# Patient Record
Sex: MALE | Race: WHITE | Employment: OTHER
[De-identification: names, ages, dates, MRNs, and addresses within clinical notes are randomized per-mention and may not be internally consistent; named-entity substitution may affect disease eponyms.]

---

## 2017-01-27 ENCOUNTER — OFFICE VISIT (OUTPATIENT)
Dept: FAMILY MEDICINE CLINIC | Facility: CLINIC | Age: 67
End: 2017-01-27

## 2017-01-27 VITALS
BODY MASS INDEX: 42.66 KG/M2 | HEIGHT: 70 IN | SYSTOLIC BLOOD PRESSURE: 144 MMHG | HEART RATE: 66 BPM | WEIGHT: 298 LBS | TEMPERATURE: 96.3 F | DIASTOLIC BLOOD PRESSURE: 88 MMHG | OXYGEN SATURATION: 97 %

## 2017-01-27 DIAGNOSIS — R05.9 COUGH: ICD-10-CM

## 2017-01-27 DIAGNOSIS — J01.01 ACUTE RECURRENT MAXILLARY SINUSITIS: Primary | ICD-10-CM

## 2017-01-27 PROCEDURE — 99213 OFFICE O/P EST LOW 20 MIN: CPT | Performed by: NURSE PRACTITIONER

## 2017-01-27 RX ORDER — AMOXICILLIN 875 MG/1
875 TABLET, COATED ORAL 2 TIMES DAILY
Qty: 20 TABLET | Refills: 0 | Status: SHIPPED | OUTPATIENT
Start: 2017-01-27 | End: 2017-02-06

## 2017-01-27 ASSESSMENT — ENCOUNTER SYMPTOMS
SHORTNESS OF BREATH: 1
SORE THROAT: 0
CHEST TIGHTNESS: 0
ABDOMINAL PAIN: 0
RHINORRHEA: 1
WHEEZING: 0
TROUBLE SWALLOWING: 0
SINUS PRESSURE: 1
COUGH: 1

## 2017-03-07 ENCOUNTER — TELEPHONE (OUTPATIENT)
Dept: FAMILY MEDICINE CLINIC | Facility: CLINIC | Age: 67
End: 2017-03-07

## 2017-03-07 RX ORDER — AMOXICILLIN AND CLAVULANATE POTASSIUM 875; 125 MG/1; MG/1
1 TABLET, FILM COATED ORAL 2 TIMES DAILY
Qty: 20 TABLET | Refills: 0 | Status: SHIPPED | OUTPATIENT
Start: 2017-03-07 | End: 2017-03-17

## 2017-03-15 RX ORDER — GLIMEPIRIDE 2 MG/1
2 TABLET ORAL EVERY MORNING
Qty: 30 TABLET | Refills: 3 | Status: SHIPPED | OUTPATIENT
Start: 2017-03-15 | End: 2017-04-20 | Stop reason: SDUPTHER

## 2017-03-27 RX ORDER — AMLODIPINE BESYLATE AND BENAZEPRIL HYDROCHLORIDE 5; 20 MG/1; MG/1
CAPSULE ORAL
Qty: 90 CAPSULE | Refills: 0 | Status: SHIPPED | OUTPATIENT
Start: 2017-03-27 | End: 2017-06-25 | Stop reason: SDUPTHER

## 2017-04-17 RX ORDER — SIMVASTATIN 10 MG
TABLET ORAL
Qty: 90 TABLET | Refills: 0 | Status: SHIPPED | OUTPATIENT
Start: 2017-04-17 | End: 2017-07-19 | Stop reason: SDUPTHER

## 2017-04-17 RX ORDER — ATENOLOL 50 MG/1
TABLET ORAL
Qty: 90 TABLET | Refills: 0 | Status: SHIPPED | OUTPATIENT
Start: 2017-04-17 | End: 2017-07-19 | Stop reason: SDUPTHER

## 2017-04-20 ENCOUNTER — TELEPHONE (OUTPATIENT)
Dept: FAMILY MEDICINE CLINIC | Age: 67
End: 2017-04-20

## 2017-04-20 RX ORDER — GLIMEPIRIDE 2 MG/1
2 TABLET ORAL EVERY MORNING
Qty: 30 TABLET | Refills: 3 | Status: SHIPPED | OUTPATIENT
Start: 2017-04-20 | End: 2017-04-24 | Stop reason: SDUPTHER

## 2017-04-24 RX ORDER — GLIMEPIRIDE 2 MG/1
2 TABLET ORAL EVERY MORNING
Qty: 90 TABLET | Refills: 3 | Status: SHIPPED | OUTPATIENT
Start: 2017-04-24 | End: 2017-07-06 | Stop reason: SDUPTHER

## 2017-06-26 RX ORDER — AMLODIPINE BESYLATE AND BENAZEPRIL HYDROCHLORIDE 5; 20 MG/1; MG/1
CAPSULE ORAL
Qty: 90 CAPSULE | Refills: 3 | Status: SHIPPED | OUTPATIENT
Start: 2017-06-26 | End: 2018-06-22 | Stop reason: SDUPTHER

## 2017-07-06 RX ORDER — GLIMEPIRIDE 2 MG/1
2 TABLET ORAL EVERY MORNING
Qty: 90 TABLET | Refills: 0 | Status: SHIPPED | OUTPATIENT
Start: 2017-07-06 | End: 2017-10-30 | Stop reason: SDUPTHER

## 2017-07-19 RX ORDER — SIMVASTATIN 10 MG
TABLET ORAL
Qty: 90 TABLET | Refills: 0 | Status: SHIPPED | OUTPATIENT
Start: 2017-07-19 | End: 2017-07-20 | Stop reason: SDUPTHER

## 2017-07-19 RX ORDER — ATENOLOL 50 MG/1
TABLET ORAL
Qty: 90 TABLET | Refills: 0 | Status: SHIPPED | OUTPATIENT
Start: 2017-07-19 | End: 2017-10-22 | Stop reason: SDUPTHER

## 2017-07-20 RX ORDER — SIMVASTATIN 10 MG
TABLET ORAL
Qty: 90 TABLET | Refills: 0 | Status: SHIPPED | OUTPATIENT
Start: 2017-07-20 | End: 2018-10-18 | Stop reason: SDUPTHER

## 2017-09-12 ENCOUNTER — OFFICE VISIT (OUTPATIENT)
Dept: FAMILY MEDICINE CLINIC | Age: 67
End: 2017-09-12
Payer: COMMERCIAL

## 2017-09-12 VITALS
WEIGHT: 292.8 LBS | SYSTOLIC BLOOD PRESSURE: 134 MMHG | TEMPERATURE: 96.7 F | BODY MASS INDEX: 41.92 KG/M2 | DIASTOLIC BLOOD PRESSURE: 82 MMHG | HEART RATE: 60 BPM | HEIGHT: 70 IN | OXYGEN SATURATION: 98 %

## 2017-09-12 DIAGNOSIS — Z00.00 ROUTINE GENERAL MEDICAL EXAMINATION AT A HEALTH CARE FACILITY: ICD-10-CM

## 2017-09-12 DIAGNOSIS — Z12.5 SPECIAL SCREENING FOR MALIGNANT NEOPLASM OF PROSTATE: ICD-10-CM

## 2017-09-12 DIAGNOSIS — E78.2 MIXED HYPERLIPIDEMIA: ICD-10-CM

## 2017-09-12 DIAGNOSIS — E11.9 TYPE 2 DIABETES MELLITUS WITHOUT COMPLICATION, WITHOUT LONG-TERM CURRENT USE OF INSULIN (HCC): Primary | ICD-10-CM

## 2017-09-12 DIAGNOSIS — Z23 NEEDS FLU SHOT: ICD-10-CM

## 2017-09-12 DIAGNOSIS — I10 ESSENTIAL HYPERTENSION: ICD-10-CM

## 2017-09-12 DIAGNOSIS — Z23 NEED FOR 23-POLYVALENT PNEUMOCOCCAL POLYSACCHARIDE VACCINE: ICD-10-CM

## 2017-09-12 LAB
CREATININE URINE POCT: 100
HBA1C MFR BLD: 6.4 %
MICROALBUMIN/CREAT 24H UR: 150 MG/G{CREAT}
MICROALBUMIN/CREAT UR-RTO: NORMAL

## 2017-09-12 PROCEDURE — 82044 UR ALBUMIN SEMIQUANTITATIVE: CPT | Performed by: NURSE PRACTITIONER

## 2017-09-12 PROCEDURE — 83036 HEMOGLOBIN GLYCOSYLATED A1C: CPT | Performed by: NURSE PRACTITIONER

## 2017-09-12 PROCEDURE — 90732 PPSV23 VACC 2 YRS+ SUBQ/IM: CPT | Performed by: NURSE PRACTITIONER

## 2017-09-12 PROCEDURE — 90471 IMMUNIZATION ADMIN: CPT | Performed by: NURSE PRACTITIONER

## 2017-09-12 PROCEDURE — 90662 IIV NO PRSV INCREASED AG IM: CPT | Performed by: NURSE PRACTITIONER

## 2017-09-12 PROCEDURE — 99214 OFFICE O/P EST MOD 30 MIN: CPT | Performed by: NURSE PRACTITIONER

## 2017-09-12 PROCEDURE — 90472 IMMUNIZATION ADMIN EACH ADD: CPT | Performed by: NURSE PRACTITIONER

## 2017-09-12 PROCEDURE — G8417 CALC BMI ABV UP PARAM F/U: HCPCS | Performed by: NURSE PRACTITIONER

## 2017-09-12 ASSESSMENT — ENCOUNTER SYMPTOMS
ABDOMINAL PAIN: 0
DIARRHEA: 0
NAUSEA: 0
SHORTNESS OF BREATH: 0
VOMITING: 0
COUGH: 0
CONSTIPATION: 0
BLOOD IN STOOL: 0
CHEST TIGHTNESS: 0
COLOR CHANGE: 0

## 2017-09-15 ENCOUNTER — HOSPITAL ENCOUNTER (OUTPATIENT)
Age: 67
Setting detail: SPECIMEN
Discharge: HOME | End: 2017-09-15
Payer: COMMERCIAL

## 2017-09-15 DIAGNOSIS — Z12.5 SPECIAL SCREENING FOR MALIGNANT NEOPLASM OF PROSTATE: ICD-10-CM

## 2017-09-15 DIAGNOSIS — E78.2 MIXED HYPERLIPIDEMIA: ICD-10-CM

## 2017-09-15 DIAGNOSIS — E11.9 TYPE 2 DIABETES MELLITUS WITHOUT COMPLICATION, WITHOUT LONG-TERM CURRENT USE OF INSULIN (HCC): ICD-10-CM

## 2017-09-15 DIAGNOSIS — Z00.00 ROUTINE GENERAL MEDICAL EXAMINATION AT A HEALTH CARE FACILITY: ICD-10-CM

## 2017-09-15 LAB
ABSOLUTE EOS #: 0.1 K/UL (ref 0–0.4)
ABSOLUTE LYMPH #: 2.1 K/UL (ref 1–4.8)
ABSOLUTE MONO #: 0.5 K/UL (ref 0.1–1.2)
ALBUMIN SERPL-MCNC: 4.3 G/DL (ref 3.5–5.2)
ALBUMIN/GLOBULIN RATIO: 1.5 (ref 1–2.5)
ALP BLD-CCNC: 61 U/L (ref 40–129)
ALT SERPL-CCNC: 30 U/L (ref 5–41)
ANION GAP SERPL CALCULATED.3IONS-SCNC: 12 MMOL/L (ref 9–17)
AST SERPL-CCNC: 30 U/L
BASOPHILS # BLD: 1 %
BASOPHILS ABSOLUTE: 0 K/UL (ref 0–0.2)
BILIRUB SERPL-MCNC: 1.11 MG/DL (ref 0.3–1.2)
BUN BLDV-MCNC: 13 MG/DL (ref 8–23)
BUN/CREAT BLD: ABNORMAL (ref 9–20)
CALCIUM SERPL-MCNC: 9.1 MG/DL (ref 8.6–10.4)
CHLORIDE BLD-SCNC: 100 MMOL/L (ref 98–107)
CHOLESTEROL/HDL RATIO: 4.5
CHOLESTEROL: 149 MG/DL
CO2: 27 MMOL/L (ref 20–31)
CREAT SERPL-MCNC: 0.78 MG/DL (ref 0.7–1.2)
DIFFERENTIAL TYPE: NORMAL
EOSINOPHILS RELATIVE PERCENT: 1 %
GFR AFRICAN AMERICAN: >60 ML/MIN
GFR NON-AFRICAN AMERICAN: >60 ML/MIN
GFR SERPL CREATININE-BSD FRML MDRD: ABNORMAL ML/MIN/{1.73_M2}
GFR SERPL CREATININE-BSD FRML MDRD: ABNORMAL ML/MIN/{1.73_M2}
GLUCOSE BLD-MCNC: 127 MG/DL (ref 70–99)
HCT VFR BLD CALC: 43.3 % (ref 41–53)
HDLC SERPL-MCNC: 33 MG/DL
HEMOGLOBIN: 14.9 G/DL (ref 13.5–17.5)
LDL CHOLESTEROL: 88 MG/DL (ref 0–130)
LYMPHOCYTES # BLD: 28 %
MCH RBC QN AUTO: 31.5 PG (ref 26–34)
MCHC RBC AUTO-ENTMCNC: 34.3 G/DL (ref 31–37)
MCV RBC AUTO: 91.8 FL (ref 80–100)
MONOCYTES # BLD: 7 %
PDW BLD-RTO: 14.2 % (ref 12.5–15.4)
PLATELET # BLD: 176 K/UL (ref 140–450)
PLATELET ESTIMATE: NORMAL
PMV BLD AUTO: 10.4 FL (ref 6–12)
POTASSIUM SERPL-SCNC: 4.3 MMOL/L (ref 3.7–5.3)
PROSTATE SPECIFIC ANTIGEN: 1.72 UG/L
RBC # BLD: 4.71 M/UL (ref 4.5–5.9)
RBC # BLD: NORMAL 10*6/UL
SEG NEUTROPHILS: 63 %
SEGMENTED NEUTROPHILS ABSOLUTE COUNT: 4.9 K/UL (ref 1.8–7.7)
SODIUM BLD-SCNC: 139 MMOL/L (ref 135–144)
TOTAL PROTEIN: 7.2 G/DL (ref 6.4–8.3)
TRIGL SERPL-MCNC: 142 MG/DL
VLDLC SERPL CALC-MCNC: ABNORMAL MG/DL (ref 1–30)
WBC # BLD: 7.7 K/UL (ref 3.5–11)
WBC # BLD: NORMAL 10*3/UL

## 2017-09-29 RX ORDER — MONTELUKAST SODIUM 4 MG/1
1 TABLET, CHEWABLE ORAL 2 TIMES DAILY
Qty: 180 TABLET | Refills: 3 | Status: SHIPPED | OUTPATIENT
Start: 2017-09-29 | End: 2018-12-27 | Stop reason: SDUPTHER

## 2017-10-30 RX ORDER — GLIMEPIRIDE 2 MG/1
2 TABLET ORAL EVERY MORNING
Qty: 90 TABLET | Refills: 0 | Status: SHIPPED | OUTPATIENT
Start: 2017-10-30 | End: 2018-01-28 | Stop reason: SDUPTHER

## 2018-01-03 ENCOUNTER — OFFICE VISIT (OUTPATIENT)
Dept: FAMILY MEDICINE CLINIC | Age: 68
End: 2018-01-03
Payer: COMMERCIAL

## 2018-01-03 VITALS
BODY MASS INDEX: 42.49 KG/M2 | TEMPERATURE: 96.2 F | HEART RATE: 68 BPM | SYSTOLIC BLOOD PRESSURE: 142 MMHG | HEIGHT: 70 IN | OXYGEN SATURATION: 97 % | DIASTOLIC BLOOD PRESSURE: 76 MMHG | WEIGHT: 296.8 LBS

## 2018-01-03 DIAGNOSIS — J01.81 OTHER ACUTE RECURRENT SINUSITIS: ICD-10-CM

## 2018-01-03 DIAGNOSIS — J20.9 ACUTE BRONCHITIS, UNSPECIFIED ORGANISM: Primary | ICD-10-CM

## 2018-01-03 PROCEDURE — 99213 OFFICE O/P EST LOW 20 MIN: CPT | Performed by: NURSE PRACTITIONER

## 2018-01-03 RX ORDER — ALBUTEROL SULFATE 90 UG/1
2 AEROSOL, METERED RESPIRATORY (INHALATION) EVERY 6 HOURS PRN
Qty: 1 INHALER | Refills: 3 | Status: SHIPPED | OUTPATIENT
Start: 2018-01-03 | End: 2018-10-18 | Stop reason: SDUPTHER

## 2018-01-03 RX ORDER — AZITHROMYCIN 250 MG/1
TABLET, FILM COATED ORAL
Qty: 1 PACKET | Refills: 0 | Status: SHIPPED | OUTPATIENT
Start: 2018-01-03 | End: 2018-01-13

## 2018-01-03 ASSESSMENT — ENCOUNTER SYMPTOMS
WHEEZING: 1
CHEST TIGHTNESS: 1
TROUBLE SWALLOWING: 0
SINUS PRESSURE: 1
SINUS PAIN: 1
ABDOMINAL PAIN: 0
SORE THROAT: 0
COUGH: 1
SHORTNESS OF BREATH: 0
RHINORRHEA: 1

## 2018-01-03 ASSESSMENT — PATIENT HEALTH QUESTIONNAIRE - PHQ9
SUM OF ALL RESPONSES TO PHQ QUESTIONS 1-9: 0
2. FEELING DOWN, DEPRESSED OR HOPELESS: 0
SUM OF ALL RESPONSES TO PHQ9 QUESTIONS 1 & 2: 0
1. LITTLE INTEREST OR PLEASURE IN DOING THINGS: 0

## 2018-01-09 ENCOUNTER — TELEPHONE (OUTPATIENT)
Dept: FAMILY MEDICINE CLINIC | Age: 68
End: 2018-01-09

## 2018-01-09 DIAGNOSIS — R05.9 COUGH: Primary | ICD-10-CM

## 2018-01-09 RX ORDER — GUAIFENESIN AND CODEINE PHOSPHATE 100; 10 MG/5ML; MG/5ML
5 SOLUTION ORAL 2 TIMES DAILY PRN
Qty: 240 ML | Refills: 0 | Status: SHIPPED | OUTPATIENT
Start: 2018-01-09 | End: 2018-01-16

## 2018-01-09 NOTE — TELEPHONE ENCOUNTER
You saw patient last wed and he still has a bad cough  So he was wondering if you would order something else that is a little stronger.   You gave him a z phillip previously

## 2018-01-09 NOTE — TELEPHONE ENCOUNTER
cxr ordered, zpack should have cleared up any bacterial infection, also new cough syrup called in with codeine, would like to hold on more abx until cxr done

## 2018-06-11 ENCOUNTER — OFFICE VISIT (OUTPATIENT)
Dept: FAMILY MEDICINE CLINIC | Age: 68
End: 2018-06-11
Payer: COMMERCIAL

## 2018-06-11 VITALS
HEIGHT: 70 IN | SYSTOLIC BLOOD PRESSURE: 138 MMHG | BODY MASS INDEX: 42.52 KG/M2 | DIASTOLIC BLOOD PRESSURE: 76 MMHG | RESPIRATION RATE: 17 BRPM | WEIGHT: 297 LBS | HEART RATE: 61 BPM | OXYGEN SATURATION: 94 % | TEMPERATURE: 97.4 F

## 2018-06-11 DIAGNOSIS — E11.9 TYPE 2 DIABETES MELLITUS WITHOUT COMPLICATION, WITHOUT LONG-TERM CURRENT USE OF INSULIN (HCC): Primary | ICD-10-CM

## 2018-06-11 DIAGNOSIS — Z12.11 SCREENING FOR COLON CANCER: ICD-10-CM

## 2018-06-11 DIAGNOSIS — M16.11 PRIMARY OSTEOARTHRITIS OF RIGHT HIP: ICD-10-CM

## 2018-06-11 DIAGNOSIS — S82.832S OTHER CLOSED FRACTURE OF PROXIMAL END OF LEFT FIBULA, SEQUELA: ICD-10-CM

## 2018-06-11 DIAGNOSIS — I10 ESSENTIAL HYPERTENSION: ICD-10-CM

## 2018-06-11 PROCEDURE — 99213 OFFICE O/P EST LOW 20 MIN: CPT | Performed by: INTERNAL MEDICINE

## 2018-06-19 ASSESSMENT — ENCOUNTER SYMPTOMS
NAUSEA: 0
DIARRHEA: 0
COUGH: 0
VOMITING: 0
BLOOD IN STOOL: 0
CHOKING: 0
CONSTIPATION: 0
ABDOMINAL PAIN: 0
CHEST TIGHTNESS: 0
SHORTNESS OF BREATH: 0
BLURRED VISION: 0
STRIDOR: 0
WHEEZING: 0
RECTAL PAIN: 0

## 2018-06-22 RX ORDER — AMLODIPINE BESYLATE AND BENAZEPRIL HYDROCHLORIDE 5; 20 MG/1; MG/1
CAPSULE ORAL
Qty: 90 CAPSULE | Refills: 3 | Status: SHIPPED | OUTPATIENT
Start: 2018-06-22 | End: 2018-06-27 | Stop reason: SDUPTHER

## 2018-06-27 RX ORDER — AMLODIPINE BESYLATE AND BENAZEPRIL HYDROCHLORIDE 5; 20 MG/1; MG/1
CAPSULE ORAL
Qty: 90 CAPSULE | Refills: 3 | Status: SHIPPED | OUTPATIENT
Start: 2018-06-27 | End: 2019-06-17

## 2018-07-19 RX ORDER — GLIMEPIRIDE 2 MG/1
TABLET ORAL
Qty: 90 TABLET | Refills: 1 | Status: SHIPPED | OUTPATIENT
Start: 2018-07-19 | End: 2018-09-28 | Stop reason: ALTCHOICE

## 2018-09-28 ENCOUNTER — OFFICE VISIT (OUTPATIENT)
Dept: FAMILY MEDICINE CLINIC | Age: 68
End: 2018-09-28
Payer: COMMERCIAL

## 2018-09-28 VITALS
HEIGHT: 70 IN | OXYGEN SATURATION: 98 % | BODY MASS INDEX: 41.66 KG/M2 | SYSTOLIC BLOOD PRESSURE: 134 MMHG | WEIGHT: 291 LBS | HEART RATE: 59 BPM | DIASTOLIC BLOOD PRESSURE: 77 MMHG | TEMPERATURE: 97.4 F

## 2018-09-28 DIAGNOSIS — Z00.00 ROUTINE GENERAL MEDICAL EXAMINATION AT A HEALTH CARE FACILITY: ICD-10-CM

## 2018-09-28 DIAGNOSIS — E11.9 TYPE 2 DIABETES MELLITUS WITHOUT COMPLICATION, WITHOUT LONG-TERM CURRENT USE OF INSULIN (HCC): Primary | ICD-10-CM

## 2018-09-28 DIAGNOSIS — Z12.5 SCREENING FOR PROSTATE CANCER: ICD-10-CM

## 2018-09-28 DIAGNOSIS — M79.10 MYALGIA: ICD-10-CM

## 2018-09-28 DIAGNOSIS — E78.2 MIXED HYPERLIPIDEMIA: ICD-10-CM

## 2018-09-28 DIAGNOSIS — Z23 FLU VACCINE NEED: ICD-10-CM

## 2018-09-28 LAB
CREATININE URINE POCT: 200
HBA1C MFR BLD: 6.1 %
MICROALBUMIN/CREAT 24H UR: 150 MG/G{CREAT}
MICROALBUMIN/CREAT UR-RTO: NORMAL

## 2018-09-28 PROCEDURE — 90662 IIV NO PRSV INCREASED AG IM: CPT | Performed by: NURSE PRACTITIONER

## 2018-09-28 PROCEDURE — 82044 UR ALBUMIN SEMIQUANTITATIVE: CPT | Performed by: NURSE PRACTITIONER

## 2018-09-28 PROCEDURE — 90471 IMMUNIZATION ADMIN: CPT | Performed by: NURSE PRACTITIONER

## 2018-09-28 PROCEDURE — 83036 HEMOGLOBIN GLYCOSYLATED A1C: CPT | Performed by: NURSE PRACTITIONER

## 2018-09-28 PROCEDURE — 99214 OFFICE O/P EST MOD 30 MIN: CPT | Performed by: NURSE PRACTITIONER

## 2018-09-28 ASSESSMENT — ENCOUNTER SYMPTOMS
NAUSEA: 0
DIARRHEA: 0
COUGH: 0
ABDOMINAL PAIN: 0
CONSTIPATION: 0
CHEST TIGHTNESS: 0
BLOOD IN STOOL: 0
BACK PAIN: 0
SHORTNESS OF BREATH: 0
VOMITING: 0

## 2018-09-28 NOTE — PROGRESS NOTES
[facility]  [address]  [place], [address]  [phone]      Dwayne Aden is a 76 y.o. male who presents today for his  medical conditions/complaints as noted below. Dwayne Aden is c/o of Diabetes and Muscle Pain (states he has on/off aches in muscles on various areas of body,not sure if he should be concerned)  . HPI:   Diabetes   He presents for his follow-up diabetic visit. He has type 2 diabetes mellitus. His disease course has been improving. There are no hypoglycemic associated symptoms. Pertinent negatives for hypoglycemia include no dizziness, headaches or nervousness/anxiousness. There are no diabetic associated symptoms. Pertinent negatives for diabetes include no chest pain, no fatigue, no polydipsia, no polyphagia, no polyuria and no weakness. There are no hypoglycemic complications. Diabetic complications include heart disease. Pertinent negatives for diabetic complications include no impotence, nephropathy or peripheral neuropathy. Risk factors for coronary artery disease include sedentary lifestyle, obesity, male sex, hypertension, dyslipidemia and diabetes mellitus. Current diabetic treatment includes diet and oral agent (dual therapy). He is compliant with treatment all of the time. His weight is stable. He is following a generally healthy and diabetic diet. Meal planning includes avoidance of concentrated sweets. He participates in exercise intermittently. His home blood glucose trend is decreasing steadily. An ACE inhibitor/angiotensin II receptor blocker is being taken. He does not see a podiatrist.Eye exam is current. Muscle Pain   This is a recurrent problem. The current episode started more than 1 month ago. The problem occurs intermittently. The problem has been waxing and waning since onset. Pain location: right ankle, right hip, left knee. The pain is mild. Nothing aggravates the symptoms.  Pertinent negatives include no abdominal pain, chest pain, constipation, diarrhea, dysuria, fatigue, fever, headaches, joint swelling, nausea, rash, sensory change, shortness of breath, vomiting or weakness. (Pain resolves itself after a few days  ) Past treatments include nothing. There is no swelling present. He has been behaving normally. Past Medical History:   Diagnosis Date    H/O cardiac catheterization 2003    Hyperlipidemia     on med    Hypertension     on med    Skin cancer     Type 2 diabetes mellitus ([address]       Past Surgical History:   Procedure Laterality Date    CARDIAC CATHETERIZATION  several    COLONOSCOPY      INGUINAL HERNIA REPAIR Right     PENIS SURGERY      SKIN CANCER EXCISION      TIBIA FRACTURE SURGERY Left     UPPER GASTROINTESTINAL ENDOSCOPY       Family History   Problem Relation Age of Onset    Kidney Cancer Father     COPD Mother     Other Mother         aneurysm     Social History   Substance Use Topics    Smoking status: Former Smoker     Packs/day: 1.00     Years: 20.00     Quit date: 1/23/1980    Smokeless tobacco: Never Used    Alcohol use No      Current Outpatient Prescriptions   Medication Sig Dispense Refill    metFORMIN (GLUCOPHAGE) 1000 MG tablet TAKE 1 TABLET TWICE A DAY WITH MEALS 180 tablet 1    amLODIPine-benazepril (LOTREL) 5-20 MG per capsule TAKE 1 CAPSULE DAILY 90 capsule 3    albuterol sulfate HFA (VENTOLIN HFA) 108 (90 Base) MCG/ACT inhaler Inhale 2 puffs into the lungs every 6 hours as needed for Wheezing 1 Inhaler 3    atenolol (TENORMIN) 50 MG tablet TAKE 1 TABLET DAILY 90 tablet 3    colestipol (COLESTID) 1 g tablet Take 1 tablet by mouth 2 times daily 180 tablet 3    simvastatin (ZOCOR) 10 MG tablet TAKE 1 TABLET NIGHTLY 90 tablet 0    aspirin (GILDARDO ASPIRIN) 325 MG tablet Take 1 tablet by mouth daily. 30 tablet 0    Omega-3 Fatty Acids (FISH OIL) 1200 MG CAPS Take 1,200 mg by mouth daily.  Garlic 4184 MG CAPS Take 1,000 mg by mouth daily.  Indications: stopped on Readings from Last 3 Encounters:   09/28/18 291 lb (132 kg)   06/11/18 297 lb (134.7 kg)   01/03/18 296 lb 12.8 oz (134.6 kg)        . Lab Results   Component Value Date    CHOL 149 09/15/2017    CHOL 168 10/12/2016    CHOL 171 03/21/2016     Lab Results   Component Value Date    TRIG 142 09/15/2017    TRIG 209 10/12/2016    TRIG 226 03/21/2016     Lab Results   Component Value Date    HDL 33 (L) 09/15/2017    HDL 34 (A) 10/12/2016    HDL 33 (A) 03/21/2016     Lab Results   Component Value Date    LDLCHOLESTEROL 88 09/15/2017    LDLCHOLESTEROL 94 04/08/2014    LDLCHOLESTEROL 81 06/04/2013    LDLCALC 92 10/12/2016    LDLCALC 93 03/21/2016    LDLCALC 77 01/14/2015     Lab Results   Component Value Date    VLDL NOT REPORTED 09/15/2017    VLDL 42 10/12/2016    VLDL 45 03/21/2016     Lab Results   Component Value Date    CHOLHDLRATIO 4.5 09/15/2017    CHOLHDLRATIO 4.9 10/12/2016    CHOLHDLRATIO 5.2 03/21/2016       Plan:      Return in about 6 months (around 3/28/2019) for return for diabetes management, return for bp check/HTN.   Orders Placed This Encounter   Procedures    INFLUENZA, HIGH DOSE, 65 YRS +, IM, PF, PREFILL SYR, 0.5ML (FLUZONE HD)    Lipid, Fasting     Standing Status:   Future     Standing Expiration Date:   9/28/2019    CBC     Standing Status:   Future     Standing Expiration Date:   9/28/2019    Comprehensive Metabolic Panel     Standing Status:   Future     Standing Expiration Date:   9/28/2019    PSA Screening     Standing Status:   Future     Standing Expiration Date:   9/28/2019    CK MB     Standing Status:   Future     Standing Expiration Date:   9/28/2019    Myoglobin, Serum     Standing Status:   Future     Standing Expiration Date:   9/28/2019    Sedimentation Rate     Standing Status:   Future     Standing Expiration Date:   9/28/2019    Vitamin B12     Standing Status:   Future     Standing Expiration Date:   9/28/2019    POCT glycosylated hemoglobin (Hb A1C)    POCT microalbumin Check muscle testing and update other labs  Will call with test results  DM:  Controlled  Stop amaryl  Continue metformin and diabetic diet  Regular exercise as able  He declines foot exam today    Muscle: Will await labs  May trial stopping statin and continue colestid only and see if symptoms resolve  Adeq. Water intake daily    Flu vaccine given  Pt declines AAA screen he stopped smoking in 1980's    Lipids:  Controlled>? Recheck labs  LF diet    Patient given educational materials - see patient instructions. Discussed use, benefit, and side effects of prescribed medications. All patient questions answered. Pt voiced understanding. Reviewed health maintenance. Instructed to continue current medications, diet and exercise.     Electronically signed by Rodrigo Salazar CNP on 9/28/2018 at 10:04 AM

## 2018-09-28 NOTE — PROGRESS NOTES
After obtaining consent, and per orders of Rachele Marx, injection of Flu given in Left deltoid by Bc Raymond. Patient instructed to remain in clinic for 20 minutes afterwards, and to report any adverse reaction to me immediately.

## 2018-09-28 NOTE — PROGRESS NOTES
Visit Information    Have you changed or started any medications since your last visit including any over-the-counter medicines, vitamins, or herbal medicines? no   Have you stopped taking any of your medications? Is so, why? -  no  Are you having any side effects from any of your medications? - no    Have you seen any other physician or provider since your last visit?  no   Have you had any other diagnostic tests since your last visit?  no   Have you been seen in the emergency room and/or had an admission in a hospital since we last saw you?  no   Have you had your routine dental cleaning in the past 6 months?  no     Do you have an active MyChart account? If no, what is the barrier?   No: declined    Patient Care Team:  Ling Monroe DO as PCP - General (Family Medicine)    Medical History Review  Past Medical, Family, and Social History reviewed and  contribute to the patient presenting condition    Health Maintenance   Topic Date Due    AAA screen  1950    Shingles Vaccine (1 of 2 - 2 Dose Series) 12/20/2016    Diabetic foot exam  10/20/2017    Flu vaccine (1) 09/01/2018    A1C test (Diabetic or Prediabetic)  09/12/2018    Diabetic microalbuminuria test  09/12/2018    Potassium monitoring  09/15/2018    Creatinine monitoring  09/15/2018    Lipid screen  09/15/2018    DTaP/Tdap/Td vaccine (1 - Tdap) 06/25/2020 (Originally 10/28/2015)    Hepatitis C screen  06/23/2021 (Originally 1950)    Colon Cancer Screen FIT/FOBT  10/31/2018    Diabetic retinal exam  11/07/2018    Pneumococcal low/med risk  Completed

## 2018-10-09 ENCOUNTER — HOSPITAL ENCOUNTER (OUTPATIENT)
Age: 68
Setting detail: SPECIMEN
Discharge: HOME | End: 2018-10-09
Payer: MEDICARE

## 2018-10-09 DIAGNOSIS — E11.9 TYPE 2 DIABETES MELLITUS WITHOUT COMPLICATION, WITHOUT LONG-TERM CURRENT USE OF INSULIN (HCC): ICD-10-CM

## 2018-10-09 DIAGNOSIS — Z00.00 ROUTINE GENERAL MEDICAL EXAMINATION AT A HEALTH CARE FACILITY: ICD-10-CM

## 2018-10-09 DIAGNOSIS — Z12.5 SCREENING FOR PROSTATE CANCER: ICD-10-CM

## 2018-10-09 DIAGNOSIS — M79.10 MYALGIA: ICD-10-CM

## 2018-10-09 DIAGNOSIS — E78.2 MIXED HYPERLIPIDEMIA: ICD-10-CM

## 2018-10-09 LAB
ALBUMIN SERPL-MCNC: 4.3 G/DL (ref 3.5–5.2)
ALBUMIN/GLOBULIN RATIO: 1.5 (ref 1–2.5)
ALP BLD-CCNC: 62 U/L (ref 40–129)
ALT SERPL-CCNC: 31 U/L (ref 5–41)
ANION GAP SERPL CALCULATED.3IONS-SCNC: 14 MMOL/L (ref 9–17)
AST SERPL-CCNC: 30 U/L
BILIRUB SERPL-MCNC: 1.3 MG/DL (ref 0.3–1.2)
BUN BLDV-MCNC: 15 MG/DL (ref 8–23)
BUN/CREAT BLD: ABNORMAL (ref 9–20)
CALCIUM SERPL-MCNC: 9 MG/DL (ref 8.6–10.4)
CHLORIDE BLD-SCNC: 99 MMOL/L (ref 98–107)
CHOLESTEROL, FASTING: 164 MG/DL
CHOLESTEROL/HDL RATIO: 5.1
CK MB: 6.5 NG/ML
CO2: 25 MMOL/L (ref 20–31)
CREAT SERPL-MCNC: 0.73 MG/DL (ref 0.7–1.2)
GFR AFRICAN AMERICAN: >60 ML/MIN
GFR NON-AFRICAN AMERICAN: >60 ML/MIN
GFR SERPL CREATININE-BSD FRML MDRD: ABNORMAL ML/MIN/{1.73_M2}
GFR SERPL CREATININE-BSD FRML MDRD: ABNORMAL ML/MIN/{1.73_M2}
GLUCOSE BLD-MCNC: 150 MG/DL (ref 70–99)
HCT VFR BLD CALC: 47 % (ref 40.7–50.3)
HDLC SERPL-MCNC: 32 MG/DL
HEMOGLOBIN: 15.5 G/DL (ref 13–17)
LDL CHOLESTEROL: 95 MG/DL (ref 0–130)
MCH RBC QN AUTO: 31.4 PG (ref 25.2–33.5)
MCHC RBC AUTO-ENTMCNC: 33 G/DL (ref 28.4–34.8)
MCV RBC AUTO: 95.1 FL (ref 82.6–102.9)
MYOGLOBIN: 61 NG/ML (ref 28–72)
NRBC AUTOMATED: 0 PER 100 WBC
PDW BLD-RTO: 13.4 % (ref 11.8–14.4)
PLATELET # BLD: 186 K/UL (ref 138–453)
PMV BLD AUTO: 12.3 FL (ref 8.1–13.5)
POTASSIUM SERPL-SCNC: 4 MMOL/L (ref 3.7–5.3)
PROSTATE SPECIFIC ANTIGEN: 1.45 UG/L
RBC # BLD: 4.94 M/UL (ref 4.21–5.77)
SEDIMENTATION RATE, ERYTHROCYTE: 1 MM (ref 0–10)
SODIUM BLD-SCNC: 138 MMOL/L (ref 135–144)
TOTAL PROTEIN: 7.1 G/DL (ref 6.4–8.3)
TRIGLYCERIDE, FASTING: 186 MG/DL
VITAMIN B-12: 743 PG/ML (ref 232–1245)
VLDLC SERPL CALC-MCNC: ABNORMAL MG/DL (ref 1–30)
WBC # BLD: 8.2 K/UL (ref 3.5–11.3)

## 2018-10-18 DIAGNOSIS — J20.9 ACUTE BRONCHITIS, UNSPECIFIED ORGANISM: ICD-10-CM

## 2018-10-18 RX ORDER — SIMVASTATIN 10 MG
TABLET ORAL
Qty: 90 TABLET | Refills: 0 | Status: SHIPPED | OUTPATIENT
Start: 2018-10-18 | End: 2018-12-13 | Stop reason: DRUGHIGH

## 2018-10-18 RX ORDER — ALBUTEROL SULFATE 90 UG/1
2 AEROSOL, METERED RESPIRATORY (INHALATION) EVERY 6 HOURS PRN
Qty: 1 INHALER | Refills: 3 | Status: SHIPPED | OUTPATIENT
Start: 2018-10-18 | End: 2020-09-17

## 2018-10-18 RX ORDER — ATENOLOL 50 MG/1
TABLET ORAL
Qty: 90 TABLET | Refills: 3 | Status: SHIPPED | OUTPATIENT
Start: 2018-10-18 | End: 2019-03-08

## 2018-11-16 LAB
AVERAGE GLUCOSE: NORMAL
HBA1C MFR BLD: 6.3 %

## 2018-12-07 ENCOUNTER — HOSPITAL ENCOUNTER (OUTPATIENT)
Age: 68
Setting detail: SPECIMEN
Discharge: HOME | End: 2018-12-07
Payer: MEDICARE

## 2018-12-07 ENCOUNTER — OFFICE VISIT (OUTPATIENT)
Dept: FAMILY MEDICINE CLINIC | Age: 68
End: 2018-12-07
Payer: MEDICARE

## 2018-12-07 VITALS
HEIGHT: 70 IN | WEIGHT: 292 LBS | BODY MASS INDEX: 41.8 KG/M2 | OXYGEN SATURATION: 96 % | HEART RATE: 61 BPM | SYSTOLIC BLOOD PRESSURE: 139 MMHG | DIASTOLIC BLOOD PRESSURE: 88 MMHG | TEMPERATURE: 97.3 F

## 2018-12-07 DIAGNOSIS — M25.561 ACUTE PAIN OF RIGHT KNEE: ICD-10-CM

## 2018-12-07 DIAGNOSIS — R35.0 URINARY FREQUENCY: ICD-10-CM

## 2018-12-07 DIAGNOSIS — I10 ESSENTIAL HYPERTENSION: ICD-10-CM

## 2018-12-07 DIAGNOSIS — M25.561 ACUTE PAIN OF RIGHT KNEE: Primary | ICD-10-CM

## 2018-12-07 LAB
-: NORMAL
ABSOLUTE EOS #: 0.12 K/UL (ref 0–0.44)
ABSOLUTE IMMATURE GRANULOCYTE: 0.03 K/UL (ref 0–0.3)
ABSOLUTE LYMPH #: 1.79 K/UL (ref 1.1–3.7)
ABSOLUTE MONO #: 0.57 K/UL (ref 0.1–1.2)
AMORPHOUS: NORMAL
BACTERIA: NORMAL
BASOPHILS # BLD: 1 % (ref 0–2)
BASOPHILS ABSOLUTE: 0.08 K/UL (ref 0–0.2)
BILIRUBIN URINE: ABNORMAL
C-REACTIVE PROTEIN: 1.7 MG/L (ref 0–5)
CASTS UA: NORMAL /LPF (ref 0–8)
COLOR: ABNORMAL
COMMENT UA: ABNORMAL
CREAT SERPL-MCNC: 0.71 MG/DL (ref 0.7–1.2)
CRYSTALS, UA: NORMAL /HPF
DIFFERENTIAL TYPE: ABNORMAL
EOSINOPHILS RELATIVE PERCENT: 1 % (ref 1–4)
EPITHELIAL CELLS UA: NORMAL /HPF (ref 0–5)
GFR AFRICAN AMERICAN: >60 ML/MIN
GFR NON-AFRICAN AMERICAN: >60 ML/MIN
GFR SERPL CREATININE-BSD FRML MDRD: NORMAL ML/MIN/{1.73_M2}
GFR SERPL CREATININE-BSD FRML MDRD: NORMAL ML/MIN/{1.73_M2}
GLUCOSE URINE: NEGATIVE
HCT VFR BLD CALC: 45.6 % (ref 40.7–50.3)
HEMOGLOBIN: 16.1 G/DL (ref 13–17)
IMMATURE GRANULOCYTES: 0 %
KETONES, URINE: ABNORMAL
LEUKOCYTE ESTERASE, URINE: NEGATIVE
LYMPHOCYTES # BLD: 21 % (ref 24–43)
MCH RBC QN AUTO: 32.7 PG (ref 25.2–33.5)
MCHC RBC AUTO-ENTMCNC: 35.3 G/DL (ref 28.4–34.8)
MCV RBC AUTO: 92.5 FL (ref 82.6–102.9)
MONOCYTES # BLD: 7 % (ref 3–12)
MUCUS: NORMAL
NITRITE, URINE: NEGATIVE
NRBC AUTOMATED: 0 PER 100 WBC
OTHER OBSERVATIONS UA: NORMAL
PDW BLD-RTO: 12.7 % (ref 11.8–14.4)
PH UA: 6.5 (ref 5–8)
PLATELET # BLD: 213 K/UL (ref 138–453)
PLATELET ESTIMATE: ABNORMAL
PMV BLD AUTO: 12.2 FL (ref 8.1–13.5)
PROTEIN UA: ABNORMAL
RBC # BLD: 4.93 M/UL (ref 4.21–5.77)
RBC # BLD: ABNORMAL 10*6/UL
RBC UA: NORMAL /HPF (ref 0–4)
RENAL EPITHELIAL, UA: NORMAL /HPF
SEDIMENTATION RATE, ERYTHROCYTE: 4 MM (ref 0–10)
SEG NEUTROPHILS: 70 % (ref 36–65)
SEGMENTED NEUTROPHILS ABSOLUTE COUNT: 6.08 K/UL (ref 1.5–8.1)
SPECIFIC GRAVITY UA: 1.02 (ref 1–1.03)
TRICHOMONAS: NORMAL
TURBIDITY: CLEAR
URIC ACID: 6.3 MG/DL (ref 3.4–7)
URINE HGB: NEGATIVE
UROBILINOGEN, URINE: NORMAL
VITAMIN D 25-HYDROXY: 33.6 NG/ML (ref 30–100)
WBC # BLD: 8.7 K/UL (ref 3.5–11.3)
WBC # BLD: ABNORMAL 10*3/UL
WBC UA: NORMAL /HPF (ref 0–5)
YEAST: NORMAL

## 2018-12-07 PROCEDURE — 99213 OFFICE O/P EST LOW 20 MIN: CPT | Performed by: NURSE PRACTITIONER

## 2018-12-07 RX ORDER — TRAMADOL HYDROCHLORIDE 50 MG/1
50 TABLET ORAL EVERY 6 HOURS PRN
Qty: 30 TABLET | Refills: 0 | Status: SHIPPED | OUTPATIENT
Start: 2018-12-07 | End: 2018-12-14

## 2018-12-07 ASSESSMENT — ENCOUNTER SYMPTOMS: SHORTNESS OF BREATH: 0

## 2018-12-10 ENCOUNTER — OFFICE VISIT (OUTPATIENT)
Dept: FAMILY MEDICINE CLINIC | Age: 68
End: 2018-12-10
Payer: MEDICARE

## 2018-12-10 VITALS
HEART RATE: 56 BPM | SYSTOLIC BLOOD PRESSURE: 140 MMHG | OXYGEN SATURATION: 97 % | BODY MASS INDEX: 41.8 KG/M2 | DIASTOLIC BLOOD PRESSURE: 80 MMHG | HEIGHT: 70 IN | TEMPERATURE: 96 F | WEIGHT: 292 LBS

## 2018-12-10 DIAGNOSIS — M54.50 LUMBAR PAIN WITH RADIATION DOWN LEFT LEG: Primary | ICD-10-CM

## 2018-12-10 DIAGNOSIS — M79.605 LUMBAR PAIN WITH RADIATION DOWN LEFT LEG: Primary | ICD-10-CM

## 2018-12-10 DIAGNOSIS — M79.605 LEFT LEG PAIN: ICD-10-CM

## 2018-12-10 PROCEDURE — 99213 OFFICE O/P EST LOW 20 MIN: CPT | Performed by: NURSE PRACTITIONER

## 2018-12-10 ASSESSMENT — ENCOUNTER SYMPTOMS
SHORTNESS OF BREATH: 0
BACK PAIN: 1
COLOR CHANGE: 0

## 2018-12-11 ENCOUNTER — NURSE ONLY (OUTPATIENT)
Dept: FAMILY MEDICINE CLINIC | Age: 68
End: 2018-12-11

## 2018-12-11 DIAGNOSIS — M54.50 LUMBAR PAIN WITH RADIATION DOWN LEFT LEG: Primary | ICD-10-CM

## 2018-12-11 DIAGNOSIS — M79.605 LUMBAR PAIN WITH RADIATION DOWN LEFT LEG: Primary | ICD-10-CM

## 2018-12-11 PROCEDURE — 99999 PR OFFICE/OUTPT VISIT,PROCEDURE ONLY: CPT | Performed by: NURSE PRACTITIONER

## 2018-12-13 ENCOUNTER — OFFICE VISIT (OUTPATIENT)
Dept: FAMILY MEDICINE CLINIC | Age: 68
End: 2018-12-13
Payer: MEDICARE

## 2018-12-13 VITALS
TEMPERATURE: 97.4 F | WEIGHT: 292 LBS | HEIGHT: 70 IN | HEART RATE: 63 BPM | BODY MASS INDEX: 41.8 KG/M2 | OXYGEN SATURATION: 97 % | DIASTOLIC BLOOD PRESSURE: 80 MMHG | SYSTOLIC BLOOD PRESSURE: 133 MMHG

## 2018-12-13 DIAGNOSIS — Z13.6 SCREENING FOR AAA (ABDOMINAL AORTIC ANEURYSM): ICD-10-CM

## 2018-12-13 DIAGNOSIS — E78.2 MIXED HYPERLIPIDEMIA: ICD-10-CM

## 2018-12-13 DIAGNOSIS — I70.8 ATHEROSCLEROSIS OF AORTIC BIFURCATION AND COMMON ILIAC ARTERIES (HCC): Primary | ICD-10-CM

## 2018-12-13 DIAGNOSIS — I70.0 ATHEROSCLEROSIS OF AORTIC BIFURCATION AND COMMON ILIAC ARTERIES (HCC): Primary | ICD-10-CM

## 2018-12-13 PROCEDURE — 99214 OFFICE O/P EST MOD 30 MIN: CPT | Performed by: NURSE PRACTITIONER

## 2018-12-13 RX ORDER — SIMVASTATIN 20 MG
20 TABLET ORAL EVERY EVENING
Qty: 90 TABLET | Refills: 1 | Status: SHIPPED | OUTPATIENT
Start: 2018-12-13 | End: 2019-05-27 | Stop reason: SDUPTHER

## 2018-12-13 RX ORDER — ASPIRIN 81 MG/1
81 TABLET ORAL DAILY
COMMUNITY

## 2018-12-13 ASSESSMENT — ENCOUNTER SYMPTOMS
BACK PAIN: 0
SHORTNESS OF BREATH: 0

## 2018-12-13 NOTE — PROGRESS NOTES
[facility]  [address]  [place], [address]  [phone]      Sunni De La Fuente is a 76 y.o. male who presents today for his  medicalconditions/complaints as noted below. Sunni De La Fuente is c/o of Back Pain (discuss xray results)  . HPI:   Pt here for review of recent lumbar spine xray. He has seen an improvement in pain in left thigh since last visit. He has not had to take any pain medications. He has been off zocor for past few days but unsure if related. He does not have any current back pain but this only happens once and awhile. Past Medical History:   Diagnosis Date    Atherosclerosis of aortic bifurcation and common iliac arteries ([facility] [address] 12/13/2018    H/O cardiac catheterization 2003    Hyperlipidemia     on med    Hypertension     on med    Skin cancer     Type 2 diabetes mellitus ([facility]       Past Surgical History:   Procedure Laterality Date    CARDIAC CATHETERIZATION  several    COLONOSCOPY      INGUINAL HERNIA REPAIR Right     PENIS SURGERY      SKIN CANCER EXCISION      TIBIA FRACTURE SURGERY Left     UPPER GASTROINTESTINAL ENDOSCOPY       Family History   Problem Relation Age of Onset    Kidney Cancer Father     COPD Mother     Other Mother         aneurysm     Social History   Substance Use Topics    Smoking status: Former Smoker     Packs/day: 1.00     Years: 20.00     Quit date: 1/23/1980    Smokeless tobacco: Never Used    Alcohol use No      Current Outpatient Prescriptions   Medication Sig Dispense Refill    aspirin 81 MG EC tablet Take 81 mg by mouth daily      simvastatin (ZOCOR) 20 MG tablet Take 1 tablet by mouth every evening 90 tablet 1    traMADol (ULTRAM) 50 MG tablet Take 1 tablet by mouth every 6 hours as needed for Pain for up to 7 days. . 30 tablet 0    atenolol (TENORMIN) 50 MG tablet TAKE 1 TABLET DAILY 90 tablet 3    albuterol sulfate HFA (VENTOLIN HFA) 108 (90 Base) MCG/ACT inhaler Inhale 2 puffs into the lungs every 6 hours as needed for Wheezing 1 Inhaler 3    metFORMIN (GLUCOPHAGE) 1000 MG tablet TAKE 1 TABLET TWICE A DAY WITH MEALS 180 tablet 1    amLODIPine-benazepril (LOTREL) 5-20 MG per capsule TAKE 1 CAPSULE DAILY 90 capsule 3    colestipol (COLESTID) 1 g tablet Take 1 tablet by mouth 2 times daily 180 tablet 3    aspirin (GILDARDO ASPIRIN) 325 MG tablet Take 1 tablet by mouth daily. 30 tablet 0    Omega-3 Fatty Acids (FISH OIL) 1200 MG CAPS Take 1,200 mg by mouth daily.  Garlic 2986 MG CAPS Take 1,000 mg by mouth daily. Indications: stopped on 1/21/14      glucose blood VI test strips (GILDARDO CONTOUR NEXT TEST) strip 1 each by In Vitro route 2 times daily DX: DM E11.9 100 each 3    GILDARDO MICROLET LANCETS MISC 1 applicator by Does not apply route 2 times daily DX: DM E11.9 60 each 5     No current facility-administered medications for this visit. No Known Allergies    Health Maintenance   Topic Date Due    Diabetic retinal exam  11/07/2018    DTaP/Tdap/Td vaccine (1 - Tdap) 06/25/2020 (Originally 10/28/2015)    AAA screen  09/09/2020 (Originally 1950)    Diabetic foot exam  09/23/2020 (Originally 10/20/2017)    Shingles Vaccine (1 of 2 - 2 Dose Series) 09/24/2020 (Originally 12/20/2016)    Hepatitis C screen  06/23/2021 (Originally 1950)    Diabetic microalbuminuria test  09/28/2019    Lipid screen  10/09/2019    Potassium monitoring  10/09/2019    A1C test (Diabetic or Prediabetic)  11/16/2019    Colon Cancer Screen FIT/FOBT  11/28/2019    Creatinine monitoring  12/07/2019    Flu vaccine  Completed    Pneumococcal low/med risk  Completed       Subjective:      Review of Systems   Constitutional: Negative for activity change, appetite change, fatigue and fever. Respiratory: Negative for shortness of breath. Cardiovascular: Negative for chest pain and leg swelling. Genitourinary: Negative for difficulty urinating. Musculoskeletal: Positive for arthralgias and myalgias.

## 2018-12-13 NOTE — PATIENT INSTRUCTIONS
example, call if:  · You passed out (lost consciousness). · You have severe pain in your belly, back, or chest.  · You have severe trouble breathing. · You have severe leg pain; numbness; or pale, blue-black skin. · You cough up blood. · You have symptoms of a heart attack. These may include:  ? Chest pain or pressure, or a strange feeling in the chest.  ? Sweating. ? Shortness of breath. ? Nausea or vomiting. ? Pain, pressure, or a strange feeling in the back, neck, jaw, or upper belly or in one or both shoulders or arms. ? Lightheadedness or sudden weakness. ? A fast or irregular heartbeat. · You have symptoms of a stroke. These may include:  ? Sudden numbness, tingling, weakness, or loss of movement in your face, arm, or leg, especially on only one side of your body. ? Sudden vision changes. ? Sudden trouble speaking. ? Sudden confusion or trouble understanding simple statements. ? Sudden problems with walking or balance. ? A sudden, severe headache that is different from past headaches. Watch closely for changes in your health, and be sure to contact your doctor if you have any problems. Follow-up care is a key part of your treatment and safety. Be sure to make and go to all appointments, and call your doctor if you are having problems. It's also a good idea to know your test results and keep a list of the medicines you take. Where can you learn more? Go to https://ComfypejoeBinary Fountain.Wigix. org and sign in to your ARCsys account. Enter E090 in the [facility] box to learn more about \"Learning About Atherosclerosis of the Aorta. \"     If you do not have an account, please click on the \"Sign Up Now\" link. Current as of: December 6, 2017  Content Version: 11.8  © 0189-2771 Healthwise, Incorporated. Care instructions adapted under license by [facility] ([facility]).  If you have questions about a medical condition or this instruction, always ask your healthcare professional. Shi Roberts, about stop-smoking programs and medicines. These can increase your chances of quitting for good. How is high cholesterol treated? The goal of treatment is to reduce your chances of having a heart attack or stroke. The goal is not to lower your cholesterol numbers only. · You may make lifestyle changes, such as eating healthy foods, not smoking, losing weight, and being more active. · You may have to take medicine. Follow-up care is a key part of your treatment and safety. Be sure to make and go to all appointments, and call your doctor if you are having problems. It's also a good idea to know your test results and keep a list of the medicines you take. Where can you learn more? Go to https://Shoutly.[employer]. org and sign in to your Foradian account. Enter B981 in the [employer] box to learn more about \"Learning About High Cholesterol. \"     If you do not have an account, please click on the \"Sign Up Now\" link. Current as of: December 6, 2017  Content Version: 11.8  © 8659-1608 [employer]. Care instructions adapted under license by [facility] ([facility]). If you have questions about a medical condition or this instruction, always ask your healthcare professional. [place] [address] any warranty or liability for your use of this information. Patient Education        Therapeutic Ball: Back Exercises  Your Care Instructions  Here are some examples of typical exercises for your condition. Start each exercise slowly. Ease off the exercise if you start to have pain. Your doctor or physical therapist will tell you when you can start these exercises and which ones will work best for you. To prepare, make sure that your ball is the right size for you. When inflated and firm, it should allow you to sit with your hips and knees bent at about a 90-degree angle (like the letter L). How to do the exercises  Seated position on ball    1.  Use this exercise to get used to floor until your shoulders, hips, and knees are all in a straight line. 6. While holding the bridge position, roll the ball toward you with your heels. Keep your hips as level as you can. 7. Pause briefly, and then roll the ball back out. Try to keep the ball rolling straight. You will feel the muscles in your lower belly working as you straighten your legs. 8. Lower your hips, and return to your starting position. 9. Repeat 8 to 12 times. 10. When you can keep your body and the ball steady throughout this exercise, you're ready for more challenge. Try keeping your hips raised while rolling the ball out, holding the bridge, and rolling back, a few times in a row. Praying ayleen    1. Kneel upright with the ball in front of you. 2. To start, clasp your hands together. Rest them on the ball in front of you. 3. As you do this exercise, keep your back and hips straight and tighten your belly and buttocks muscles. Keep your knees in place. 4. Press on the ball with your arms. Lean forward from the knees. This rolls the ball forward. You will bear most of your weight on your arms. 5. If your back starts to ache, you've gone too far. Pull back a bit. 6. Roll back to the start position. 7. Repeat 8 to 12 times. Walk-out plank on ball    1. Kneel over the ball. Place your hands on the floor in front of you. 2. Walk your hands forward until your legs are straight on the ball. This is the plank position. 3. When in plank position, hold your body straight and tighten your belly and buttocks muscles. Keep your chin slightly tucked. 4. Roll as far forward as you can without losing your balance or letting your hips drop. You may stop with the ball under your thighs, or even under your knees or shins. 5. Hold a few seconds, then walk your hands back and return to the start position. 6. Repeat 8 to 12 times. Push-up with thighs on ball    1. Kneel over the ball.  Place your hands on the floor in front of forearms. 15. Press your elbows down into the floor to raise your upper back. As you do this, relax your stomach muscles and allow your back to arch without using your back muscles. As your press up, do not let your hips or pelvis come off the floor. 16. Hold for 15 to 30 seconds, then relax. 17. Repeat 2 to 4 times. Alternate arm and leg (bird dog) exercise    11. Start on the floor, on your hands and knees. 12. Tighten your belly muscles. 13. Raise one leg off the floor, and hold it straight out behind you. Be careful not to let your hip drop down, because that will twist your trunk. 14. Hold for about 6 seconds, then lower your leg and switch to the other leg. 15. Repeat 8 to 12 times on each leg. 16. Over time, work up to holding for 10 to 30 seconds each time. 17. If you feel stable and secure with your leg raised, try raising the opposite arm straight out in front of you at the same time. Knee-to-chest exercise    6. Lie on your back with your knees bent and your feet flat on the floor. 7. Bring one knee to your chest, keeping the other foot flat on the floor (or keeping the other leg straight, whichever feels better on your lower back). 8. Keep your lower back pressed to the floor. Hold for at least 15 to 30 seconds. 9. Relax, and lower the knee to the starting position. 10. Repeat with the other leg. Repeat 2 to 4 times with each leg. 11. To get more stretch, put your other leg flat on the floor while pulling your knee to your chest.    Curl-ups    5. Lie on the floor on your back with your knees bent at a 90-degree angle. Your feet should be flat on the floor, about 12 inches from your buttocks. 6. Cross your arms over your chest. If this bothers your neck, try putting your hands behind your neck (not your head), with your elbows spread apart. 7. Slowly tighten your belly muscles and raise your shoulder blades off the floor.   8. Keep your head in line with your body, and do not press your chin to your chest.  9. Hold this position for 1 or 2 seconds, then slowly lower yourself back down to the floor. 10. Repeat 8 to 12 times. Pelvic tilt exercise    6. Lie on your back with your knees bent. 7. \"Brace\" your stomach. This means to tighten your muscles by pulling in and imagining your belly button moving toward your spine. You should feel like your back is pressing to the floor and your hips and pelvis are rocking back. 8. Hold for about 6 seconds while you breathe smoothly. 9. Repeat 8 to 12 times. Heel dig bridging    5. Lie on your back with both knees bent and your ankles bent so that only your heels are digging into the floor. Your knees should be bent about 90 degrees. 6. Then push your heels into the floor, squeeze your buttocks, and lift your hips off the floor until your shoulders, hips, and knees are all in a straight line. 7. Hold for about 6 seconds as you continue to breathe normally, and then slowly lower your hips back down to the floor and rest for up to 10 seconds. 8. Do 8 to 12 repetitions. Hamstring stretch in doorway    9. Lie on your back in a doorway, with one leg through the open door. 10. Slide your leg up the wall to straighten your knee. You should feel a gentle stretch down the back of your leg. 11. Hold the stretch for at least 15 to 30 seconds. Do not arch your back, point your toes, or bend either knee. Keep one heel touching the floor and the other heel touching the wall. 12. Repeat with your other leg. 13. Do 2 to 4 times for each leg. Hip flexor stretch    11. Kneel on the floor with one knee bent and one leg behind you. Place your forward knee over your foot. Keep your other knee touching the floor. 12. Slowly push your hips forward until you feel a stretch in the upper thigh of your rear leg. 13. Hold the stretch for at least 15 to 30 seconds. Repeat with your other leg. 14. Do 2 to 4 times on each side. Wall sit    8.  Stand with your

## 2018-12-27 RX ORDER — MONTELUKAST SODIUM 4 MG/1
1 TABLET, CHEWABLE ORAL 2 TIMES DAILY
Qty: 180 TABLET | Refills: 3 | Status: SHIPPED | OUTPATIENT
Start: 2018-12-27 | End: 2019-03-25 | Stop reason: ALTCHOICE

## 2019-01-10 ENCOUNTER — HOSPITAL ENCOUNTER (OUTPATIENT)
Dept: VASCULAR LAB | Age: 69
Discharge: HOME | End: 2019-01-10
Payer: MEDICARE

## 2019-01-10 DIAGNOSIS — Z13.6 SCREENING FOR AAA (ABDOMINAL AORTIC ANEURYSM): Primary | ICD-10-CM

## 2019-01-10 PROCEDURE — 76706 US ABDL AORTA SCREEN AAA: CPT

## 2019-02-07 ENCOUNTER — OFFICE VISIT (OUTPATIENT)
Dept: FAMILY MEDICINE CLINIC | Age: 69
End: 2019-02-07
Payer: MEDICARE

## 2019-02-07 VITALS
OXYGEN SATURATION: 96 % | HEART RATE: 66 BPM | DIASTOLIC BLOOD PRESSURE: 75 MMHG | WEIGHT: 290 LBS | HEIGHT: 70 IN | TEMPERATURE: 96.3 F | SYSTOLIC BLOOD PRESSURE: 135 MMHG | BODY MASS INDEX: 41.52 KG/M2

## 2019-02-07 DIAGNOSIS — M79.604 RIGHT LEG PAIN: Primary | ICD-10-CM

## 2019-02-07 DIAGNOSIS — E11.9 TYPE 2 DIABETES MELLITUS WITHOUT COMPLICATION, WITHOUT LONG-TERM CURRENT USE OF INSULIN (HCC): ICD-10-CM

## 2019-02-07 LAB — HBA1C MFR BLD: 6.6 %

## 2019-02-07 PROCEDURE — 99213 OFFICE O/P EST LOW 20 MIN: CPT | Performed by: NURSE PRACTITIONER

## 2019-02-07 PROCEDURE — 83036 HEMOGLOBIN GLYCOSYLATED A1C: CPT | Performed by: NURSE PRACTITIONER

## 2019-02-07 RX ORDER — HYDROCODONE BITARTRATE AND ACETAMINOPHEN 5; 325 MG/1; MG/1
1 TABLET ORAL EVERY 4 HOURS PRN
Qty: 30 TABLET | Refills: 0 | Status: SHIPPED | OUTPATIENT
Start: 2019-02-07 | End: 2019-02-12 | Stop reason: ALTCHOICE

## 2019-02-07 ASSESSMENT — PATIENT HEALTH QUESTIONNAIRE - PHQ9
SUM OF ALL RESPONSES TO PHQ QUESTIONS 1-9: 0
SUM OF ALL RESPONSES TO PHQ QUESTIONS 1-9: 0
SUM OF ALL RESPONSES TO PHQ9 QUESTIONS 1 & 2: 0
1. LITTLE INTEREST OR PLEASURE IN DOING THINGS: 0
2. FEELING DOWN, DEPRESSED OR HOPELESS: 0

## 2019-02-07 ASSESSMENT — ENCOUNTER SYMPTOMS
COLOR CHANGE: 0
VOMITING: 0
NAUSEA: 0
COUGH: 0
CHEST TIGHTNESS: 0
SHORTNESS OF BREATH: 0
ABDOMINAL PAIN: 0

## 2019-02-08 ENCOUNTER — TELEPHONE (OUTPATIENT)
Dept: FAMILY MEDICINE CLINIC | Age: 69
End: 2019-02-08

## 2019-02-08 DIAGNOSIS — M79.604 RIGHT LEG PAIN: Primary | ICD-10-CM

## 2019-02-12 RX ORDER — HYDROCODONE BITARTRATE AND ACETAMINOPHEN 5; 325 MG/1; MG/1
1 TABLET ORAL EVERY 8 HOURS PRN
Qty: 20 TABLET | Refills: 0 | Status: SHIPPED | OUTPATIENT
Start: 2019-02-12 | End: 2019-02-13 | Stop reason: SDUPTHER

## 2019-02-13 RX ORDER — HYDROCODONE BITARTRATE AND ACETAMINOPHEN 5; 325 MG/1; MG/1
1 TABLET ORAL EVERY 8 HOURS PRN
Qty: 20 TABLET | Refills: 0 | Status: SHIPPED | OUTPATIENT
Start: 2019-02-13 | End: 2019-02-20

## 2019-02-25 ENCOUNTER — OFFICE VISIT (OUTPATIENT)
Dept: FAMILY MEDICINE CLINIC | Age: 69
End: 2019-02-25
Payer: MEDICARE

## 2019-02-25 VITALS
TEMPERATURE: 96.5 F | DIASTOLIC BLOOD PRESSURE: 87 MMHG | HEIGHT: 70 IN | HEART RATE: 67 BPM | OXYGEN SATURATION: 98 % | BODY MASS INDEX: 41.23 KG/M2 | SYSTOLIC BLOOD PRESSURE: 139 MMHG | WEIGHT: 288 LBS

## 2019-02-25 DIAGNOSIS — R29.898 RIGHT LEG WEAKNESS: Primary | ICD-10-CM

## 2019-02-25 PROCEDURE — 99213 OFFICE O/P EST LOW 20 MIN: CPT | Performed by: NURSE PRACTITIONER

## 2019-02-25 ASSESSMENT — ENCOUNTER SYMPTOMS
COUGH: 0
ABDOMINAL PAIN: 0
SHORTNESS OF BREATH: 0
CHEST TIGHTNESS: 0
COLOR CHANGE: 0

## 2019-02-26 ASSESSMENT — ENCOUNTER SYMPTOMS: BACK PAIN: 0

## 2019-02-27 ENCOUNTER — TELEPHONE (OUTPATIENT)
Dept: FAMILY MEDICINE CLINIC | Age: 69
End: 2019-02-27

## 2019-02-27 DIAGNOSIS — R29.898 RIGHT LEG WEAKNESS: Primary | ICD-10-CM

## 2019-03-04 ENCOUNTER — HOSPITAL ENCOUNTER (EMERGENCY)
Age: 69
Discharge: HOME | End: 2019-03-04
Attending: EMERGENCY MEDICINE
Payer: MEDICARE

## 2019-03-04 ENCOUNTER — OFFICE VISIT (OUTPATIENT)
Dept: FAMILY MEDICINE CLINIC | Age: 69
End: 2019-03-04
Payer: MEDICARE

## 2019-03-04 ENCOUNTER — APPOINTMENT (OUTPATIENT)
Dept: GENERAL RADIOLOGY | Age: 69
End: 2019-03-04
Payer: MEDICARE

## 2019-03-04 VITALS
HEIGHT: 72 IN | WEIGHT: 286 LBS | DIASTOLIC BLOOD PRESSURE: 89 MMHG | RESPIRATION RATE: 18 BRPM | BODY MASS INDEX: 38.74 KG/M2 | HEART RATE: 72 BPM | OXYGEN SATURATION: 93 % | TEMPERATURE: 97.6 F | SYSTOLIC BLOOD PRESSURE: 161 MMHG

## 2019-03-04 VITALS
SYSTOLIC BLOOD PRESSURE: 196 MMHG | BODY MASS INDEX: 40.94 KG/M2 | HEART RATE: 71 BPM | OXYGEN SATURATION: 97 % | WEIGHT: 286 LBS | TEMPERATURE: 96.8 F | DIASTOLIC BLOOD PRESSURE: 100 MMHG | HEIGHT: 70 IN

## 2019-03-04 DIAGNOSIS — R00.2 PALPITATIONS: ICD-10-CM

## 2019-03-04 DIAGNOSIS — E11.9 TYPE 2 DIABETES MELLITUS WITHOUT COMPLICATION, WITHOUT LONG-TERM CURRENT USE OF INSULIN (HCC): Primary | ICD-10-CM

## 2019-03-04 DIAGNOSIS — I10 ESSENTIAL HYPERTENSION: ICD-10-CM

## 2019-03-04 DIAGNOSIS — R03.0 ELEVATED BLOOD PRESSURE READING: Primary | ICD-10-CM

## 2019-03-04 LAB
ABSOLUTE EOS #: 0.1 K/UL (ref 0–0.4)
ABSOLUTE IMMATURE GRANULOCYTE: NORMAL K/UL (ref 0–0.3)
ABSOLUTE LYMPH #: 2.7 K/UL (ref 1–4.8)
ABSOLUTE MONO #: 0.7 K/UL (ref 0.2–0.8)
ANION GAP SERPL CALCULATED.3IONS-SCNC: 13 MMOL/L (ref 9–17)
BASOPHILS # BLD: 0 % (ref 0–2)
BASOPHILS ABSOLUTE: 0 K/UL (ref 0–0.2)
BUN BLDV-MCNC: 14 MG/DL (ref 8–23)
BUN/CREAT BLD: 19 (ref 9–20)
CALCIUM SERPL-MCNC: 9.4 MG/DL (ref 8.6–10.4)
CHLORIDE BLD-SCNC: 102 MMOL/L (ref 98–107)
CO2: 25 MMOL/L (ref 20–31)
CREAT SERPL-MCNC: 0.75 MG/DL (ref 0.7–1.2)
DIFFERENTIAL TYPE: NORMAL
EOSINOPHILS RELATIVE PERCENT: 1 % (ref 1–4)
GFR AFRICAN AMERICAN: >60 ML/MIN
GFR NON-AFRICAN AMERICAN: >60 ML/MIN
GFR SERPL CREATININE-BSD FRML MDRD: ABNORMAL ML/MIN/{1.73_M2}
GFR SERPL CREATININE-BSD FRML MDRD: ABNORMAL ML/MIN/{1.73_M2}
GLUCOSE BLD-MCNC: 144 MG/DL (ref 70–99)
HBA1C MFR BLD: 6.2 %
HCT VFR BLD CALC: 44.9 % (ref 41–53)
HEMOGLOBIN: 15.7 G/DL (ref 13.5–17.5)
IMMATURE GRANULOCYTES: NORMAL %
LYMPHOCYTES # BLD: 28 % (ref 24–44)
MCH RBC QN AUTO: 32.1 PG (ref 26–34)
MCHC RBC AUTO-ENTMCNC: 34.9 G/DL (ref 31–37)
MCV RBC AUTO: 91.9 FL (ref 80–100)
MONOCYTES # BLD: 7 % (ref 1–7)
MYOGLOBIN: 108 NG/ML (ref 28–72)
NRBC AUTOMATED: NORMAL PER 100 WBC
PDW BLD-RTO: 13.2 % (ref 11.5–14.5)
PLATELET # BLD: 203 K/UL (ref 130–400)
PLATELET ESTIMATE: NORMAL
PMV BLD AUTO: 9.5 FL (ref 6–12)
POTASSIUM SERPL-SCNC: 4 MMOL/L (ref 3.7–5.3)
RBC # BLD: 4.89 M/UL (ref 4.5–5.9)
RBC # BLD: NORMAL 10*6/UL
SEG NEUTROPHILS: 64 % (ref 36–66)
SEGMENTED NEUTROPHILS ABSOLUTE COUNT: 6 K/UL (ref 1.8–7.7)
SODIUM BLD-SCNC: 140 MMOL/L (ref 135–144)
TROPONIN INTERP: ABNORMAL
TROPONIN T: ABNORMAL NG/ML
TROPONIN, HIGH SENSITIVITY: 19 NG/L (ref 0–22)
WBC # BLD: 9.5 K/UL (ref 3.5–11)
WBC # BLD: NORMAL 10*3/UL

## 2019-03-04 PROCEDURE — 6370000000 HC RX 637 (ALT 250 FOR IP): Performed by: NURSE PRACTITIONER

## 2019-03-04 PROCEDURE — 80048 BASIC METABOLIC PNL TOTAL CA: CPT

## 2019-03-04 PROCEDURE — 71046 X-RAY EXAM CHEST 2 VIEWS: CPT

## 2019-03-04 PROCEDURE — 99285 EMERGENCY DEPT VISIT HI MDM: CPT

## 2019-03-04 PROCEDURE — 93000 ELECTROCARDIOGRAM COMPLETE: CPT | Performed by: NURSE PRACTITIONER

## 2019-03-04 PROCEDURE — 85025 COMPLETE CBC W/AUTO DIFF WBC: CPT

## 2019-03-04 PROCEDURE — 99214 OFFICE O/P EST MOD 30 MIN: CPT | Performed by: NURSE PRACTITIONER

## 2019-03-04 PROCEDURE — 84484 ASSAY OF TROPONIN QUANT: CPT

## 2019-03-04 PROCEDURE — 83874 ASSAY OF MYOGLOBIN: CPT

## 2019-03-04 PROCEDURE — 93005 ELECTROCARDIOGRAM TRACING: CPT

## 2019-03-04 PROCEDURE — 83036 HEMOGLOBIN GLYCOSYLATED A1C: CPT | Performed by: NURSE PRACTITIONER

## 2019-03-04 RX ORDER — GLIMEPIRIDE 4 MG/1
2 TABLET ORAL
COMMUNITY
End: 2019-04-29 | Stop reason: SDUPTHER

## 2019-03-04 RX ORDER — ASPIRIN 81 MG/1
324 TABLET, CHEWABLE ORAL ONCE
Status: COMPLETED | OUTPATIENT
Start: 2019-03-04 | End: 2019-03-04

## 2019-03-04 RX ADMIN — ASPIRIN 81 MG 324 MG: 81 TABLET ORAL at 19:08

## 2019-03-04 ASSESSMENT — ENCOUNTER SYMPTOMS
PHOTOPHOBIA: 0
NAUSEA: 0
CHEST TIGHTNESS: 0
SHORTNESS OF BREATH: 0
VOMITING: 0
COLOR CHANGE: 1

## 2019-03-04 ASSESSMENT — PAIN SCALES - GENERAL: PAINLEVEL_OUTOF10: 0

## 2019-03-05 ENCOUNTER — TELEPHONE (OUTPATIENT)
Dept: FAMILY MEDICINE CLINIC | Age: 69
End: 2019-03-05

## 2019-03-06 ENCOUNTER — TELEPHONE (OUTPATIENT)
Dept: FAMILY MEDICINE CLINIC | Age: 69
End: 2019-03-06

## 2019-03-06 RX ORDER — HYDROCHLOROTHIAZIDE 12.5 MG/1
12.5 CAPSULE, GELATIN COATED ORAL DAILY
Qty: 30 CAPSULE | Refills: 3 | Status: SHIPPED | OUTPATIENT
Start: 2019-03-06 | End: 2019-03-31 | Stop reason: SDUPTHER

## 2019-03-08 ENCOUNTER — OFFICE VISIT (OUTPATIENT)
Dept: FAMILY MEDICINE CLINIC | Age: 69
End: 2019-03-08
Payer: MEDICARE

## 2019-03-08 VITALS
HEIGHT: 72 IN | DIASTOLIC BLOOD PRESSURE: 76 MMHG | OXYGEN SATURATION: 97 % | WEIGHT: 285 LBS | TEMPERATURE: 94.7 F | BODY MASS INDEX: 38.6 KG/M2 | SYSTOLIC BLOOD PRESSURE: 152 MMHG | HEART RATE: 63 BPM

## 2019-03-08 DIAGNOSIS — I10 ESSENTIAL HYPERTENSION: Primary | ICD-10-CM

## 2019-03-08 PROCEDURE — 99213 OFFICE O/P EST LOW 20 MIN: CPT | Performed by: NURSE PRACTITIONER

## 2019-03-08 RX ORDER — ATENOLOL 100 MG/1
100 TABLET ORAL DAILY
Qty: 30 TABLET | Refills: 3 | Status: SHIPPED | OUTPATIENT
Start: 2019-03-08 | End: 2019-03-24 | Stop reason: SDUPTHER

## 2019-03-08 ASSESSMENT — ENCOUNTER SYMPTOMS
VOMITING: 0
SHORTNESS OF BREATH: 0
COUGH: 0
NAUSEA: 0
ABDOMINAL PAIN: 0
CHEST TIGHTNESS: 0
DIARRHEA: 0

## 2019-03-10 LAB
EKG ATRIAL RATE: 73 BPM
EKG P AXIS: 57 DEGREES
EKG P-R INTERVAL: 162 MS
EKG Q-T INTERVAL: 414 MS
EKG QRS DURATION: 106 MS
EKG QTC CALCULATION (BAZETT): 456 MS
EKG R AXIS: 54 DEGREES
EKG T AXIS: 79 DEGREES
EKG VENTRICULAR RATE: 73 BPM

## 2019-03-16 ENCOUNTER — APPOINTMENT (OUTPATIENT)
Dept: CT IMAGING | Age: 69
End: 2019-03-16
Payer: MEDICARE

## 2019-03-16 ENCOUNTER — HOSPITAL ENCOUNTER (EMERGENCY)
Age: 69
Discharge: HOME | End: 2019-03-16
Attending: EMERGENCY MEDICINE
Payer: MEDICARE

## 2019-03-16 VITALS
DIASTOLIC BLOOD PRESSURE: 88 MMHG | SYSTOLIC BLOOD PRESSURE: 157 MMHG | OXYGEN SATURATION: 96 % | TEMPERATURE: 97.7 F | HEART RATE: 71 BPM | RESPIRATION RATE: 16 BRPM

## 2019-03-16 DIAGNOSIS — K63.89 EPIPLOIC APPENDAGITIS: Primary | ICD-10-CM

## 2019-03-16 LAB
-: ABNORMAL
ABSOLUTE EOS #: 0.2 K/UL (ref 0–0.4)
ABSOLUTE IMMATURE GRANULOCYTE: ABNORMAL K/UL (ref 0–0.3)
ABSOLUTE LYMPH #: 1.8 K/UL (ref 1–4.8)
ABSOLUTE MONO #: 0.5 K/UL (ref 0.2–0.8)
ALBUMIN SERPL-MCNC: 4.1 G/DL (ref 3.5–5.2)
ALBUMIN/GLOBULIN RATIO: NORMAL (ref 1–2.5)
ALP BLD-CCNC: 57 U/L (ref 40–129)
ALT SERPL-CCNC: 27 U/L (ref 5–41)
AMORPHOUS: ABNORMAL
ANION GAP SERPL CALCULATED.3IONS-SCNC: 13 MMOL/L (ref 9–17)
AST SERPL-CCNC: 23 U/L
BACTERIA: ABNORMAL
BASOPHILS # BLD: 0 % (ref 0–2)
BASOPHILS ABSOLUTE: 0 K/UL (ref 0–0.2)
BILIRUB SERPL-MCNC: 1.05 MG/DL (ref 0.3–1.2)
BILIRUBIN DIRECT: 0.26 MG/DL
BILIRUBIN URINE: NEGATIVE
BILIRUBIN, INDIRECT: 0.79 MG/DL (ref 0–1)
BUN BLDV-MCNC: 11 MG/DL (ref 8–23)
BUN/CREAT BLD: 15 (ref 9–20)
CALCIUM SERPL-MCNC: 9.2 MG/DL (ref 8.6–10.4)
CASTS UA: ABNORMAL /LPF
CHLORIDE BLD-SCNC: 100 MMOL/L (ref 98–107)
CO2: 25 MMOL/L (ref 20–31)
COLOR: YELLOW
COMMENT UA: ABNORMAL
CREAT SERPL-MCNC: 0.71 MG/DL (ref 0.7–1.2)
CRYSTALS, UA: ABNORMAL /HPF
DIFFERENTIAL TYPE: ABNORMAL
EOSINOPHILS RELATIVE PERCENT: 2 % (ref 1–4)
EPITHELIAL CELLS UA: ABNORMAL /HPF (ref 0–5)
GFR AFRICAN AMERICAN: >60 ML/MIN
GFR NON-AFRICAN AMERICAN: >60 ML/MIN
GFR SERPL CREATININE-BSD FRML MDRD: ABNORMAL ML/MIN/{1.73_M2}
GFR SERPL CREATININE-BSD FRML MDRD: ABNORMAL ML/MIN/{1.73_M2}
GLOBULIN: NORMAL G/DL (ref 1.5–3.8)
GLUCOSE BLD-MCNC: 205 MG/DL (ref 70–99)
GLUCOSE URINE: NEGATIVE
HCT VFR BLD CALC: 44.1 % (ref 41–53)
HEMOGLOBIN: 15.3 G/DL (ref 13.5–17.5)
IMMATURE GRANULOCYTES: ABNORMAL %
KETONES, URINE: NEGATIVE
LACTIC ACID: 2.1 MMOL/L (ref 0.5–2.2)
LEUKOCYTE ESTERASE, URINE: NEGATIVE
LYMPHOCYTES # BLD: 18 % (ref 24–44)
MCH RBC QN AUTO: 32 PG (ref 26–34)
MCHC RBC AUTO-ENTMCNC: 34.7 G/DL (ref 31–37)
MCV RBC AUTO: 92.1 FL (ref 80–100)
MONOCYTES # BLD: 5 % (ref 1–7)
MUCUS: ABNORMAL
NITRITE, URINE: NEGATIVE
NRBC AUTOMATED: ABNORMAL PER 100 WBC
OTHER OBSERVATIONS UA: ABNORMAL
PDW BLD-RTO: 13.2 % (ref 11.5–14.5)
PH UA: 6.5 (ref 5–8)
PLATELET # BLD: 212 K/UL (ref 130–400)
PLATELET ESTIMATE: ABNORMAL
PMV BLD AUTO: 9.8 FL (ref 6–12)
POTASSIUM SERPL-SCNC: 4.1 MMOL/L (ref 3.7–5.3)
PROTEIN UA: ABNORMAL
RBC # BLD: 4.78 M/UL (ref 4.5–5.9)
RBC # BLD: ABNORMAL 10*6/UL
RBC UA: ABNORMAL /HPF (ref 0–2)
RENAL EPITHELIAL, UA: ABNORMAL /HPF
SEG NEUTROPHILS: 75 % (ref 36–66)
SEGMENTED NEUTROPHILS ABSOLUTE COUNT: 7.4 K/UL (ref 1.8–7.7)
SODIUM BLD-SCNC: 138 MMOL/L (ref 135–144)
SPECIFIC GRAVITY UA: 1.01 (ref 1–1.03)
TOTAL PROTEIN: 7.1 G/DL (ref 6.4–8.3)
TRICHOMONAS: ABNORMAL
TURBIDITY: CLEAR
URINE HGB: NEGATIVE
UROBILINOGEN, URINE: NORMAL
WBC # BLD: 10 K/UL (ref 3.5–11)
WBC # BLD: ABNORMAL 10*3/UL
WBC UA: ABNORMAL /HPF (ref 0–5)
YEAST: ABNORMAL

## 2019-03-16 PROCEDURE — 74177 CT ABD & PELVIS W/CONTRAST: CPT

## 2019-03-16 PROCEDURE — 80048 BASIC METABOLIC PNL TOTAL CA: CPT

## 2019-03-16 PROCEDURE — 83605 ASSAY OF LACTIC ACID: CPT

## 2019-03-16 PROCEDURE — 87086 URINE CULTURE/COLONY COUNT: CPT

## 2019-03-16 PROCEDURE — 2580000003 HC RX 258: Performed by: EMERGENCY MEDICINE

## 2019-03-16 PROCEDURE — 81001 URINALYSIS AUTO W/SCOPE: CPT

## 2019-03-16 PROCEDURE — 85025 COMPLETE CBC W/AUTO DIFF WBC: CPT

## 2019-03-16 PROCEDURE — 80076 HEPATIC FUNCTION PANEL: CPT

## 2019-03-16 PROCEDURE — 99284 EMERGENCY DEPT VISIT MOD MDM: CPT

## 2019-03-16 PROCEDURE — 6360000004 HC RX CONTRAST MEDICATION: Performed by: EMERGENCY MEDICINE

## 2019-03-16 RX ORDER — 0.9 % SODIUM CHLORIDE 0.9 %
80 INTRAVENOUS SOLUTION INTRAVENOUS ONCE
Status: COMPLETED | OUTPATIENT
Start: 2019-03-16 | End: 2019-03-16

## 2019-03-16 RX ORDER — HYDROCODONE BITARTRATE AND ACETAMINOPHEN 5; 325 MG/1; MG/1
1 TABLET ORAL EVERY 6 HOURS PRN
COMMUNITY
End: 2021-01-14

## 2019-03-16 RX ORDER — 0.9 % SODIUM CHLORIDE 0.9 %
1000 INTRAVENOUS SOLUTION INTRAVENOUS ONCE
Status: COMPLETED | OUTPATIENT
Start: 2019-03-16 | End: 2019-03-16

## 2019-03-16 RX ORDER — SODIUM CHLORIDE 0.9 % (FLUSH) 0.9 %
10 SYRINGE (ML) INJECTION PRN
Status: DISCONTINUED | OUTPATIENT
Start: 2019-03-16 | End: 2019-03-17 | Stop reason: HOSPADM

## 2019-03-16 RX ADMIN — SODIUM CHLORIDE 80 ML: 9 INJECTION, SOLUTION INTRAVENOUS at 21:06

## 2019-03-16 RX ADMIN — SODIUM CHLORIDE 1000 ML: 9 INJECTION, SOLUTION INTRAVENOUS at 20:30

## 2019-03-16 RX ADMIN — IOPAMIDOL 75 ML: 755 INJECTION, SOLUTION INTRAVENOUS at 21:05

## 2019-03-16 RX ADMIN — Medication 10 ML: at 21:06

## 2019-03-16 ASSESSMENT — PAIN DESCRIPTION - LOCATION: LOCATION: ABDOMEN

## 2019-03-16 ASSESSMENT — PAIN SCALES - GENERAL: PAINLEVEL_OUTOF10: 10

## 2019-03-16 ASSESSMENT — PAIN DESCRIPTION - PAIN TYPE: TYPE: ACUTE PAIN

## 2019-03-16 ASSESSMENT — PAIN DESCRIPTION - ORIENTATION: ORIENTATION: LOWER;LEFT

## 2019-03-16 ASSESSMENT — PAIN DESCRIPTION - DESCRIPTORS: DESCRIPTORS: SORE;STABBING;SHARP

## 2019-03-18 LAB
CULTURE: NO GROWTH
Lab: NORMAL
SPECIMEN DESCRIPTION: NORMAL

## 2019-03-23 ENCOUNTER — PATIENT MESSAGE (OUTPATIENT)
Dept: FAMILY MEDICINE CLINIC | Age: 69
End: 2019-03-23

## 2019-03-24 RX ORDER — ATENOLOL 100 MG/1
100 TABLET ORAL DAILY
Qty: 90 TABLET | Refills: 1 | Status: SHIPPED | OUTPATIENT
Start: 2019-03-24 | End: 2019-09-05 | Stop reason: SDUPTHER

## 2019-03-25 ENCOUNTER — PATIENT MESSAGE (OUTPATIENT)
Dept: FAMILY MEDICINE CLINIC | Age: 69
End: 2019-03-25

## 2019-03-25 RX ORDER — COLESEVELAM 180 1/1
625 TABLET ORAL 2 TIMES DAILY WITH MEALS
Qty: 180 TABLET | Refills: 1 | Status: SHIPPED | OUTPATIENT
Start: 2019-03-25 | End: 2019-09-20 | Stop reason: SDUPTHER

## 2019-03-31 ENCOUNTER — PATIENT MESSAGE (OUTPATIENT)
Dept: FAMILY MEDICINE CLINIC | Age: 69
End: 2019-03-31

## 2019-03-31 RX ORDER — HYDROCHLOROTHIAZIDE 12.5 MG/1
12.5 CAPSULE, GELATIN COATED ORAL DAILY
Qty: 90 CAPSULE | Refills: 0 | Status: SHIPPED | OUTPATIENT
Start: 2019-03-31 | End: 2019-06-13 | Stop reason: SDUPTHER

## 2019-04-05 ENCOUNTER — PATIENT MESSAGE (OUTPATIENT)
Dept: FAMILY MEDICINE CLINIC | Age: 69
End: 2019-04-05

## 2019-04-05 NOTE — TELEPHONE ENCOUNTER
From: Mc Mayer  To: RILEY Pang - CNP  Sent: 4/5/2019 12:24 PM EDT  Subject: Non-Urgent Medical Question    Tomorrow is going to be my last water pill but my new prescription showed up today close call but we made it and my blood pressure this morning 135 / 73 good news. Thank you so much.

## 2019-04-29 ENCOUNTER — PATIENT MESSAGE (OUTPATIENT)
Dept: FAMILY MEDICINE CLINIC | Age: 69
End: 2019-04-29

## 2019-04-29 RX ORDER — GLIMEPIRIDE 4 MG/1
2 TABLET ORAL
Qty: 30 TABLET | Refills: 3 | Status: SHIPPED | OUTPATIENT
Start: 2019-04-29 | End: 2019-04-29 | Stop reason: SDUPTHER

## 2019-04-29 RX ORDER — GLIMEPIRIDE 4 MG/1
2 TABLET ORAL
Qty: 90 TABLET | Refills: 1 | Status: SHIPPED | OUTPATIENT
Start: 2019-04-29 | End: 2019-05-28 | Stop reason: SDUPTHER

## 2019-04-29 NOTE — TELEPHONE ENCOUNTER
From: Mic Block  To: RILEY Lara CNP  Sent: 4/29/2019 12:40 PM EDT  Subject: RE: Prescription Question    Not to [facility] I want it sent to [facility] please    ----- Message -----  From: RILEY Lara CNP  Sent: 4/29/19 12:31 PM  To:  Mic Block  Subject: RE: Prescription Question    Lambert Harris I [facility] over [facility]    ----- Message -----   From: Mic Block   Sent: 4/29/2019 7:59 AM EDT   To: RILEY Lara CNP  Subject: Prescription Question    I was using Glimepiride you want me to stop using it because my A1C was doing okay BUT since I stopped my blood test I've been running higher than it was before and I would like you to reinstate that please

## 2019-05-25 ENCOUNTER — PATIENT MESSAGE (OUTPATIENT)
Dept: FAMILY MEDICINE CLINIC | Age: 69
End: 2019-05-25

## 2019-05-28 RX ORDER — SIMVASTATIN 20 MG
TABLET ORAL
Qty: 90 TABLET | Refills: 1 | Status: SHIPPED | OUTPATIENT
Start: 2019-05-28 | End: 2019-11-24 | Stop reason: SDUPTHER

## 2019-05-28 RX ORDER — GLIMEPIRIDE 4 MG/1
4 TABLET ORAL
Qty: 90 TABLET | Refills: 1 | Status: SHIPPED | OUTPATIENT
Start: 2019-05-28 | End: 2019-11-17 | Stop reason: SDUPTHER

## 2019-05-28 NOTE — TELEPHONE ENCOUNTER
From: Laisha England  To: RILEY Lopez - CNP  Sent: 5/25/2019 11:28 AM EDT  Subject: Prescription Question    I'm sorry but I'm a little confused I was taking Glimepiride.  2mg  We increased it to 4mg but on the prescription bottle it says to take 1/2 of 4mg at breakfast isn't that the equivalent of a 2 mg that I was taking before?????

## 2019-06-17 RX ORDER — AMLODIPINE BESYLATE AND BENAZEPRIL HYDROCHLORIDE 5; 20 MG/1; MG/1
CAPSULE ORAL
Qty: 90 CAPSULE | Refills: 3 | Status: SHIPPED | OUTPATIENT
Start: 2019-06-17 | End: 2020-06-11

## 2019-09-05 RX ORDER — ATENOLOL 100 MG/1
TABLET ORAL
Qty: 90 TABLET | Refills: 4 | Status: SHIPPED | OUTPATIENT
Start: 2019-09-05 | End: 2020-11-20

## 2019-09-11 ENCOUNTER — OFFICE VISIT (OUTPATIENT)
Dept: FAMILY MEDICINE CLINIC | Age: 69
End: 2019-09-11
Payer: MEDICARE

## 2019-09-11 VITALS
OXYGEN SATURATION: 98 % | SYSTOLIC BLOOD PRESSURE: 135 MMHG | HEART RATE: 67 BPM | DIASTOLIC BLOOD PRESSURE: 84 MMHG | HEIGHT: 72 IN | BODY MASS INDEX: 39.14 KG/M2 | WEIGHT: 289 LBS | TEMPERATURE: 96.4 F

## 2019-09-11 DIAGNOSIS — E11.9 TYPE 2 DIABETES MELLITUS WITHOUT COMPLICATION, WITHOUT LONG-TERM CURRENT USE OF INSULIN (HCC): Primary | ICD-10-CM

## 2019-09-11 DIAGNOSIS — I10 ESSENTIAL HYPERTENSION: ICD-10-CM

## 2019-09-11 DIAGNOSIS — E66.01 CLASS 2 SEVERE OBESITY WITH SERIOUS COMORBIDITY AND BODY MASS INDEX (BMI) OF 39.0 TO 39.9 IN ADULT, UNSPECIFIED OBESITY TYPE (HCC): ICD-10-CM

## 2019-09-11 DIAGNOSIS — Z23 FLU VACCINE NEED: ICD-10-CM

## 2019-09-11 LAB — HBA1C MFR BLD: 5.9 %

## 2019-09-11 PROCEDURE — G0008 ADMIN INFLUENZA VIRUS VAC: HCPCS | Performed by: NURSE PRACTITIONER

## 2019-09-11 PROCEDURE — 99213 OFFICE O/P EST LOW 20 MIN: CPT | Performed by: NURSE PRACTITIONER

## 2019-09-11 PROCEDURE — G8417 CALC BMI ABV UP PARAM F/U: HCPCS | Performed by: NURSE PRACTITIONER

## 2019-09-11 PROCEDURE — 90653 IIV ADJUVANT VACCINE IM: CPT | Performed by: NURSE PRACTITIONER

## 2019-09-11 PROCEDURE — 83036 HEMOGLOBIN GLYCOSYLATED A1C: CPT | Performed by: NURSE PRACTITIONER

## 2019-09-11 ASSESSMENT — ENCOUNTER SYMPTOMS
ABDOMINAL PAIN: 0
NAUSEA: 0
CONSTIPATION: 0
VOMITING: 0
DIARRHEA: 0
SHORTNESS OF BREATH: 0
CHEST TIGHTNESS: 0
COUGH: 0

## 2019-09-11 NOTE — PROGRESS NOTES
[facility]  [address]  [place], [address]  [phone]      Della Agosto is a 71 y.o. male who presents today for his  medicalconditions/complaints as noted below. Della Agosto is c/o of Hypertension and Diabetes  . HPI:    Hypertension   This is a chronic problem. The current episode started more than 1 year ago. The problem has been gradually improving since onset. The problem is controlled. Pertinent negatives include no anxiety, chest pain, headaches, malaise/fatigue, palpitations, peripheral edema or shortness of breath. Risk factors for coronary artery disease include diabetes mellitus, male gender, obesity and sedentary lifestyle. Past treatments include beta blockers. There are no compliance problems. There is no history of CVA, heart failure or left ventricular hypertrophy. There is no history of a thyroid problem. Diabetes   He presents for his follow-up diabetic visit. He has type 2 diabetes mellitus. His disease course has been stable. Pertinent negatives for hypoglycemia include no dizziness or headaches. Pertinent negatives for diabetes include no chest pain, no fatigue, no polydipsia, no polyphagia, no polyuria, no weakness and no weight loss. There are no hypoglycemic complications. Symptoms are stable. There are no diabetic complications. Pertinent negatives for diabetic complications include no CVA. Risk factors for coronary artery disease include diabetes mellitus, obesity, male sex, sedentary lifestyle and hypertension. Current diabetic treatment includes oral agent (dual therapy). He is compliant with treatment all of the time. His weight is stable. He is following a diabetic and generally healthy diet. He participates in exercise intermittently. His home blood glucose trend is fluctuating minimally. An ACE inhibitor/angiotensin II receptor blocker is being taken.        Past Medical History:   Diagnosis Date    Atherosclerosis of aortic Procedures    INFLUENZA, TRIV, INACTIVATED, SUBUNIT, ADJUVANTED, 65 YRS AND OLDER, IM, PREFILL SYR, 0.5ML (FLUAD TRIV)    POCT glycosylated hemoglobin (Hb A1C)    VT CALC BMI ABV UP SHUN F/U         Patient given educational materials - see patient instructions. Discussed use,benefit, and side effects of prescribed medications. All patient questions answered. Pt voiced understanding. Reviewed health maintenance. Instructed to continue currentmedications, diet and exercise.     Electronically signed by Jimbo Salazar CNP on 9/11/2019 at 9:54 AM

## 2019-10-22 ENCOUNTER — OFFICE VISIT (OUTPATIENT)
Dept: FAMILY MEDICINE CLINIC | Age: 69
End: 2019-10-22
Payer: MEDICARE

## 2019-10-22 VITALS
HEART RATE: 63 BPM | BODY MASS INDEX: 39.55 KG/M2 | SYSTOLIC BLOOD PRESSURE: 127 MMHG | DIASTOLIC BLOOD PRESSURE: 73 MMHG | WEIGHT: 292 LBS | HEIGHT: 72 IN | OXYGEN SATURATION: 99 % | TEMPERATURE: 95.9 F

## 2019-10-22 DIAGNOSIS — R05.9 COUGH: Primary | ICD-10-CM

## 2019-10-22 DIAGNOSIS — E11.9 TYPE 2 DIABETES MELLITUS WITHOUT COMPLICATION, WITHOUT LONG-TERM CURRENT USE OF INSULIN (HCC): ICD-10-CM

## 2019-10-22 LAB
CREATININE URINE POCT: 300
MICROALBUMIN/CREAT 24H UR: 80 MG/G{CREAT}
MICROALBUMIN/CREAT UR-RTO: NORMAL

## 2019-10-22 PROCEDURE — 99213 OFFICE O/P EST LOW 20 MIN: CPT | Performed by: NURSE PRACTITIONER

## 2019-10-22 PROCEDURE — 82044 UR ALBUMIN SEMIQUANTITATIVE: CPT | Performed by: NURSE PRACTITIONER

## 2019-10-22 RX ORDER — METHYLPREDNISOLONE 4 MG/1
TABLET ORAL
Qty: 1 KIT | Refills: 0 | Status: SHIPPED | OUTPATIENT
Start: 2019-10-22 | End: 2019-10-28

## 2019-10-22 RX ORDER — BENZONATATE 200 MG/1
200 CAPSULE ORAL 3 TIMES DAILY PRN
Qty: 30 CAPSULE | Refills: 0 | Status: SHIPPED | OUTPATIENT
Start: 2019-10-22 | End: 2019-10-29

## 2019-10-22 ASSESSMENT — ENCOUNTER SYMPTOMS
WHEEZING: 0
TROUBLE SWALLOWING: 0
COUGH: 1
CHEST TIGHTNESS: 0
SORE THROAT: 0
RHINORRHEA: 0
ABDOMINAL PAIN: 0
SHORTNESS OF BREATH: 0
SINUS PRESSURE: 0

## 2019-10-26 ENCOUNTER — PATIENT MESSAGE (OUTPATIENT)
Dept: FAMILY MEDICINE CLINIC | Age: 69
End: 2019-10-26

## 2019-10-28 ENCOUNTER — PATIENT MESSAGE (OUTPATIENT)
Dept: FAMILY MEDICINE CLINIC | Age: 69
End: 2019-10-28

## 2019-10-28 RX ORDER — AMOXICILLIN AND CLAVULANATE POTASSIUM 875; 125 MG/1; MG/1
1 TABLET, FILM COATED ORAL 2 TIMES DAILY
Qty: 20 TABLET | Refills: 0 | Status: SHIPPED | OUTPATIENT
Start: 2019-10-28 | End: 2019-11-07

## 2019-11-04 ENCOUNTER — OFFICE VISIT (OUTPATIENT)
Dept: FAMILY MEDICINE CLINIC | Age: 69
End: 2019-11-04
Payer: MEDICARE

## 2019-11-04 VITALS
HEIGHT: 72 IN | DIASTOLIC BLOOD PRESSURE: 72 MMHG | TEMPERATURE: 95 F | BODY MASS INDEX: 39.42 KG/M2 | OXYGEN SATURATION: 96 % | WEIGHT: 291 LBS | HEART RATE: 61 BPM | SYSTOLIC BLOOD PRESSURE: 125 MMHG

## 2019-11-04 DIAGNOSIS — Z00.00 ROUTINE GENERAL MEDICAL EXAMINATION AT A HEALTH CARE FACILITY: ICD-10-CM

## 2019-11-04 DIAGNOSIS — Z12.11 SCREEN FOR COLON CANCER: ICD-10-CM

## 2019-11-04 DIAGNOSIS — Z00.00 MEDICARE ANNUAL WELLNESS VISIT, SUBSEQUENT: Primary | ICD-10-CM

## 2019-11-04 PROCEDURE — G0438 PPPS, INITIAL VISIT: HCPCS | Performed by: NURSE PRACTITIONER

## 2019-11-04 ASSESSMENT — PATIENT HEALTH QUESTIONNAIRE - PHQ9
SUM OF ALL RESPONSES TO PHQ QUESTIONS 1-9: 0
SUM OF ALL RESPONSES TO PHQ QUESTIONS 1-9: 0

## 2019-11-04 ASSESSMENT — LIFESTYLE VARIABLES: HOW OFTEN DO YOU HAVE A DRINK CONTAINING ALCOHOL: 0

## 2019-11-07 ENCOUNTER — TELEPHONE (OUTPATIENT)
Dept: FAMILY MEDICINE CLINIC | Age: 69
End: 2019-11-07

## 2019-11-07 RX ORDER — PREDNISONE 20 MG/1
20 TABLET ORAL 2 TIMES DAILY
Qty: 10 TABLET | Refills: 0 | Status: SHIPPED | OUTPATIENT
Start: 2019-11-07 | End: 2019-11-12

## 2019-11-16 DIAGNOSIS — Z12.11 SCREEN FOR COLON CANCER: ICD-10-CM

## 2020-04-23 ENCOUNTER — TELEMEDICINE (OUTPATIENT)
Dept: FAMILY MEDICINE CLINIC | Age: 70
End: 2020-04-23
Payer: MEDICARE

## 2020-04-23 VITALS
BODY MASS INDEX: 39.42 KG/M2 | HEIGHT: 72 IN | RESPIRATION RATE: 16 BRPM | WEIGHT: 291 LBS | DIASTOLIC BLOOD PRESSURE: 84 MMHG | SYSTOLIC BLOOD PRESSURE: 130 MMHG

## 2020-04-23 PROBLEM — C44.42 SQUAMOUS CELL CARCINOMA OF SCALP: Status: ACTIVE | Noted: 2020-04-23

## 2020-04-23 PROCEDURE — 99213 OFFICE O/P EST LOW 20 MIN: CPT | Performed by: NURSE PRACTITIONER

## 2020-04-23 ASSESSMENT — ENCOUNTER SYMPTOMS
SHORTNESS OF BREATH: 0
VOMITING: 0
ABDOMINAL PAIN: 0
CHEST TIGHTNESS: 0
COUGH: 0
NAUSEA: 0

## 2020-04-23 ASSESSMENT — PATIENT HEALTH QUESTIONNAIRE - PHQ9
SUM OF ALL RESPONSES TO PHQ9 QUESTIONS 1 & 2: 0
2. FEELING DOWN, DEPRESSED OR HOPELESS: 0
1. LITTLE INTEREST OR PLEASURE IN DOING THINGS: 0
SUM OF ALL RESPONSES TO PHQ QUESTIONS 1-9: 0
SUM OF ALL RESPONSES TO PHQ QUESTIONS 1-9: 0

## 2020-04-27 ENCOUNTER — PATIENT MESSAGE (OUTPATIENT)
Dept: FAMILY MEDICINE CLINIC | Age: 70
End: 2020-04-27

## 2020-04-28 ENCOUNTER — HOSPITAL ENCOUNTER (OUTPATIENT)
Age: 70
Setting detail: SPECIMEN
Discharge: HOME | End: 2020-04-28
Payer: MEDICARE

## 2020-04-28 LAB
ALBUMIN SERPL-MCNC: 4.2 G/DL (ref 3.5–5.2)
ALBUMIN/GLOBULIN RATIO: 1.4 (ref 1–2.5)
ALP BLD-CCNC: 50 U/L (ref 40–129)
ALT SERPL-CCNC: 20 U/L (ref 5–41)
ANION GAP SERPL CALCULATED.3IONS-SCNC: 15 MMOL/L (ref 9–17)
AST SERPL-CCNC: 23 U/L
BILIRUB SERPL-MCNC: 1.33 MG/DL (ref 0.3–1.2)
BUN BLDV-MCNC: 15 MG/DL (ref 8–23)
BUN/CREAT BLD: ABNORMAL (ref 9–20)
CALCIUM SERPL-MCNC: 9.1 MG/DL (ref 8.6–10.4)
CHLORIDE BLD-SCNC: 101 MMOL/L (ref 98–107)
CHOLESTEROL/HDL RATIO: 4.1
CHOLESTEROL: 134 MG/DL
CO2: 23 MMOL/L (ref 20–31)
CREAT SERPL-MCNC: 0.78 MG/DL (ref 0.7–1.2)
ESTIMATED AVERAGE GLUCOSE: 123 MG/DL
GFR AFRICAN AMERICAN: >60 ML/MIN
GFR NON-AFRICAN AMERICAN: >60 ML/MIN
GFR SERPL CREATININE-BSD FRML MDRD: ABNORMAL ML/MIN/{1.73_M2}
GFR SERPL CREATININE-BSD FRML MDRD: ABNORMAL ML/MIN/{1.73_M2}
GLUCOSE BLD-MCNC: 159 MG/DL (ref 70–99)
HBA1C MFR BLD: 5.9 % (ref 4–6)
HCT VFR BLD CALC: 46.9 % (ref 40.7–50.3)
HDLC SERPL-MCNC: 33 MG/DL
HEMOGLOBIN: 15.3 G/DL (ref 13–17)
LDL CHOLESTEROL: 72 MG/DL (ref 0–130)
MCH RBC QN AUTO: 30.9 PG (ref 25.2–33.5)
MCHC RBC AUTO-ENTMCNC: 32.6 G/DL (ref 28.4–34.8)
MCV RBC AUTO: 94.7 FL (ref 82.6–102.9)
NRBC AUTOMATED: 0 PER 100 WBC
PDW BLD-RTO: 13.4 % (ref 11.8–14.4)
PLATELET # BLD: 174 K/UL (ref 138–453)
PMV BLD AUTO: 12.1 FL (ref 8.1–13.5)
POTASSIUM SERPL-SCNC: 4.1 MMOL/L (ref 3.7–5.3)
PROSTATE SPECIFIC ANTIGEN: 1.43 UG/L
RBC # BLD: 4.95 M/UL (ref 4.21–5.77)
SODIUM BLD-SCNC: 139 MMOL/L (ref 135–144)
TOTAL PROTEIN: 7.1 G/DL (ref 6.4–8.3)
TRIGL SERPL-MCNC: 143 MG/DL
VLDLC SERPL CALC-MCNC: ABNORMAL MG/DL (ref 1–30)
WBC # BLD: 7.1 K/UL (ref 3.5–11.3)

## 2020-05-17 RX ORDER — GLIMEPIRIDE 4 MG/1
TABLET ORAL
Qty: 90 TABLET | Refills: 3 | Status: SHIPPED | OUTPATIENT
Start: 2020-05-17 | End: 2021-05-23

## 2020-05-22 RX ORDER — SIMVASTATIN 20 MG
TABLET ORAL
Qty: 90 TABLET | Refills: 3 | Status: SHIPPED | OUTPATIENT
Start: 2020-05-22 | End: 2021-05-17

## 2020-06-15 ENCOUNTER — PATIENT MESSAGE (OUTPATIENT)
Dept: FAMILY MEDICINE CLINIC | Age: 70
End: 2020-06-15

## 2020-06-15 RX ORDER — AMLODIPINE BESYLATE AND BENAZEPRIL HYDROCHLORIDE 5; 20 MG/1; MG/1
CAPSULE ORAL
Qty: 90 CAPSULE | Refills: 3 | Status: SHIPPED | OUTPATIENT
Start: 2020-06-15 | End: 2021-08-16 | Stop reason: SDUPTHER

## 2020-06-16 RX ORDER — COLESEVELAM 180 1/1
TABLET ORAL
Qty: 180 TABLET | Refills: 3 | Status: SHIPPED | OUTPATIENT
Start: 2020-06-16 | End: 2021-06-11

## 2020-06-29 ENCOUNTER — PATIENT MESSAGE (OUTPATIENT)
Dept: FAMILY MEDICINE CLINIC | Age: 70
End: 2020-06-29

## 2020-06-29 NOTE — TELEPHONE ENCOUNTER
From: Ava Hsu  To: Una Choi, APRN - CNP  Sent: 6/29/2020 1:40 PM EDT  Subject: Non-Urgent Medical Question    Just for the record I had my eyes re-examine plus new glasses done on 10/ 17/ 2019 across the street at the [address] [place] [address] [address] on [address] [place] [address] I thought they would have gotten hold you I guess I needed to request it next time I will do so.

## 2020-09-17 ENCOUNTER — NURSE ONLY (OUTPATIENT)
Dept: FAMILY MEDICINE CLINIC | Age: 70
End: 2020-09-17
Payer: MEDICARE

## 2020-09-17 ENCOUNTER — PATIENT MESSAGE (OUTPATIENT)
Dept: FAMILY MEDICINE CLINIC | Age: 70
End: 2020-09-17

## 2020-09-17 PROCEDURE — G0008 ADMIN INFLUENZA VIRUS VAC: HCPCS | Performed by: PHYSICIAN ASSISTANT

## 2020-09-17 PROCEDURE — 90694 VACC AIIV4 NO PRSRV 0.5ML IM: CPT | Performed by: PHYSICIAN ASSISTANT

## 2020-09-17 NOTE — PROGRESS NOTES
After obtaining consent, and per orders of Xu Fisher, injection of Flu given in Right deltoid by Franki Stephen. Patient instructed to remain in clinic for 20 minutes afterwards, and to report any adverse reaction to me immediately. Vaccine Information Sheet, \"Influenza - Inactivated\" given to Reji Arias, or parent/legal guardian of Reji Arias and verbalized understanding. Patient responses:  Have you ever had a reaction to a flu vaccine? No  Do you have any current illness? No  Have you ever had [place] Syndrome? No  Do you have a serious allergy to any of the following: Neomycin, Polymyxin, Thimerosal, eggs or egg products? No  Flu vaccine given per order. Please see immunization tab.

## 2020-09-23 ENCOUNTER — PATIENT MESSAGE (OUTPATIENT)
Dept: FAMILY MEDICINE CLINIC | Age: 70
End: 2020-09-23

## 2020-11-21 ENCOUNTER — OFFICE VISIT (OUTPATIENT)
Dept: FAMILY MEDICINE CLINIC | Age: 70
End: 2020-11-21
Payer: MEDICARE

## 2020-11-21 ENCOUNTER — HOSPITAL ENCOUNTER (OUTPATIENT)
Age: 70
Setting detail: SPECIMEN
Discharge: HOME | End: 2020-11-21
Payer: MEDICARE

## 2020-11-21 VITALS
TEMPERATURE: 97.5 F | BODY MASS INDEX: 39.28 KG/M2 | WEIGHT: 290 LBS | SYSTOLIC BLOOD PRESSURE: 176 MMHG | HEART RATE: 61 BPM | DIASTOLIC BLOOD PRESSURE: 90 MMHG | OXYGEN SATURATION: 95 % | HEIGHT: 72 IN

## 2020-11-21 LAB
BILIRUBIN, POC: NEGATIVE
BLOOD URINE, POC: NORMAL
CLARITY, POC: NORMAL
COLOR, POC: YELLOW
GLUCOSE URINE, POC: NEGATIVE
KETONES, POC: NEGATIVE
LEUKOCYTE EST, POC: NEGATIVE
NITRITE, POC: NEGATIVE
PH, POC: 7
PROTEIN, POC: 30
SPECIFIC GRAVITY, POC: 1.02
UROBILINOGEN, POC: 0.2

## 2020-11-21 PROCEDURE — 81002 URINALYSIS NONAUTO W/O SCOPE: CPT | Performed by: NURSE PRACTITIONER

## 2020-11-21 PROCEDURE — 99202 OFFICE O/P NEW SF 15 MIN: CPT | Performed by: NURSE PRACTITIONER

## 2020-11-21 RX ORDER — SULFAMETHOXAZOLE AND TRIMETHOPRIM 800; 160 MG/1; MG/1
1 TABLET ORAL 2 TIMES DAILY
Qty: 10 TABLET | Refills: 0 | Status: SHIPPED | OUTPATIENT
Start: 2020-11-21 | End: 2020-11-26

## 2020-11-21 NOTE — PROGRESS NOTES
hydrochlorothiazide (MICROZIDE) 12.5 MG capsule TAKE 1 CAPSULE DAILY 90 capsule 1    HYDROcodone-acetaminophen (NORCO) 5-325 MG per tablet Take 1 tablet by mouth every 6 hours as needed for Pain.  aspirin 81 MG EC tablet Take 81 mg by mouth daily      glucose blood VI test strips (GILDARDO CONTOUR NEXT TEST) strip 1 each by In Vitro route 2 times daily DX: DM E11.9 100 each 3    GILDARDO MICROLET LANCETS MISC 1 applicator by Does not apply route 2 times daily DX: DM E11.9 60 each 5    Omega-3 Fatty Acids (FISH OIL) 1200 MG CAPS Take 1,200 mg by mouth daily.  Garlic 2849 MG CAPS Take 1,000 mg by mouth daily. Indications: stopped on 1/21/14       No current facility-administered medications for this visit. No Known Allergies    Subjective:      Review of Systems   Constitutional: Negative for chills and fatigue. HENT: Negative for congestion, ear pain, sinus pain and sore throat. Eyes: Negative for pain and visual disturbance. Respiratory: Negative for cough and shortness of breath. Cardiovascular: Negative for chest pain and palpitations. Gastrointestinal: Positive for abdominal pain. Negative for diarrhea, nausea and vomiting. Genitourinary: Positive for frequency and hematuria. Negative for penile pain and testicular pain. Musculoskeletal: Negative for back pain, joint swelling and neck pain. Skin: Negative for rash. Neurological: Negative for dizziness and light-headedness. Hematological: Does not bruise/bleed easily. All other systems reviewed and are negative. Objective:     Physical Exam  Vitals signs and nursing note reviewed. Constitutional:       Appearance: Normal appearance. HENT:      Nose: Nose normal.      Mouth/Throat:      Mouth: Mucous membranes are moist.   Cardiovascular:      Rate and Rhythm: Normal rate. Pulmonary:      Effort: Pulmonary effort is normal. No respiratory distress. Breath sounds: Normal breath sounds.    Skin:     General: Skin is warm and dry. Neurological:      General: No focal deficit present. Mental Status: He is alert and oriented to person, place, and time. BP (!) 176/90 (Site: Left Upper Arm, Position: Sitting, Cuff Size: Medium Adult)   Pulse 61   Temp 97.5 °F (36.4 °C) (Tympanic)   Ht 6' (1.829 m)   Wt 290 lb (131.5 kg)   SpO2 95%   BMI 39.33 kg/m²   Lab Review   No visits with results within 2 Month(s) from this visit. Latest known visit with results is:   Hospital Outpatient Visit on 04/28/2020   Component Date Value    PSA 04/28/2020 1.43     Hemoglobin A1C 04/28/2020 5.9     Estimated Avg Glucose 04/28/2020 123     Glucose 04/28/2020 159*    BUN 04/28/2020 15     CREATININE 04/28/2020 0.78     Bun/Cre Ratio 04/28/2020 NOT REPORTED     Calcium 04/28/2020 9.1     Sodium 04/28/2020 139     Potassium 04/28/2020 4.1     Chloride 04/28/2020 101     CO2 04/28/2020 23     Anion Gap 04/28/2020 15     Alkaline Phosphatase 04/28/2020 50     ALT 04/28/2020 20     AST 04/28/2020 23     Total Bilirubin 04/28/2020 1.33*    Total Protein 04/28/2020 7.1     Alb 04/28/2020 4.2     Albumin/Globulin Ratio 04/28/2020 1.4     GFR Non- 04/28/2020 >60     GFR  04/28/2020 >60     GFR Comment 04/28/2020          GFR Staging 04/28/2020 NOT REPORTED     WBC 04/28/2020 7.1     RBC 04/28/2020 4.95     Hemoglobin 04/28/2020 15.3     Hematocrit 04/28/2020 46.9     MCV 04/28/2020 94.7     MCH 04/28/2020 30.9     MCHC 04/28/2020 32.6     RDW 04/28/2020 13.4     Platelets 14/85/1097 174     MPV 04/28/2020 12.1     NRBC Automated 04/28/2020 0.0     Cholesterol 04/28/2020 134     HDL 04/28/2020 33*    LDL Cholesterol 04/28/2020 72     Chol/HDL Ratio 04/28/2020 4.1     Triglycerides 04/28/2020 143     VLDL 04/28/2020 NOT REPORTED        Assessment:       Diagnosis Orders   1.  Frequent urination  POCT Urinalysis no Micro    sulfamethoxazole-trimethoprim (BACTRIM DS;SEPTRA DS) 800-160 MG per tablet    Culture, Urine    Julian Gunter MD, Urology, [facility]   2. Hematuria, unspecified type  sulfamethoxazole-trimethoprim (BACTRIM DS;SEPTRA DS) 800-160 MG per tablet    Culture, Urine    Julian Gunter MD, Urology, [facility]:      Return if symptoms worsen or fail to improve. Orders Placed This Encounter   Medications    sulfamethoxazole-trimethoprim (BACTRIM DS;SEPTRA DS) 800-160 MG per tablet     Sig: Take 1 tablet by mouth 2 times daily for 5 days     Dispense:  10 tablet     Refill:  0       Results for orders placed or performed in visit on 11/21/20   POCT Urinalysis no Micro   Result Value Ref Range    Color, UA yellow     Clarity, UA cloudy     Glucose, UA POC negative     Bilirubin, UA negative     Ketones, UA negative     Spec Grav, UA 1.025     Blood, UA POC moderate     pH, UA 7.0     Protein, UA POC 30     Urobilinogen, UA 0.2     Leukocytes, UA negative     Nitrite, UA negative      Patient instructed to complete entire antibiotic course. Will culture urine. Increase fluid intake. Educational materials regarding UTI given on AVS.  Patient agreeable to treatment plan. Follow up if symptoms do not improve/worsen. Referral for urology placed for follow-up       Patient given educational materials - see patient instructions. Discussed use, benefit, and side effects of prescribed medications. All patientquestions answered. Pt voiced understanding. This note was transcribed using dictation with Dragon services. Efforts were made to correct any errors but some words may be misinterpreted.      Electronically signed by RILEY Perez CNP on 11/22/2020at 7:52 AM

## 2020-11-21 NOTE — PATIENT INSTRUCTIONS
Patient Education        Blood in the Urine: Care Instructions  Your Care Instructions     Blood in the urine, or hematuria, may make the urine look red, brown, or pink. There may be blood every time you urinate or just from time to time. You cannot always see blood in the urine, but it will show up in a urine test.  Blood in the urine may be serious. It should always be checked by a doctor. Your doctor may recommend more tests, including an X-ray, a CT scan, or a cystoscopy (which lets a doctor look inside the urethra and bladder). Blood in the urine can be a sign of another problem. Common causes are bladder infections and kidney stones. An injury to your groin or your genital area can also cause bleeding in the urinary tract. Very hard exercise--such as running a marathon--can cause blood in the urine. Blood in the urine can also be a sign of kidney disease or cancer in the bladder or kidney. Many cases of blood in the urine are caused by a harmless condition that runs in families. This is called benign familial hematuria. It does not need any treatment. Sometimes your urine may look red or brown even though it does not contain blood. For example, not getting enough fluids (dehydration), taking certain medicines, or having a liver problem can change the color of your urine. Eating foods such as beets, rhubarb, or blackberries or foods with red food coloring can make your urine look red or pink. Follow-up care is a key part of your treatment and safety. Be sure to make and go to all appointments, and call your doctor if you are having problems. It's also a good idea to know your test results and keep a list of the medicines you take. When should you call for help? Call your doctor now or seek immediate medical care if:    · You have symptoms of a urinary infection. For example:  ? You have pus in your urine. ? You have pain in your back just below your rib cage. This is called flank pain. ?  You have a fever, chills, or body aches. ? It hurts to urinate. ? You have groin or belly pain.     · You have more blood in your urine. Watch closely for changes in your health, and be sure to contact your doctor if:    · You have new urination problems.     · You do not get better as expected. Where can you learn more? Go to [email or web address] and sign in to your [employer] account. Enter V439 in the SuperT[employer]per box to learn more about \"Blood in the Urine: Care Instructions. \"     If you do not have an account, please click on the \"Sign Up Now\" link. Current as of: June 29, 2020               Content Version: 12.6  © 9051-5769 [employer]. Care instructions adapted under license by [facility] ([facility]). If you have questions about a medical condition or this instruction, always ask your healthcare professional. [address] any warranty or liability for your use of this information.

## 2020-11-22 LAB
CULTURE: NO GROWTH
Lab: NORMAL
SPECIMEN DESCRIPTION: NORMAL

## 2020-11-22 ASSESSMENT — ENCOUNTER SYMPTOMS
ABDOMINAL PAIN: 1
NAUSEA: 0
COUGH: 0
EYE PAIN: 0
SORE THROAT: 0
VOMITING: 0
SINUS PAIN: 0
DIARRHEA: 0
SHORTNESS OF BREATH: 0
BACK PAIN: 0

## 2020-12-04 ENCOUNTER — PATIENT MESSAGE (OUTPATIENT)
Dept: FAMILY MEDICINE CLINIC | Age: 70
End: 2020-12-04

## 2020-12-04 RX ORDER — HYDROCHLOROTHIAZIDE 12.5 MG/1
CAPSULE, GELATIN COATED ORAL
Qty: 90 CAPSULE | Refills: 1 | Status: SHIPPED | OUTPATIENT
Start: 2020-12-04 | End: 2021-06-02

## 2020-12-04 NOTE — TELEPHONE ENCOUNTER
From: Judit Olivo  To: RILEY Borrero CNP  Sent: 12/4/2020 10:28 AM EST  Subject: Prescription Question    I would like to get a new prescription for   Hydrochorothiazde 12.5mg

## 2021-01-14 ENCOUNTER — OFFICE VISIT (OUTPATIENT)
Dept: FAMILY MEDICINE CLINIC | Age: 71
End: 2021-01-14
Payer: MEDICARE

## 2021-01-14 VITALS
BODY MASS INDEX: 39.31 KG/M2 | HEIGHT: 72 IN | WEIGHT: 290.2 LBS | OXYGEN SATURATION: 93 % | DIASTOLIC BLOOD PRESSURE: 62 MMHG | HEART RATE: 59 BPM | SYSTOLIC BLOOD PRESSURE: 135 MMHG | TEMPERATURE: 96.2 F

## 2021-01-14 DIAGNOSIS — E11.9 TYPE 2 DIABETES MELLITUS WITHOUT COMPLICATION, WITHOUT LONG-TERM CURRENT USE OF INSULIN (HCC): ICD-10-CM

## 2021-01-14 DIAGNOSIS — Z00.00 ROUTINE GENERAL MEDICAL EXAMINATION AT A HEALTH CARE FACILITY: ICD-10-CM

## 2021-01-14 DIAGNOSIS — Z00.00 MEDICARE ANNUAL WELLNESS VISIT, SUBSEQUENT: Primary | ICD-10-CM

## 2021-01-14 LAB
CREATININE URINE POCT: 100
HBA1C MFR BLD: 6 %
MICROALBUMIN/CREAT 24H UR: 30 MG/G{CREAT}
MICROALBUMIN/CREAT UR-RTO: <30

## 2021-01-14 PROCEDURE — G0439 PPPS, SUBSEQ VISIT: HCPCS | Performed by: NURSE PRACTITIONER

## 2021-01-14 PROCEDURE — 83036 HEMOGLOBIN GLYCOSYLATED A1C: CPT | Performed by: NURSE PRACTITIONER

## 2021-01-14 PROCEDURE — 82044 UR ALBUMIN SEMIQUANTITATIVE: CPT | Performed by: NURSE PRACTITIONER

## 2021-01-14 ASSESSMENT — LIFESTYLE VARIABLES: HOW OFTEN DO YOU HAVE A DRINK CONTAINING ALCOHOL: 0

## 2021-01-14 ASSESSMENT — PATIENT HEALTH QUESTIONNAIRE - PHQ9
2. FEELING DOWN, DEPRESSED OR HOPELESS: 0
1. LITTLE INTEREST OR PLEASURE IN DOING THINGS: 0
SUM OF ALL RESPONSES TO PHQ QUESTIONS 1-9: 0

## 2021-01-14 NOTE — PROGRESS NOTES
Visit Information    Have you changed or started any medications since your last visit including any over-the-counter medicines, vitamins, or herbal medicines? no   Have you stopped taking any of your medications? Is so, why? -  no  Are you having any side effects from any of your medications? - no    Have you seen any other physician or provider since your last visit?  no   Have you had any other diagnostic tests since your last visit?  no   Have you been seen in the emergency room and/or had an admission in a hospital since we last saw you?  no   Have you had your routine dental cleaning in the past 6 months?  no     Do you have an active MyChart account? If no, what is the barrier?   Yes    Patient Care Team:  RILEY Aguilar CNP as PCP - General (Family Nurse Practitioner)  RILEY Aguilar CNP as PCP - [facility] Provider    Medical History Review  Past Medical, Family, and Social History reviewed and  contribute to the patient presenting condition    Health Maintenance   Topic Date Due    DTaP/Tdap/Td vaccine (1 - Tdap) 03/15/1969    Shingles Vaccine (2 of 3) 12/15/2016    Diabetic foot exam  10/20/2017    Annual Wellness Visit (AWV)  05/29/2019    Diabetic retinal exam  10/07/2020    Diabetic microalbuminuria test  10/22/2020    Hepatitis C screen  06/23/2021 (Originally 1950)    A1C test (Diabetic or Prediabetic)  04/28/2021    Lipid screen  04/28/2021    Potassium monitoring  04/28/2021    Creatinine monitoring  04/28/2021    Colon cancer screen fecal DNA test (Cologuard)  11/15/2022    Flu vaccine  Completed    Pneumococcal 65+ years Vaccine  Completed    AAA screen  Completed    Hepatitis A vaccine  Aged Out    Hib vaccine  Aged Out    Meningococcal (ACWY) vaccine  Aged Out

## 2021-01-14 NOTE — PROGRESS NOTES
Medicare Annual Wellness Visit  Name: Molly Eason Date: 2021   MRN: [id number] Sex: Male   Age: 79 y.o. Ethnicity: Non-/Non    : 1950 Race: Linda Watkins is here for Diabetes and Medicare AWV    Screenings for behavioral, psychosocial and functional/safety risks, and cognitive dysfunction are all negative except as indicated below. These results, as well as other patient data from the [address] [facility] [place] [address] form, are documented in Flowsheets linked to this Encounter. No Known Allergies    Prior to Visit Medications    Medication Sig Taking? Authorizing Provider   metFORMIN (GLUCOPHAGE) 1000 MG tablet TAKE 1 TABLET TWICE A DAY WITH MEALS Yes RILEY Morrow CNP   hydroCHLOROthiazide (MICROZIDE) 12.5 MG capsule TAKE 1 CAPSULE DAILY Yes RILEY Morrow CNP   atenolol (TENORMIN) 100 MG tablet TAKE 1 TABLET DAILY Yes RILEY Morrow CNP   colesevelam (WELCHOL) 625 MG tablet TAKE 1 TABLET TWICE A DAY WITH MEALS Yes RILEY Morrow CNP   amLODIPine-benazepril (LOTREL) 5-20 MG per capsule Take 1 tab po qd Yes RILEY Morrow CNP   simvastatin (ZOCOR) 20 MG tablet TAKE 1 TABLET EVERY EVENING Yes RILEY Morrow CNP   glimepiride (AMARYL) 4 MG tablet TAKE 1 TABLET EVERY MORNING BEFORE BREAKFAST Yes RILEY Morrow CNP   aspirin 81 MG EC tablet Take 81 mg by mouth daily Yes Historical Provider, MD   Omega-3 Fatty Acids (FISH OIL) 1200 MG CAPS Take 1,200 mg by mouth daily.  Yes Historical Provider, MD   glucose blood VI test strips (GILDARDO CONTOUR NEXT TEST) strip 1 each by In Vitro route 2 times daily DX: DM E11.9  Destini Morocho MD   GILDARDO MICROLET LANCETS [address] 1 applicator by Does not apply route 2 times daily DX: DM E11.9  Destini Morocho MD       Past Medical History:   Diagnosis Date    Atherosclerosis of aortic bifurcation and common iliac arteries ([facility] [address] 2018    H/O cardiac catheterization 2003    Hyperlipidemia on med    Hypertension     on med    Skin cancer     Type 2 diabetes mellitus ([facility] [address]        Past Surgical History:   Procedure Laterality Date    CARDIAC CATHETERIZATION  several    COLONOSCOPY      INGUINAL HERNIA REPAIR Right     PENIS SURGERY      SKIN CANCER EXCISION      TIBIA FRACTURE SURGERY Left     UPPER GASTROINTESTINAL ENDOSCOPY         Family History   Problem Relation Age of Onset    Kidney Cancer Father     COPD Mother     Other Mother         aneurysm    No Known Problems Sister        CareTeam (Including outside providers/suppliers regularly involved in providing care):   Patient Care Team:  RILEY Mcfarlane CNP as PCP - General (Family Nurse Practitioner)  RI[facility]    Wt Readings from Last 3 Encounters:   01/14/21 290 lb 3.2 oz (131.6 kg)   11/21/20 290 lb (131.5 kg)   04/23/20 291 lb (132 kg)     Vitals:    01/14/21 0850   BP: 135/62   Site: Right Lower Arm   Position: Sitting   Cuff Size: Medium Adult   Pulse: 59   Temp: 96.2 °F (35.7 °C)   TempSrc: Tympanic   SpO2: 93%   Weight: 290 lb 3.2 oz (131.6 kg)   Height: 6' (1.829 m)     Body mass index is 39.36 kg/m². Based upon direct observation of the patient, evaluation of cognition reveals recent and remote memory intact.     General Appearance: alert and oriented to person, place and time, well-developed and well-nourished, in no acute distress  Pulmonary/Chest: clear to auscultation bilaterally- no wheezes, rales or rhonchi, normal air movement, no respiratory distress  Cardiovascular: normal rate, normal S1 and S2, no gallops, intact distal pulses and no carotid bruits  Extremities: no cyanosis, no clubbing, monofilament sensory exam abnormal- very limited sensation to bilateral feet, no charcot, deformity or callus or pressure sores noted,  and venous stasis dermatosis noted  Neurologic: no cranial nerve deficit, gait and coordination normal, speech normal, muscle strength normal and no tremor    Patient's complete Health Risk Assessment and screening values have been reviewed and are found in Flowsheets. The following problems were reviewed today and where indicated follow up appointments were made and/or referrals ordered. Positive Risk Factor Screenings with Interventions:          General Health and ACP:  General  In general, how would you say your health is?: Good  In the past 7 days, have you experienced any of the following?  New or Increased Pain, New or Increased Fatigue, Loneliness, Social Isolation, Stress or Anger?: None of These  Do you get the social and emotional support that you need?: Yes  Do you have a Living Will?: Yes  Advance Directives     Power of [address] Will ACP-Advance Directive ACP-Power of     Not on File Not on File Not on File Not on File      General Health Risk Interventions:  · No Living Will: Patient declines ACP discussion/assistance    Health Habits/Nutrition:  Health Habits/Nutrition  Do you exercise for at least 20 minutes 2-3 times per week?: Yes(walking on treadmill)  Have you lost any weight without trying in the past 3 months?: No  Do you eat fewer than 2 meals per day?: No  Have you seen a dentist within the past year?: (!) No  Body mass index: (!) 39.35  Health Habits/Nutrition Interventions:  · Nutritional issues:  educational materials for healthy, well-balanced diet provided, patient agrees to work toward a weight loss goal of 20 lbs pounds over the next 3 month(s) using the following plan: low carbohydrate diet, eliminate/reduce fat/sugar  · Dental exam overdue:  patient declines dental evaluation    Hearing/Vision:  No exam data present  Hearing/Vision  Do you or your family notice any trouble with your hearing?: (!) Yes(wife complains, has ordered hearing aids)  Do you have difficulty driving, watching TV, or doing any of your daily activities because of your eyesight?: No(trouble w/right eye at night,using Completed    Hepatitis A vaccine  Aged Out    Hib vaccine  Aged Out    Meningococcal (ACWY) vaccine  Aged Out     Recommendations for [employer] Due: see orders and patient instructions/AVS.  . Recommended screening schedule for the next 5-10 years is provided to the patient in written form: see Patient Octavio Manzo was seen today for diabetes and medicare awv.     Diagnoses and all orders for this visit:    Medicare annual wellness visit, subsequent    Type 2 diabetes mellitus without complication, without long-term current use of insulin ([facility] [address]  -     POCT glycosylated hemoglobin (Hb A1C)  -     POCT microalbumin    Routine general medical examination at a health care facility                 DM:  Well controlled  Results for orders placed or performed in visit on 01/14/21   POCT glycosylated hemoglobin (Hb A1C)   Result Value Ref Range    Hemoglobin A1C 6.0 %   POCT microalbumin   Result Value Ref Range    Microalb, Ur 30     Creatinine Ur POCT 100     Microalbumin Creatinine Ratio <30      Continue same meds for now and diabetic diet  Labs UTD  Will request eye exam    HTN controlled  Patient advised to follow heart healthy, low fat  diet and 150 minutes of cardiovascular exercise per week

## 2021-01-15 ENCOUNTER — TELEPHONE (OUTPATIENT)
Dept: FAMILY MEDICINE CLINIC | Age: 71
End: 2021-01-15

## 2021-02-18 RX ORDER — ATENOLOL 100 MG/1
TABLET ORAL
Qty: 90 TABLET | Refills: 3 | Status: SHIPPED | OUTPATIENT
Start: 2021-02-18 | End: 2022-01-31

## 2021-05-01 ENCOUNTER — HOSPITAL ENCOUNTER (OUTPATIENT)
Age: 71
Setting detail: SPECIMEN
Discharge: HOME | End: 2021-05-01
Payer: MEDICARE

## 2021-05-01 ENCOUNTER — OFFICE VISIT (OUTPATIENT)
Dept: PRIMARY CARE CLINIC | Age: 71
End: 2021-05-01
Payer: MEDICARE

## 2021-05-01 VITALS
OXYGEN SATURATION: 95 % | SYSTOLIC BLOOD PRESSURE: 138 MMHG | TEMPERATURE: 98.2 F | DIASTOLIC BLOOD PRESSURE: 73 MMHG | HEART RATE: 60 BPM

## 2021-05-01 DIAGNOSIS — R39.9 UTI SYMPTOMS: ICD-10-CM

## 2021-05-01 DIAGNOSIS — R31.9 HEMATURIA, UNSPECIFIED TYPE: Primary | ICD-10-CM

## 2021-05-01 LAB
BILIRUBIN, POC: ABNORMAL
BLOOD URINE, POC: ABNORMAL
CLARITY, POC: ABNORMAL
COLOR, POC: ABNORMAL
GLUCOSE URINE, POC: 250
KETONES, POC: ABNORMAL
LEUKOCYTE EST, POC: ABNORMAL
NITRITE, POC: ABNORMAL
PH, POC: 6
PROTEIN, POC: 30
SPECIFIC GRAVITY, POC: 1.02
UROBILINOGEN, POC: 1

## 2021-05-01 PROCEDURE — 81003 URINALYSIS AUTO W/O SCOPE: CPT | Performed by: NURSE PRACTITIONER

## 2021-05-01 PROCEDURE — 99214 OFFICE O/P EST MOD 30 MIN: CPT | Performed by: NURSE PRACTITIONER

## 2021-05-01 RX ORDER — SULFAMETHOXAZOLE AND TRIMETHOPRIM 800; 160 MG/1; MG/1
1 TABLET ORAL 2 TIMES DAILY
Qty: 14 TABLET | Refills: 0 | Status: SHIPPED | OUTPATIENT
Start: 2021-05-01 | End: 2021-05-08

## 2021-05-01 ASSESSMENT — ENCOUNTER SYMPTOMS
VOMITING: 0
NAUSEA: 0
FACIAL SWELLING: 0
ABDOMINAL PAIN: 0

## 2021-05-01 NOTE — PATIENT INSTRUCTIONS
Push water, keep hydrated  We will call you with the results of your urine culture    Patient Education        Urinary Tract Infections (UTI) in Men: Care Instructions  Overview     A urinary tract infection, or UTI, is a term for an infection anywhere between the kidneys and the urethra. (The urethra is the tube that carries urine from the bladder to outside the body.) Most UTIs are bladder infections. They often cause pain or burning when you urinate. UTIs are caused by bacteria. This means they can be cured with antibiotics. Be sure to complete your treatment so that the infection does not get worse. Follow-up care is a key part of your treatment and safety. Be sure to make and go to all appointments, and call your doctor if you are having problems. It's also a good idea to know your test results and keep a list of the medicines you take. How can you care for yourself at home? · Take your antibiotics as prescribed. Do not stop taking them just because you feel better. You need to take the full course of antibiotics. · Take your medicines exactly as prescribed. Your doctor may have prescribed a medicine, such as phenazopyridine (Pyridium), to help relieve pain when you urinate. This turns your urine orange. You may stop taking it when your symptoms get better. But be sure to take all of your antibiotics, which treat the infection. · Drink extra water for the next day or two. This will help make the urine less concentrated and help wash out the bacteria causing the infection. (If you have kidney, heart, or liver disease and have to limit your fluids, talk with your doctor before you increase your fluid intake.)  · Avoid drinks that are carbonated or have caffeine. They can irritate the bladder. · Urinate often. Try to empty your bladder each time. · To relieve pain, take a hot bath or lay a heating pad (set on low) over your lower belly or genital area. Never go to sleep with a heating pad in place.   To help prevent UTIs  · Drink plenty of fluids. If you have kidney, heart, or liver disease and have to limit fluids, talk with your doctor before you increase the amount of fluids you drink. · Urinate when you have the urge. Do not hold your urine for a long time. Urinate before you go to sleep. · Keep your penis clean. Catheter care  If you have a drainage tube (catheter) in place, the following steps will help you care for it. · Always wash your hands before and after touching your catheter. · Check the area around the urethra for inflammation or signs of infection. Signs of infection include irritated, swollen, red, or tender skin, or pus around the catheter. · Clean the area around the catheter with soap and water two times a day. Dry with a clean towel afterward. · Do not apply powder or lotion to the skin around the catheter. To empty the urine collection bag   · Wash your hands with soap and water. · Without touching the drain spout, remove the spout from its sleeve at the bottom of the collection bag. Open the valve on the spout. · Let the urine flow out of the bag and into the toilet or a container. Do not let the tubing or drain spout touch anything. · After you empty the bag, clean the end of the drain spout with tissue and water. Close the valve and put the drain spout back into its sleeve at the bottom of the collection bag. · Wash your hands with soap and water. When should you call for help? Call your doctor now or seek immediate medical care if:    · Symptoms such as a fever, chills, nausea, or vomiting get worse or happen for the first time.     · You have new pain in your back just below your rib cage. This is called flank pain.     · There is new blood or pus in your urine.     · You are not able to take or keep down your antibiotics.    Watch closely for changes in your health, and be sure to contact your doctor if:    · You are not getting better after taking an antibiotic for 2 days.     · Your symptoms go away but then come back. Where can you learn more? Go to [email or web address] and sign in to your [employer] account. Enter A090 in the [facility] box to learn more about \"Urinary Tract Infections (UTI) in Men: Care Instructions. \"     If you do not have an account, please click on the \"Sign Up Now\" link. Current as of: June 29, 2020               Content Version: 12.8  © 2006-2021 Healthwise, Incorporated. Care instructions adapted under license by [facility] ([facility]). If you have questions about a medical condition or this instruction, always ask your healthcare professional. [address] any warranty or liability for your use of this information.

## 2021-05-01 NOTE — PROGRESS NOTES
[facility] [address] [facility] [place] [facility]  [address] [facility]  [place] [address]  Dept: [phone]  Dept Fax: [phone]    Octavio Jorge is a 70 y.o. male who presents to the urgent care today for his medical conditions/complaints as notedbelow. Octavio Jorge is c/o of Hematuria (started yesterday)      HPI:     70 yr old male presents for blood in urine. Started yesterday. Increased frequency, urgency. Feels well. No fevers, n/v or back pain. Does have a history of kidney stones but states has not had any issues with back pain groin pain or any pain at all. Same symptoms in November of last year. Prescribed Bactrim and symptoms cleared. Urine has varied from  red to light pink. Patient has been increasing water intake. Denies any clots or difficulty with urinating or hesitancy. Quit smoking 1980's  Not on nsaids or blood thinners    Hematuria  This is a recurrent (11/2020 same sx) problem. The current episode started yesterday. The problem has been waxing and waning since onset. He describes the hematuria as gross hematuria. He reports no clotting in his urine stream. His pain is at a severity of 0/10. He is experiencing no pain. He describes his urine color as tea colored. Irritative symptoms include frequency and urgency. Obstructive symptoms do not include dribbling, incomplete emptying, an intermittent stream, a slower stream, straining or a weak stream. Pertinent negatives include no abdominal pain, chills, dysuria, facial swelling, fever, flank pain, genital pain, hesitancy, inability to urinate, nausea or vomiting. His past medical history is significant for kidney stones.        Past Medical History:   Diagnosis Date    Atherosclerosis of aortic bifurcation and common iliac arteries ([address] 12/13/2018    H/O cardiac catheterization 2003    Hyperlipidemia     on med    Hypertension     on med    Skin cancer     Type 2 diabetes mellitus ([address]         Current Outpatient Medications   Medication Sig Dispense Refill    sulfamethoxazole-trimethoprim (BACTRIM DS;SEPTRA DS) 800-160 MG per tablet Take 1 tablet by mouth 2 times daily for 7 days 14 tablet 0    metFORMIN (GLUCOPHAGE) 1000 MG tablet TAKE 1 TABLET TWICE A DAY WITH MEALS 180 tablet 3    atenolol (TENORMIN) 100 MG tablet TAKE 1 TABLET DAILY 90 tablet 3    hydroCHLOROthiazide (MICROZIDE) 12.5 MG capsule TAKE 1 CAPSULE DAILY 90 capsule 1    colesevelam (WELCHOL) 625 MG tablet TAKE 1 TABLET TWICE A DAY WITH MEALS 180 tablet 3    amLODIPine-benazepril (LOTREL) 5-20 MG per capsule Take 1 tab po qd 90 capsule 3    simvastatin (ZOCOR) 20 MG tablet TAKE 1 TABLET EVERY EVENING 90 tablet 3    glimepiride (AMARYL) 4 MG tablet TAKE 1 TABLET EVERY MORNING BEFORE BREAKFAST 90 tablet 3    aspirin 81 MG EC tablet Take 81 mg by mouth daily      Omega-3 Fatty Acids (FISH OIL) 1200 MG CAPS Take 1,200 mg by mouth daily.  glucose blood VI test strips (GILDARDO CONTOUR NEXT TEST) strip 1 each by In Vitro route 2 times daily DX: DM E11.9 100 each 3    GILDARDO MICROLET LANCETS MISC 1 applicator by Does not apply route 2 times daily DX: DM E11.9 60 each 5     No current facility-administered medications for this visit. No Known Allergies    Subjective:      Review of Systems   Constitutional: Negative for chills and fever. HENT: Negative for facial swelling. Gastrointestinal: Negative for abdominal pain, nausea and vomiting. Genitourinary: Positive for frequency, hematuria and urgency. Negative for dysuria, flank pain, hesitancy and incomplete emptying. All other systems reviewed and are negative. 14 systems reviewed and negative except as listed in HPI. Objective:     Physical Exam  Vitals signs and nursing note reviewed. Constitutional:       General: He is not in acute distress. Appearance: Normal appearance. He is well-developed. He is obese. He is not ill-appearing, toxic-appearing or diaphoretic. Comments: nontoxic   HENT:      Head: Normocephalic and atraumatic. Right Ear: External ear normal.      Left Ear: External ear normal.   Eyes:      General: No scleral icterus. Right eye: No discharge. Left eye: No discharge. Extraocular Movements: Extraocular movements intact. Conjunctiva/sclera: Conjunctivae normal.      Pupils: Pupils are equal, round, and reactive to light. Neck:      Musculoskeletal: Normal range of motion and neck supple. Cardiovascular:      Rate and Rhythm: Normal rate and regular rhythm. Pulses: Normal pulses. Heart sounds: Normal heart sounds. Pulmonary:      Effort: Pulmonary effort is normal.      Breath sounds: Normal breath sounds. Abdominal:      General: Bowel sounds are normal. There is no distension. Palpations: Abdomen is soft. There is no mass. Tenderness: There is no abdominal tenderness. There is no right CVA tenderness, left CVA tenderness, guarding or rebound. Musculoskeletal: Normal range of motion. General: No tenderness or deformity. Skin:     General: Skin is warm and dry. Capillary Refill: Capillary refill takes less than 2 seconds. Findings: No rash ( no rash to visible skin). Neurological:      General: No focal deficit present. Mental Status: He is alert and oriented to person, place, and time. Motor: No abnormal muscle tone. Coordination: Coordination normal.   Psychiatric:         Mood and Affect: Mood normal.         Behavior: Behavior normal.       /73 (Site: Left Upper Arm, Position: Sitting, Cuff Size: Large Adult)   Pulse 60   Temp 98.2 °F (36.8 °C) (Temporal)   SpO2 95%     Assessment:       Diagnosis Orders   1. Hematuria, unspecified type  POCT Urinalysis No Micro (Auto)    Culture, Urine   2.  UTI symptoms         Plan:      Results for POC orders placed in visit on 05/01/21   POCT Urinalysis No Micro (Auto)   Result Value Ref Range    Color, UA dark yellow/orange     Clarity, UA cloudy     Glucose, UA      Bilirubin, UA neg     Ketones, UA neg     Spec Grav, UA 1.020     Blood, UA POC large     pH, UA 6.0     Protein, UA POC 30     Urobilinogen, UA 1.0     Leukocytes, UA neg     Nitrite, UA neg    Urine dip positive for large blood  Urine culture pending  I reviewed his chart, same symptoms back in November of last year. Urine culture had no growth. He was supposed to follow-up with his urologist, he had kidney stones in the past, but he states he never did. Does not present as kidney stone at this time but I explained causes of hematuria. We will start Bactrim and wait for culture results. If positive for infection he will follow-up with family doctor for urine recheck after antibiotics completed, if no infection then he will follow-up with his urologist, he states his wife knows urologists name  Reasons for return and worsening of sx reviewed. Return for make appt for urine recheck after antibiotics done. Orders Placed This Encounter   Medications    sulfamethoxazole-trimethoprim (BACTRIM DS;SEPTRA DS) 800-160 MG per tablet     Sig: Take 1 tablet by mouth 2 times daily for 7 days     Dispense:  14 tablet     Refill:  0         Patient given educational materials - see patient instructions. Discussed use, benefit, and side effects of prescribed medications. All patient questions answered. Pt voicedunderstanding.     Electronically signed by Stevan Gowers, APRN - CNP on 5/1/2021 at 11:35 AM

## 2021-05-03 ENCOUNTER — PATIENT MESSAGE (OUTPATIENT)
Dept: FAMILY MEDICINE CLINIC | Age: 71
End: 2021-05-03

## 2021-05-03 DIAGNOSIS — R05.9 COUGH: Primary | ICD-10-CM

## 2021-05-03 DIAGNOSIS — U07.1 COVID-19: ICD-10-CM

## 2021-05-03 LAB
CULTURE: NO GROWTH
Lab: NORMAL
SPECIMEN DESCRIPTION: NORMAL

## 2021-05-03 NOTE — TELEPHONE ENCOUNTER
From: Martir Vyas  To: RILEY Mckeon - CNP  Sent: 5/3/2021 8:32 AM EDT  Subject: Test Results Question    And I forgot mention this morning it been clear but it still really

## 2021-05-18 ENCOUNTER — TELEMEDICINE (OUTPATIENT)
Dept: FAMILY MEDICINE CLINIC | Age: 71
End: 2021-05-18
Payer: MEDICARE

## 2021-05-18 DIAGNOSIS — R05.9 COUGH: Primary | ICD-10-CM

## 2021-05-18 DIAGNOSIS — I10 ESSENTIAL HYPERTENSION: ICD-10-CM

## 2021-05-18 DIAGNOSIS — R31.9 HEMATURIA, UNSPECIFIED TYPE: ICD-10-CM

## 2021-05-18 DIAGNOSIS — Z12.5 SCREENING FOR MALIGNANT NEOPLASM OF PROSTATE: ICD-10-CM

## 2021-05-18 DIAGNOSIS — J34.89 RHINORRHEA: ICD-10-CM

## 2021-05-18 DIAGNOSIS — E78.2 MIXED HYPERLIPIDEMIA: ICD-10-CM

## 2021-05-18 PROCEDURE — 99442 PR PHYS/QHP TELEPHONE EVALUATION 11-20 MIN: CPT | Performed by: NURSE PRACTITIONER

## 2021-05-18 RX ORDER — CETIRIZINE HYDROCHLORIDE 10 MG/1
10 TABLET ORAL DAILY
Qty: 30 TABLET | Refills: 0 | Status: SHIPPED | OUTPATIENT
Start: 2021-05-18 | End: 2021-06-17

## 2021-05-18 RX ORDER — METHYLPREDNISOLONE 4 MG/1
TABLET ORAL
Qty: 1 KIT | Refills: 0 | Status: SHIPPED | OUTPATIENT
Start: 2021-05-18 | End: 2021-05-24

## 2021-05-18 NOTE — PROGRESS NOTES
Jefferson Bocanegra is a 70 y.o. male evaluated via telephone on 5/18/2021. Consent:  He and/or health care decision maker is aware that that he may receive a bill for this telephone service, depending on his insurance coverage, and has provided verbal consent to proceed: Yes      Documentation:  I communicated with the patient and/or health care decision maker about:  Pt. Calling in today for discussion about a few things. Sinus/cough:  Recurrent problem with season changes. States he had this last fall and was given an oral steroid which worked great. He has clear constant runny nose and new dry cough from drainage. He does not want this to get out of control or worse and would like to try steroid again. He is not taking anything else otc.denies fever, chills, sob, wheezing or chest pain. Also has upcoming bladder scan and cysto with dr Richy Floyd for ongoing hematuria. Details of this discussion including any medical advice provided: pt. Advised I will send over daily allergy med for next few weeks to trial and ok to trial oral steroids if worsening after taking zyrtec nightly. neti pot or sinus rinses per pkg instructions TID PRN. push fluids ( water, Gatorade, tea), and rest as much as able. Call INB or worsening  Continue with plan for urology and send reports when complete. Pt agrees to plan. Update labs when able. Diagnosis Orders   1. Cough     2. Rhinorrhea     3. Hematuria, unspecified type  CBC Auto Differential   4. Mixed hyperlipidemia  Lipid Panel   5. Essential hypertension  Comprehensive Metabolic Panel    CBC Auto Differential   6. Screening for malignant neoplasm of prostate  PSA screening       I affirm this is a Patient Initiated Episode with a Patient who has not had a related appointment within my department in the past 7 days or scheduled within the next 24 hours.     Patient identification was verified at the start of the visit: Yes    Total Time: minutes: 11-20 minutes    The

## 2021-05-18 NOTE — PROGRESS NOTES
Visit Information    Have you changed or started any medications since your last visit including any over-the-counter medicines, vitamins, or herbal medicines? no   Have you stopped taking any of your medications? Is so, why? -  no  Are you having any side effects from any of your medications? - no    Have you seen any other physician or provider since your last visit?  no   Have you had any other diagnostic tests since your last visit?  no   Have you been seen in the emergency room and/or had an admission in a hospital since we last saw you?  no   Have you had your routine dental cleaning in the past 6 months?  no     Do you have an active MyChart account? If no, what is the barrier?   Yes    Patient Care Team:  RILEY Russell CNP as PCP - General (Family Nurse Practitioner)  RILEY Russell CNP as PCP - [facility] Provider    Medical History Review  Past Medical, Family, and Social History reviewed and  contribute to the patient presenting condition    Health Maintenance   Topic Date Due    COVID-19 Vaccine (1) Never done    Shingles Vaccine (2 of 3) 12/15/2016    Diabetic retinal exam  10/07/2020    Potassium monitoring  04/28/2021    Creatinine monitoring  04/28/2021    Lipid screen  04/28/2021    Hepatitis C screen  06/23/2021 (Originally 1950)    DTaP/Tdap/Td vaccine (1 - Tdap) 01/14/2022 (Originally 3/15/1969)    Diabetic foot exam  01/14/2022    A1C test (Diabetic or Prediabetic)  01/14/2022    Diabetic microalbuminuria test  01/14/2022    Annual Wellness Visit (AWV)  01/15/2022    Colon cancer screen fecal DNA test (Cologuard)  11/15/2022    Flu vaccine  Completed    Pneumococcal 65+ years Vaccine  Completed    AAA screen  Completed    Hepatitis A vaccine  Aged Out    Hib vaccine  Aged Out    Meningococcal (ACWY) vaccine  Aged Out

## 2021-05-19 ENCOUNTER — HOSPITAL ENCOUNTER (OUTPATIENT)
Age: 71
Setting detail: SPECIMEN
Discharge: HOME | End: 2021-05-19
Payer: MEDICARE

## 2021-05-19 DIAGNOSIS — R31.9 HEMATURIA, UNSPECIFIED TYPE: ICD-10-CM

## 2021-05-19 DIAGNOSIS — I10 ESSENTIAL HYPERTENSION: ICD-10-CM

## 2021-05-19 DIAGNOSIS — Z12.5 SCREENING FOR MALIGNANT NEOPLASM OF PROSTATE: ICD-10-CM

## 2021-05-19 DIAGNOSIS — E78.2 MIXED HYPERLIPIDEMIA: ICD-10-CM

## 2021-05-19 LAB
ABSOLUTE EOS #: 0.15 K/UL (ref 0–0.44)
ABSOLUTE IMMATURE GRANULOCYTE: <0.03 K/UL (ref 0–0.3)
ABSOLUTE LYMPH #: 1.08 K/UL (ref 1.1–3.7)
ABSOLUTE MONO #: 0.4 K/UL (ref 0.1–1.2)
ALBUMIN SERPL-MCNC: 4.2 G/DL (ref 3.5–5.2)
ALBUMIN/GLOBULIN RATIO: 1.4 (ref 1–2.5)
ALP BLD-CCNC: 53 U/L (ref 40–129)
ALT SERPL-CCNC: 30 U/L (ref 5–41)
ANION GAP SERPL CALCULATED.3IONS-SCNC: 12 MMOL/L (ref 9–17)
AST SERPL-CCNC: 32 U/L
BASOPHILS # BLD: 1 % (ref 0–2)
BASOPHILS ABSOLUTE: 0.04 K/UL (ref 0–0.2)
BILIRUB SERPL-MCNC: 1.05 MG/DL (ref 0.3–1.2)
BUN BLDV-MCNC: 16 MG/DL (ref 8–23)
BUN/CREAT BLD: ABNORMAL (ref 9–20)
CALCIUM SERPL-MCNC: 9 MG/DL (ref 8.6–10.4)
CHLORIDE BLD-SCNC: 100 MMOL/L (ref 98–107)
CHOLESTEROL/HDL RATIO: 3.6
CHOLESTEROL: 108 MG/DL
CO2: 25 MMOL/L (ref 20–31)
CREAT SERPL-MCNC: 0.99 MG/DL (ref 0.7–1.2)
DIFFERENTIAL TYPE: ABNORMAL
EOSINOPHILS RELATIVE PERCENT: 2 % (ref 1–4)
GFR AFRICAN AMERICAN: >60 ML/MIN
GFR NON-AFRICAN AMERICAN: >60 ML/MIN
GFR SERPL CREATININE-BSD FRML MDRD: ABNORMAL ML/MIN/{1.73_M2}
GFR SERPL CREATININE-BSD FRML MDRD: ABNORMAL ML/MIN/{1.73_M2}
GLUCOSE BLD-MCNC: 161 MG/DL (ref 70–99)
HCT VFR BLD CALC: 45.3 % (ref 40.7–50.3)
HDLC SERPL-MCNC: 30 MG/DL
HEMOGLOBIN: 14.8 G/DL (ref 13–17)
IMMATURE GRANULOCYTES: 0 %
LDL CHOLESTEROL: 58 MG/DL (ref 0–130)
LYMPHOCYTES # BLD: 18 % (ref 24–43)
MCH RBC QN AUTO: 32.2 PG (ref 25.2–33.5)
MCHC RBC AUTO-ENTMCNC: 32.7 G/DL (ref 28.4–34.8)
MCV RBC AUTO: 98.5 FL (ref 82.6–102.9)
MONOCYTES # BLD: 7 % (ref 3–12)
NRBC AUTOMATED: 0 PER 100 WBC
PDW BLD-RTO: 14.3 % (ref 11.8–14.4)
PLATELET # BLD: 148 K/UL (ref 138–453)
PLATELET ESTIMATE: ABNORMAL
PMV BLD AUTO: 11.8 FL (ref 8.1–13.5)
POTASSIUM SERPL-SCNC: 4.3 MMOL/L (ref 3.7–5.3)
PROSTATE SPECIFIC ANTIGEN: 1.6 UG/L
RBC # BLD: 4.6 M/UL (ref 4.21–5.77)
RBC # BLD: ABNORMAL 10*6/UL
SEG NEUTROPHILS: 72 % (ref 36–65)
SEGMENTED NEUTROPHILS ABSOLUTE COUNT: 4.47 K/UL (ref 1.5–8.1)
SODIUM BLD-SCNC: 137 MMOL/L (ref 135–144)
TOTAL PROTEIN: 7.2 G/DL (ref 6.4–8.3)
TRIGL SERPL-MCNC: 100 MG/DL
VLDLC SERPL CALC-MCNC: ABNORMAL MG/DL (ref 1–30)
WBC # BLD: 6.2 K/UL (ref 3.5–11.3)
WBC # BLD: ABNORMAL 10*3/UL

## 2021-05-23 RX ORDER — GLIMEPIRIDE 4 MG/1
TABLET ORAL
Qty: 90 TABLET | Refills: 3 | Status: SHIPPED | OUTPATIENT
Start: 2021-05-23 | End: 2021-08-16 | Stop reason: DRUGHIGH

## 2021-05-26 RX ORDER — GUAIFENESIN AND CODEINE PHOSPHATE 100; 10 MG/5ML; MG/5ML
5 SOLUTION ORAL 2 TIMES DAILY PRN
Qty: 240 ML | Refills: 0 | Status: SHIPPED | OUTPATIENT
Start: 2021-05-26 | End: 2021-06-02

## 2021-05-27 ENCOUNTER — TELEPHONE (OUTPATIENT)
Dept: FAMILY MEDICINE CLINIC | Age: 71
End: 2021-05-27

## 2021-06-02 RX ORDER — HYDROCHLOROTHIAZIDE 12.5 MG/1
CAPSULE, GELATIN COATED ORAL
Qty: 90 CAPSULE | Refills: 3 | Status: SHIPPED | OUTPATIENT
Start: 2021-06-02 | End: 2022-05-17 | Stop reason: SDUPTHER

## 2021-06-11 RX ORDER — COLESEVELAM 180 1/1
TABLET ORAL
Qty: 180 TABLET | Refills: 3 | Status: SHIPPED | OUTPATIENT
Start: 2021-06-11 | End: 2022-06-06

## 2021-06-29 ENCOUNTER — OFFICE VISIT (OUTPATIENT)
Dept: PRIMARY CARE CLINIC | Age: 71
End: 2021-06-29
Payer: MEDICARE

## 2021-06-29 VITALS
HEIGHT: 72 IN | OXYGEN SATURATION: 97 % | HEART RATE: 62 BPM | DIASTOLIC BLOOD PRESSURE: 79 MMHG | BODY MASS INDEX: 39.28 KG/M2 | SYSTOLIC BLOOD PRESSURE: 143 MMHG | WEIGHT: 290 LBS | TEMPERATURE: 98.2 F

## 2021-06-29 DIAGNOSIS — Z86.16 HISTORY OF COVID-19: ICD-10-CM

## 2021-06-29 DIAGNOSIS — J12.82 PNEUMONIA DUE TO 2019-NCOV: Primary | ICD-10-CM

## 2021-06-29 DIAGNOSIS — R05.9 COUGH: ICD-10-CM

## 2021-06-29 DIAGNOSIS — U07.1 PNEUMONIA DUE TO 2019-NCOV: Primary | ICD-10-CM

## 2021-06-29 PROCEDURE — 99213 OFFICE O/P EST LOW 20 MIN: CPT | Performed by: NURSE PRACTITIONER

## 2021-06-29 RX ORDER — DOXYCYCLINE HYCLATE 100 MG/1
100 CAPSULE ORAL 2 TIMES DAILY
Qty: 20 CAPSULE | Refills: 0 | Status: SHIPPED | OUTPATIENT
Start: 2021-06-29 | End: 2021-07-09

## 2021-06-29 ASSESSMENT — ENCOUNTER SYMPTOMS
CHEST TIGHTNESS: 0
SINUS PRESSURE: 0
VOICE CHANGE: 0
COUGH: 1
RHINORRHEA: 1
EYE REDNESS: 0
SHORTNESS OF BREATH: 1
EYE DISCHARGE: 0
SORE THROAT: 0
WHEEZING: 0

## 2021-06-29 NOTE — PROGRESS NOTES
[facility] [address] [facility] [place] [facility]  [address] [place] [address]  Dept: [phone]  Dept Fax: [phone]     Jimbo Hamilton is a 70 y.o. male who presents to the urgent care today for his medicalconditions/complaints as noted below. Jimbo Hamilton is c/o of Other (pt would like to have an xray he has been having some SOB )    HPI:      Cough  This is a new problem. Episode onset: 1-2 months ago. The problem has been unchanged. The cough is non-productive. Associated symptoms include rhinorrhea and shortness of breath (mild). Pertinent negatives include no chest pain, chills, ear pain, eye redness, fever, headaches, myalgias, nasal congestion, postnasal drip, rash, sore throat or wheezing. Treatments tried: otc tx. The treatment provided no relief. His past medical history is significant for environmental allergies. Was diagnosed and treated for COVID-19 in mid-May.     Past Medical History:   Diagnosis Date    Atherosclerosis of aortic bifurcation and common iliac arteries ([facility] [address] 12/13/2018    H/O cardiac catheterization 2003    Hyperlipidemia     on med    Hypertension     on med    Skin cancer     Type 2 diabetes mellitus (HCC)            Current Outpatient Medications   Medication Sig Dispense Refill    doxycycline hyclate (VIBRAMYCIN) 100 MG capsule Take 1 capsule by mouth 2 times daily for 10 days 20 capsule 0    colesevelam (WELCHOL) 625 MG tablet TAKE 1 TABLET TWICE A DAY WITH MEALS 180 tablet 3    hydroCHLOROthiazide (MICROZIDE) 12.5 MG capsule TAKE 1 CAPSULE DAILY 90 capsule 3    glimepiride (AMARYL) 4 MG tablet TAKE 1 TABLET EVERY MORNING BEFORE BREAKFAST 90 tablet 3    simvastatin (ZOCOR) 20 MG tablet TAKE 1 TABLET EVERY EVENING 90 tablet 0    metFORMIN (GLUCOPHAGE) 1000 MG tablet TAKE 1 TABLET TWICE A DAY WITH MEALS 180 tablet 3    atenolol (TENORMIN) 100 MG tablet TAKE 1 TABLET DAILY 90 tablet 3    amLODIPine-benazepril (LOTREL) 5-20 MG per capsule Take 1 tab po qd 90 capsule 3    aspirin 81 MG EC tablet Take 81 mg by mouth daily      glucose blood VI test strips (GILDARDO CONTOUR NEXT TEST) strip 1 each by In Vitro route 2 times daily DX: DM E11.9 100 each 3    GILDARDO MICROLET LANCETS MISC 1 applicator by Does not apply route 2 times daily DX: DM E11.9 60 each 5    Omega-3 Fatty Acids (FISH OIL) 1200 MG CAPS Take 1,200 mg by mouth daily. No current facility-administered medications for this visit. No Known Allergies    Reviewed PMH, SH, and  with the patient and updated. Subjective:      Review of Systems   Constitutional: Negative for chills, fatigue and fever. HENT: Positive for rhinorrhea. Negative for congestion, ear discharge, ear pain, postnasal drip, sinus pressure, sneezing, sore throat and voice change. Eyes: Negative for discharge and redness. Respiratory: Positive for cough and shortness of breath (mild). Negative for chest tightness and wheezing. Cardiovascular: Negative. Negative for chest pain. Musculoskeletal: Negative for myalgias. Skin: Negative for rash. Allergic/Immunologic: Positive for environmental allergies. Neurological: Negative for dizziness, weakness, light-headedness and headaches. Hematological: Negative for adenopathy. All other systems reviewed and are negative. Objective:      Physical Exam  Vitals and nursing note reviewed. Constitutional:       General: He is not in acute distress. Appearance: Normal appearance. He is well-developed. He is not ill-appearing, toxic-appearing or diaphoretic. HENT:      Head: Normocephalic. Right Ear: Tympanic membrane and external ear normal.      Left Ear: Tympanic membrane and external ear normal.      Nose: Nose normal.      Right Sinus: No maxillary sinus tenderness or frontal sinus tenderness. Left Sinus: No maxillary sinus tenderness or frontal sinus tenderness.       Mouth/Throat:      Pharynx: Posterior oropharyngeal erythema (mild) present. No oropharyngeal exudate. Eyes:      General:         Right eye: No discharge. Left eye: No discharge. Cardiovascular:      Rate and Rhythm: Normal rate and regular rhythm. Heart sounds: Normal heart sounds. No murmur heard. Pulmonary:      Effort: Pulmonary effort is normal. No respiratory distress. Breath sounds: Normal breath sounds. No wheezing or rales. Lymphadenopathy:      Cervical: No cervical adenopathy. Skin:     General: Skin is warm. Findings: No rash. Neurological:      Mental Status: He is alert. BP (!) 143/79 (Site: Left Upper Arm, Position: Sitting, Cuff Size: Large Adult)   Pulse 62   Temp 98.2 °F (36.8 °C) (Tympanic)   Ht 6' (1.829 m)   Wt 290 lb (131.5 kg)   SpO2 97%   BMI 39.33 kg/m²     Narrative   EXAMINATION:   TWO XRAY VIEWS OF THE CHEST       6/29/2021 10:35 am       COMPARISON:   03/04/2019       HISTORY:   ORDERING SYSTEM PROVIDED HISTORY: History of COVID-19   TECHNOLOGIST PROVIDED HISTORY:   cough x 1 month, hx of covid   Reason for Exam: cough x 1 month   Acuity: Acute   Type of Exam: Initial   Relevant Medical/Surgical History: hx of covid       FINDINGS:   Cardiomediastinal silhouette within normal limits.  Bibasilar pulmonary   opacities.  No pulmonary vascular congestion or edema.  No pneumothorax.           Impression   Bibasilar pulmonary opacities could represent scarring, atelectasis or   infection     Assessment:       Diagnosis Orders   1. Pneumonia due to 2019-nCoV  doxycycline hyclate (VIBRAMYCIN) 100 MG capsule   2. History of COVID-19  XR CHEST (2 VW)   3. Cough  XR CHEST (2 VW)     Plan:      CXR as noted above. Patient instructed to complete antibiotic prescription fully. Encouraged cough and deep breathing. May use Motrin/Tylenol for fever/pain. Educational materials provided on AVS.  Follow up if symptoms do not improve/worsen.     Orders Placed This Encounter   Medications    doxycycline hyclate (VIBRAMYCIN) 100 MG capsule     Sig: Take 1 capsule by mouth 2 times daily for 10 days     Dispense:  20 capsule     Refill:  0        Patient given educational materials - see patient instructions. Discussed use, benefit, and side effects of prescribed medications. All patientquestions answered. Pt voiced understanding.     Electronically signed by RILEY Woodard CNP on 6/29/2021at 11:48 AM

## 2021-08-15 RX ORDER — SIMVASTATIN 20 MG
TABLET ORAL
Qty: 90 TABLET | Refills: 0 | Status: SHIPPED | OUTPATIENT
Start: 2021-08-15 | End: 2021-11-15

## 2021-08-16 ENCOUNTER — OFFICE VISIT (OUTPATIENT)
Dept: FAMILY MEDICINE CLINIC | Age: 71
End: 2021-08-16
Payer: MEDICARE

## 2021-08-16 VITALS
DIASTOLIC BLOOD PRESSURE: 82 MMHG | BODY MASS INDEX: 38.79 KG/M2 | WEIGHT: 286.4 LBS | HEIGHT: 72 IN | OXYGEN SATURATION: 98 % | HEART RATE: 59 BPM | SYSTOLIC BLOOD PRESSURE: 130 MMHG | TEMPERATURE: 95.8 F

## 2021-08-16 DIAGNOSIS — E78.2 MIXED HYPERLIPIDEMIA: ICD-10-CM

## 2021-08-16 DIAGNOSIS — I10 ESSENTIAL HYPERTENSION: ICD-10-CM

## 2021-08-16 DIAGNOSIS — E11.9 TYPE 2 DIABETES MELLITUS WITHOUT COMPLICATION, WITHOUT LONG-TERM CURRENT USE OF INSULIN (HCC): Primary | ICD-10-CM

## 2021-08-16 LAB — HBA1C MFR BLD: 5.5 %

## 2021-08-16 PROCEDURE — 83036 HEMOGLOBIN GLYCOSYLATED A1C: CPT | Performed by: NURSE PRACTITIONER

## 2021-08-16 PROCEDURE — 99213 OFFICE O/P EST LOW 20 MIN: CPT | Performed by: NURSE PRACTITIONER

## 2021-08-16 RX ORDER — AMLODIPINE BESYLATE AND BENAZEPRIL HYDROCHLORIDE 5; 20 MG/1; MG/1
CAPSULE ORAL
Qty: 90 CAPSULE | Refills: 3 | Status: SHIPPED | OUTPATIENT
Start: 2021-08-16 | End: 2022-08-11

## 2021-08-16 RX ORDER — GLIMEPIRIDE 2 MG/1
2 TABLET ORAL
Qty: 90 TABLET | Refills: 1 | Status: SHIPPED | OUTPATIENT
Start: 2021-08-16 | End: 2022-02-14

## 2021-08-16 ASSESSMENT — ENCOUNTER SYMPTOMS
COUGH: 0
ABDOMINAL PAIN: 0
APNEA: 0
NAUSEA: 0
VOMITING: 0
SHORTNESS OF BREATH: 0
CHEST TIGHTNESS: 0
BACK PAIN: 0

## 2021-08-16 NOTE — PROGRESS NOTES
[facility] SPECIALTY [facility]  [address]  [place], [address]  [phone]      Bryson Jackson is a 70 y.o. male who presents today for his  medicalconditions/complaints as noted below. Bryson Jackson is c/o of Diabetes (taking supplement for diabetes for a month (Glucofort))  . HPI:    Diabetes  He presents for his follow-up diabetic visit. He has type 2 diabetes mellitus. Pertinent negatives for hypoglycemia include no dizziness, headaches, nervousness/anxiousness, speech difficulty, sweats or tremors. Pertinent negatives for diabetes include no chest pain, no fatigue, no polydipsia, no polyphagia, no polyuria and no weakness. There are no hypoglycemic complications. Diabetic complications include heart disease. Pertinent negatives for diabetic complications include no peripheral neuropathy or retinopathy. Risk factors for coronary artery disease include sedentary lifestyle, obesity, male sex, hypertension, diabetes mellitus and dyslipidemia. Current diabetic treatment includes diet and oral agent (dual therapy). He is compliant with treatment all of the time. His weight is stable. He is following a diabetic diet. Meal planning includes avoidance of concentrated sweets. He has not had a previous visit with a dietitian. He participates in exercise intermittently. There is no change in his home blood glucose trend. His overall blood glucose range is  mg/dl. An ACE inhibitor/angiotensin II receptor blocker is being taken. He does not see a podiatrist.    Has form for diabetic testing supplies, checking a few times per week  Continues with urology for blood in his urine and has upcoming scan.    Past Medical History:   Diagnosis Date    Atherosclerosis of aortic bifurcation and common iliac arteries ([facility] [address] 12/13/2018    H/O cardiac catheterization 2003    Hyperlipidemia     on med    Hypertension     on med    Skin cancer     Type 2 diabetes mellitus [facility])       Past Surgical History:   Procedure Laterality Date    CARDIAC CATHETERIZATION  several    COLONOSCOPY      INGUINAL HERNIA REPAIR Right     PENIS SURGERY      SKIN CANCER EXCISION      TIBIA FRACTURE SURGERY Left     UPPER GASTROINTESTINAL ENDOSCOPY       Family History   Problem Relation Age of Onset    Kidney Cancer Father     COPD Mother     Other Mother         aneurysm    No Known Problems Sister      Social History     Tobacco Use    Smoking status: Former Smoker     Packs/day: 1.00     Years: 20.00     Pack years: 20.00     Quit date: 1980     Years since quittin.5    Smokeless tobacco: Never Used   Substance Use Topics    Alcohol use: No      Current Outpatient Medications   Medication Sig Dispense Refill    glimepiride (AMARYL) 2 MG tablet Take 1 tablet by mouth every morning (before breakfast) 90 tablet 1    amLODIPine-benazepril (LOTREL) 5-20 MG per capsule Take 1 tab po qd 90 capsule 3    simvastatin (ZOCOR) 20 MG tablet TAKE 1 TABLET EVERY EVENING 90 tablet 0    colesevelam (WELCHOL) 625 MG tablet TAKE 1 TABLET TWICE A DAY WITH MEALS 180 tablet 3    hydroCHLOROthiazide (MICROZIDE) 12.5 MG capsule TAKE 1 CAPSULE DAILY 90 capsule 3    metFORMIN (GLUCOPHAGE) 1000 MG tablet TAKE 1 TABLET TWICE A DAY WITH MEALS 180 tablet 3    atenolol (TENORMIN) 100 MG tablet TAKE 1 TABLET DAILY 90 tablet 3    aspirin 81 MG EC tablet Take 81 mg by mouth daily      glucose blood VI test strips (GILDARDO CONTOUR NEXT TEST) strip 1 each by In Vitro route 2 times daily DX: DM E11.9 100 each 3    GILDARDO MICROLET LANCETS MISC 1 applicator by Does not apply route 2 times daily DX: DM E11.9 60 each 5    Omega-3 Fatty Acids (FISH OIL) 1200 MG CAPS Take 1,200 mg by mouth daily. No current facility-administered medications for this visit.      No Known Allergies    Health Maintenance   Topic Date Due    Hepatitis C screen  Never done    Shingles Vaccine (2 of 3) 12/15/2016    Diabetic retinal exam  10/07/2020  DTaP/Tdap/Td vaccine (1 - Tdap) 01/14/2022 (Originally 3/15/1969)    COVID-19 Vaccine (1) 08/11/2025 (Originally 3/15/1962)    Flu vaccine (1) 09/01/2021    Diabetic foot exam  01/14/2022    Diabetic microalbuminuria test  01/14/2022    Annual Wellness Visit (AWV)  01/15/2022    Lipid screen  05/19/2022    Potassium monitoring  05/19/2022    Creatinine monitoring  05/19/2022    A1C test (Diabetic or Prediabetic)  08/16/2022    Colon cancer screen fecal DNA test (Cologuard)  11/15/2022    Pneumococcal 65+ years Vaccine  Completed    AAA screen  Completed    Hepatitis A vaccine  Aged Out    Hib vaccine  Aged Out    Meningococcal (ACWY) vaccine  Aged Out       Subjective:      Review of Systems   Constitutional: Negative for activity change, appetite change, chills, diaphoresis, fatigue and fever. Eyes: Negative for visual disturbance. Respiratory: Negative for apnea, cough, chest tightness and shortness of breath. Cardiovascular: Negative for chest pain, palpitations and leg swelling. Gastrointestinal: Negative for abdominal pain, nausea and vomiting. No bowel or bladder control issues   Endocrine: Negative for polydipsia, polyphagia and polyuria. Genitourinary: Negative for flank pain. Musculoskeletal: Negative for arthralgias, back pain, joint swelling, myalgias, neck pain and neck stiffness. Skin: Negative for rash and wound. Neurological: Negative for dizziness, tremors, speech difficulty, weakness, numbness and headaches. Hematological: Negative for adenopathy. Psychiatric/Behavioral: Negative for sleep disturbance. The patient is not nervous/anxious. Objective:      Physical Exam  Vitals and nursing note reviewed. Constitutional:       General: He is not in acute distress. Appearance: Normal appearance. He is well-developed. He is obese. He is not ill-appearing or diaphoretic. HENT:      Head: Normocephalic and atraumatic.    Eyes: 25 05/19/2021 0823    BUN 16 05/19/2021 0823    CREATININE 0.99 05/19/2021 0823        Component Value Date/Time    CALCIUM 9.0 05/19/2021 0823    ALKPHOS 53 05/19/2021 0823    AST 32 05/19/2021 0823    ALT 30 05/19/2021 0823    BILITOT 1.05 05/19/2021 0823          Plan:      Return in about 6 months (around 2/16/2022) for return for bp check/HTN, AMV. Orders Placed This Encounter   Procedures    POCT glycosylated hemoglobin (Hb A1C)     Orders Placed This Encounter   Medications    glimepiride (AMARYL) 2 MG tablet     Sig: Take 1 tablet by mouth every morning (before breakfast)     Dispense:  90 tablet     Refill:  1    amLODIPine-benazepril (LOTREL) 5-20 MG per capsule     Sig: Take 1 tab po qd     Dispense:  90 capsule     Refill:  3     Lipids:  LDL-58  Continue statin at same dose and LF diet/exercise    DM: Well controlled  Reduce amaryl to 2 mg QD for now  Continue metformin BID  Recheck a1c in 6 months and call sooner if any low BG  Diabetic diet/exercise as tolerated  Continue statin, ASA and ACE  Continue to monitor BG a few times per month    HTN controlled  Continue same meds  Labs UTD    continue with dr. Vivien Marin for urology issues    Patient given educational materials - see patient instructions. Discussed use,benefit, and side effects of prescribed medications. All patient questions answered. Pt voiced understanding. Reviewed health maintenance. Instructed to continue currentmedications, diet and exercise.     Electronically signed by RILEY Wallace CNP, CNP on 8/16/2021 at 12:10 PM

## 2021-08-16 NOTE — PROGRESS NOTES
Visit Information    Have you changed or started any medications since your last visit including any over-the-counter medicines, vitamins, or herbal medicines? no   Have you stopped taking any of your medications? Is so, why? -  no  Are you having any side effects from any of your medications? - no    Have you seen any other physician or provider since your last visit?  no   Have you had any other diagnostic tests since your last visit?  no   Have you been seen in the emergency room and/or had an admission in a hospital since we last saw you?  no   Have you had your routine dental cleaning in the past 6 months?  no     Do you have an active MyChart account? If no, what is the barrier?   Yes    Patient Care Team:  RILEY Davies CNP as PCP - General (Family Nurse Practitioner)  RILEY Davies CNP [facility] PCP [facility]    Medical History Review  Past Medical, Family, and Social History reviewed and  contribute to the patient presenting condition    Health Maintenance   Topic Date Due    Hepatitis C screen  Never done    COVID-19 Vaccine (1) Never done    Shingles Vaccine (2 of 3) 12/15/2016    Diabetic retinal exam  10/07/2020    DTaP/Tdap/Td vaccine (1 - Tdap) 01/14/2022 (Originally 3/15/1969)    Flu vaccine (1) 09/01/2021    Diabetic foot exam  01/14/2022    A1C test (Diabetic or Prediabetic)  01/14/2022    Diabetic microalbuminuria test  01/14/2022    Annual Wellness Visit (AWV)  01/15/2022    Lipid screen  05/19/2022    Potassium monitoring  05/19/2022    Creatinine monitoring  05/19/2022    Colon cancer screen fecal DNA test (Cologuard)  11/15/2022    Pneumococcal 65+ years Vaccine  Completed    AAA screen  Completed    Hepatitis A vaccine  Aged Out    Hib vaccine  Aged Out    Meningococcal (ACWY) vaccine  Aged Out

## 2021-11-15 RX ORDER — SIMVASTATIN 20 MG
TABLET ORAL
Qty: 90 TABLET | Refills: 3 | Status: SHIPPED | OUTPATIENT
Start: 2021-11-15

## 2021-11-19 ENCOUNTER — OFFICE VISIT (OUTPATIENT)
Dept: PRIMARY CARE CLINIC | Age: 71
End: 2021-11-19
Payer: MEDICARE

## 2021-11-19 VITALS
HEART RATE: 59 BPM | TEMPERATURE: 97.2 F | DIASTOLIC BLOOD PRESSURE: 91 MMHG | SYSTOLIC BLOOD PRESSURE: 152 MMHG | OXYGEN SATURATION: 96 %

## 2021-11-19 DIAGNOSIS — R05.9 COUGH: Primary | ICD-10-CM

## 2021-11-19 DIAGNOSIS — E11.9 TYPE 2 DIABETES MELLITUS WITHOUT COMPLICATION, WITHOUT LONG-TERM CURRENT USE OF INSULIN (HCC): ICD-10-CM

## 2021-11-19 LAB — HBA1C MFR BLD: 5.7 %

## 2021-11-19 PROCEDURE — 83036 HEMOGLOBIN GLYCOSYLATED A1C: CPT | Performed by: FAMILY MEDICINE

## 2021-11-19 PROCEDURE — 99214 OFFICE O/P EST MOD 30 MIN: CPT | Performed by: FAMILY MEDICINE

## 2021-11-19 RX ORDER — METHYLPREDNISOLONE 4 MG/1
TABLET ORAL
Qty: 1 KIT | Refills: 0 | Status: SHIPPED | OUTPATIENT
Start: 2021-11-19 | End: 2021-11-25

## 2021-11-19 RX ORDER — CETIRIZINE HYDROCHLORIDE 10 MG/1
10 TABLET ORAL DAILY
Qty: 30 TABLET | Refills: 0 | Status: SHIPPED | OUTPATIENT
Start: 2021-11-19 | End: 2021-12-19

## 2021-11-19 ASSESSMENT — ENCOUNTER SYMPTOMS
NAUSEA: 0
DIARRHEA: 0
SHORTNESS OF BREATH: 0
SORE THROAT: 1
WHEEZING: 0
COUGH: 1
ABDOMINAL PAIN: 0
RHINORRHEA: 0
VOMITING: 0

## 2021-11-19 NOTE — PATIENT INSTRUCTIONS
Patient Education        Sinusitis: Care Instructions  Your Care Instructions     Sinusitis is an infection of the lining of the sinus cavities in your head. Sinusitis often follows a cold. It causes pain and pressure in your head and face. In most cases, sinusitis gets better on its own in 1 to 2 weeks. But some mild symptoms may last for several weeks. Sometimes antibiotics are needed. Follow-up care is a key part of your treatment and safety. Be sure to make and go to all appointments, and call your doctor if you are having problems. It's also a good idea to know your test results and keep a list of the medicines you take. How can you care for yourself at home? · Take an over-the-counter pain medicine, such as acetaminophen (Tylenol), ibuprofen (Advil, Motrin), or naproxen (Aleve). Read and follow all instructions on the label. · If the doctor prescribed antibiotics, take them as directed. Do not stop taking them just because you feel better. You need to take the full course of antibiotics. · Be careful when taking over-the-counter cold or flu medicines and Tylenol at the same time. Many of these medicines have acetaminophen, which is Tylenol. Read the labels to make sure that you are not taking more than the recommended dose. Too much acetaminophen (Tylenol) can be harmful. · Breathe warm, moist air from a steamy shower, a hot bath, or a sink filled with hot water. Avoid cold, dry air. Using a humidifier in your home may help. Follow the directions for cleaning the machine. · Use saline (saltwater) nasal washes. This can help keep your nasal passages open and wash out mucus and bacteria. You can buy saline nose drops at a grocery store or drugstore. Or you can make your own at home by adding 1 teaspoon of salt and 1 teaspoon of baking soda to 2 cups of distilled water. If you make your own, fill a bulb syringe with the solution, insert the tip into your nostril, and squeeze gently.  Trupti roberto nose.  · Put a hot, wet towel or a warm gel pack on your face 3 or 4 times a day for 5 to 10 minutes each time. · Try a decongestant nasal spray like oxymetazoline (Afrin). Do not use it for more than 3 days in a row. Using it for more than 3 days can make your congestion worse. When should you call for help? Call your doctor now or seek immediate medical care if:    · You have new or worse swelling or redness in your face or around your eyes.     · You have a new or higher fever. Watch closely for changes in your health, and be sure to contact your doctor if:    · You have new or worse facial pain.     · The mucus from your nose becomes thicker (like pus) or has new blood in it.     · You are not getting better as expected. Where can you learn more? Go to https://[employer]. org and sign in to your [employer] account. Enter Z205 in the Imm[employer] box to learn more about \"Sinusitis: Care Instructions. \"     If you do not have an account, please click on the \"Sign Up Now\" link. Current as of: December 2, 2020               Content Version: 13.0  © 4796-9279 Healthwise, [facility]. Care instructions adapted under license by [facility] ([facility]). If you have questions about a medical condition or this instruction, always ask your healthcare professional. [address]  any warranty or liability for your use of this information.        Take zyrtec and steroid as prescribed for cough  If symptoms worsen or do not improve please follow-up with PCP or return to clinic

## 2021-11-19 NOTE — PROGRESS NOTES
[facility] [address] [facility] [place] [facility]  [address] [address]  Dept: [phone]  Dept Fax: [phone]    Manda Davalos is a 70 y.o. male who presents today for his medical conditions/complaintsas noted below. Manda Davalos is c/o of Cough (slight laryngitis )        HPI:     Cough  This is a new problem. The current episode started in the past 7 days (5 days). The problem has been unchanged. The problem occurs constantly. The cough is non-productive. Associated symptoms include nasal congestion and a sore throat. Pertinent negatives include no chills, ear pain, fever, myalgias, rash, rhinorrhea, shortness of breath or wheezing. Nothing aggravates the symptoms. He has tried nothing for the symptoms. The treatment provided no relief. There is no history of asthma, COPD or environmental allergies. Past medical history of CAD, hypertension, diabetes type 2  Denies concern for COVID  NO known sick contacts  Patient reports when he has had these symptoms before a steroid has worked best to alleviate the symptoms.   Past Medical History:   Diagnosis Date    Atherosclerosis of aortic bifurcation and common iliac arteries ([facility] [address] 12/13/2018    H/O cardiac catheterization 2003    Hyperlipidemia     on med    Hypertension     on med    Skin cancer     Type 2 diabetes mellitus ([facility] [address]     Past medical history reviewed and pertinent positives/negatives in the HPI    Past Surgical History:   Procedure Laterality Date    CARDIAC CATHETERIZATION  several    COLONOSCOPY      INGUINAL HERNIA REPAIR Right     PENIS SURGERY      SKIN CANCER EXCISION      TIBIA FRACTURE SURGERY Left     UPPER GASTROINTESTINAL ENDOSCOPY         Family History   Problem Relation Age of Onset    Kidney Cancer Father     COPD Mother     Other Mother         aneurysm    No Known Problems Sister        Social History     Tobacco Use    Smoking status: Former Smoker     Packs/day: 1.00 Years: 20.00     Pack years: 20.00     Quit date: 1980     Years since quittin.8    Smokeless tobacco: Never Used   Substance Use Topics    Alcohol use: No      Current Outpatient Medications   Medication Sig Dispense Refill    cetirizine (ZYRTEC) 10 MG tablet Take 1 tablet by mouth daily 30 tablet 0    methylPREDNISolone (MEDROL, KHANG,) 4 MG tablet Take by mouth. 1 kit 0    simvastatin (ZOCOR) 20 MG tablet TAKE 1 TABLET EVERY EVENING 90 tablet 3    glimepiride (AMARYL) 2 MG tablet Take 1 tablet by mouth every morning (before breakfast) 90 tablet 1    amLODIPine-benazepril (LOTREL) 5-20 MG per capsule Take 1 tab po qd 90 capsule 3    colesevelam (WELCHOL) 625 MG tablet TAKE 1 TABLET TWICE A DAY WITH MEALS 180 tablet 3    hydroCHLOROthiazide (MICROZIDE) 12.5 MG capsule TAKE 1 CAPSULE DAILY 90 capsule 3    metFORMIN (GLUCOPHAGE) 1000 MG tablet TAKE 1 TABLET TWICE A DAY WITH MEALS 180 tablet 3    atenolol (TENORMIN) 100 MG tablet TAKE 1 TABLET DAILY 90 tablet 3    aspirin 81 MG EC tablet Take 81 mg by mouth daily      glucose blood VI test strips (GILDARDO CONTOUR NEXT TEST) strip 1 each by In Vitro route 2 times daily DX: DM E11.9 100 each 3    GILDARDO MICROLET LANCETS MISC 1 applicator by Does not apply route 2 times daily DX: DM E11.9 60 each 5    Omega-3 Fatty Acids (FISH OIL) 1200 MG CAPS Take 1,200 mg by mouth daily. No current facility-administered medications for this visit.      No Known Allergies    Health Maintenance   Topic Date Due    Hepatitis C screen  Never done    Shingles Vaccine (2 of 3) 12/15/2016    Diabetic retinal exam  10/07/2020    Flu vaccine (1) 2021    DTaP/Tdap/Td vaccine (1 - Tdap) 2022 (Originally 3/15/1969)    COVID-19 Vaccine (1) 2025 (Originally 3/15/1962)    Diabetic foot exam  2022    Diabetic microalbuminuria test  2022    Annual Wellness Visit (AWV)  01/15/2022    Lipid screen  2022    Potassium monitoring 05/19/2022    Creatinine monitoring  05/19/2022    Colon cancer screen fecal DNA test (Cologuard)  11/15/2022    A1C test (Diabetic or Prediabetic)  11/19/2022    Pneumococcal 65+ years Vaccine  Completed    AAA screen  Completed    Hepatitis A vaccine  Aged Out    Hib vaccine  Aged Out    Meningococcal (ACWY) vaccine  Aged Out       :      Review of Systems   Constitutional: Negative for chills and fever. HENT: Positive for congestion and sore throat. Negative for ear pain and rhinorrhea. Respiratory: Positive for cough. Negative for shortness of breath and wheezing. Gastrointestinal: Negative for abdominal pain, diarrhea, nausea and vomiting. Musculoskeletal: Negative for myalgias. Skin: Negative for pallor and rash. Allergic/Immunologic: Negative for environmental allergies. Hematological: Negative for adenopathy. Objective:     Physical Exam  Vitals and nursing note reviewed. Constitutional:       Appearance: Normal appearance. He is obese. HENT:      Head: Normocephalic and atraumatic. Right Ear: Hearing normal.      Left Ear: Hearing normal.      Nose: Nose normal.      Mouth/Throat:      Lips: Pink. Mouth: Mucous membranes are moist.      Pharynx: Oropharynx is clear. Eyes:      Extraocular Movements: Extraocular movements intact. Conjunctiva/sclera: Conjunctivae normal.   Cardiovascular:      Rate and Rhythm: Normal rate and regular rhythm. Heart sounds: Normal heart sounds. Pulmonary:      Effort: Pulmonary effort is normal.      Breath sounds: Normal breath sounds. Musculoskeletal:      Cervical back: Normal range of motion. No muscular tenderness. Skin:     General: Skin is warm and dry. Neurological:      Mental Status: He is alert and oriented to person, place, and time. Mental status is at baseline. Psychiatric:         Mood and Affect: Mood normal.         Behavior: Behavior normal.         Thought Content:  Thought content normal. Judgment: Judgment normal.       BP (!) 152/91 (Site: Right Upper Arm, Position: Sitting, Cuff Size: Large Adult)   Pulse 59   Temp 97.2 °F (36.2 °C) (Temporal)   SpO2 96%     Assessment:       Diagnosis Orders   1. Cough     2. Type 2 diabetes mellitus without complication, without long-term current use of insulin ([facility])  POCT glycosylated hemoglobin (Hb A1C)       Plan:   Take zyrtec and steroid as prescribed for cough  If symptoms worsen or do not improve please follow-up with PCP or return to clinic     Orders Placed This Encounter   Procedures    POCT glycosylated hemoglobin (Hb A1C)     Orders Placed This Encounter   Medications    cetirizine (ZYRTEC) 10 MG tablet     Sig: Take 1 tablet by mouth daily     Dispense:  30 tablet     Refill:  0    methylPREDNISolone (MEDROL, KHANG,) 4 MG tablet     Sig: Take by mouth. Dispense:  1 kit     Refill:  0      Patient given educational materials - see patient instructions. Discussed use, benefit, and side effects of prescribed medications. All patient questions answered. Pt voiced understanding. Follow up as directed.      Electronicallysigned by Connie Ott MD on 11/19/2021 at 12:14 PM

## 2021-12-29 ENCOUNTER — PATIENT MESSAGE (OUTPATIENT)
Dept: FAMILY MEDICINE CLINIC | Age: 71
End: 2021-12-29

## 2021-12-29 RX ORDER — CIPROFLOXACIN 500 MG/1
500 TABLET, FILM COATED ORAL 2 TIMES DAILY
Qty: 20 TABLET | Refills: 0 | Status: SHIPPED | OUTPATIENT
Start: 2021-12-29 | End: 2022-01-08

## 2021-12-29 NOTE — TELEPHONE ENCOUNTER
From: Johanna Márquez  To: Erika Grant  Sent: 12/29/2021 11:17 AM EST  Subject: Well I think I've got some kind of bladder or urinary tract infection because I'm urinating very very frequently and urgent like I can't even describe    I I went in the Urgent Care after I called a half an hour prior to that they didn't even get the message now there's 14 people ahead of me is there any chance you can call in a prescription to do something about this urgency of urinating I've had it probably for about a week and it seems like it's getting a little worse I know I'm asking you to go out on a limb but this is my last resort I don't know what else to do so please help if you can

## 2022-01-31 RX ORDER — ATENOLOL 100 MG/1
TABLET ORAL
Qty: 90 TABLET | Refills: 3 | Status: SHIPPED | OUTPATIENT
Start: 2022-01-31

## 2022-02-28 ENCOUNTER — OFFICE VISIT (OUTPATIENT)
Dept: FAMILY MEDICINE CLINIC | Age: 72
End: 2022-02-28
Payer: MEDICARE

## 2022-02-28 VITALS
HEIGHT: 72 IN | BODY MASS INDEX: 40.55 KG/M2 | HEART RATE: 76 BPM | DIASTOLIC BLOOD PRESSURE: 70 MMHG | TEMPERATURE: 96.1 F | OXYGEN SATURATION: 96 % | WEIGHT: 299.4 LBS | SYSTOLIC BLOOD PRESSURE: 134 MMHG

## 2022-02-28 DIAGNOSIS — Z12.5 SPECIAL SCREENING FOR MALIGNANT NEOPLASM OF PROSTATE: ICD-10-CM

## 2022-02-28 DIAGNOSIS — M54.31 RIGHT SIDED SCIATICA: ICD-10-CM

## 2022-02-28 DIAGNOSIS — Z23 NEED FOR VACCINATION AGAINST STREPTOCOCCUS PNEUMONIAE USING PNEUMOCOCCAL CONJUGATE VACCINE 13: ICD-10-CM

## 2022-02-28 DIAGNOSIS — Z11.59 NEED FOR HEPATITIS C SCREENING TEST: ICD-10-CM

## 2022-02-28 DIAGNOSIS — E78.2 MIXED HYPERLIPIDEMIA: ICD-10-CM

## 2022-02-28 DIAGNOSIS — Z00.00 MEDICARE ANNUAL WELLNESS VISIT, SUBSEQUENT: Primary | ICD-10-CM

## 2022-02-28 DIAGNOSIS — E11.9 TYPE 2 DIABETES MELLITUS WITHOUT COMPLICATION, WITHOUT LONG-TERM CURRENT USE OF INSULIN (HCC): ICD-10-CM

## 2022-02-28 DIAGNOSIS — Z00.00 ROUTINE GENERAL MEDICAL EXAMINATION AT A HEALTH CARE FACILITY: ICD-10-CM

## 2022-02-28 DIAGNOSIS — Z20.822 EXPOSURE TO COVID-19 VIRUS: ICD-10-CM

## 2022-02-28 LAB — HBA1C MFR BLD: 6.3 %

## 2022-02-28 PROCEDURE — 83036 HEMOGLOBIN GLYCOSYLATED A1C: CPT | Performed by: NURSE PRACTITIONER

## 2022-02-28 PROCEDURE — G0009 ADMIN PNEUMOCOCCAL VACCINE: HCPCS | Performed by: NURSE PRACTITIONER

## 2022-02-28 PROCEDURE — G0439 PPPS, SUBSEQ VISIT: HCPCS | Performed by: NURSE PRACTITIONER

## 2022-02-28 PROCEDURE — 99213 OFFICE O/P EST LOW 20 MIN: CPT | Performed by: NURSE PRACTITIONER

## 2022-02-28 PROCEDURE — 90670 PCV13 VACCINE IM: CPT | Performed by: NURSE PRACTITIONER

## 2022-02-28 RX ORDER — PREDNISONE 10 MG/1
10 TABLET ORAL 2 TIMES DAILY
Qty: 10 TABLET | Refills: 0 | Status: SHIPPED | OUTPATIENT
Start: 2022-02-28 | End: 2022-03-05

## 2022-02-28 RX ORDER — BENZONATATE 200 MG/1
200 CAPSULE ORAL 3 TIMES DAILY PRN
Qty: 30 CAPSULE | Refills: 0 | Status: SHIPPED | OUTPATIENT
Start: 2022-02-28 | End: 2022-03-07

## 2022-02-28 ASSESSMENT — LIFESTYLE VARIABLES
HOW MANY STANDARD DRINKS CONTAINING ALCOHOL DO YOU HAVE ON A TYPICAL DAY: 1 OR 2
HOW OFTEN DO YOU HAVE A DRINK CONTAINING ALCOHOL: MONTHLY OR LESS

## 2022-02-28 ASSESSMENT — PATIENT HEALTH QUESTIONNAIRE - PHQ9
1. LITTLE INTEREST OR PLEASURE IN DOING THINGS: 0
SUM OF ALL RESPONSES TO PHQ QUESTIONS 1-9: 0
2. FEELING DOWN, DEPRESSED OR HOPELESS: 0
SUM OF ALL RESPONSES TO PHQ9 QUESTIONS 1 & 2: 0
SUM OF ALL RESPONSES TO PHQ QUESTIONS 1-9: 0

## 2022-02-28 NOTE — PROGRESS NOTES
examination at a health care facility  -     Lipid Panel; Future  -     CBC with Auto Differential; Future  -     Comprehensive Metabolic Panel; Future    Mixed hyperlipidemia  -     Lipid Panel; Future    Exposure to COVID-19 virus  -     Covid-19, Antibody, Total; Future  Pt requested testing    Need for hepatitis C screening test  -     Hepatitis C Antibody; Future    Special screening for malignant neoplasm of prostate  -     PSA Screening; Future    Other orders  Ongoing for 1 week  Is already taking abx (bactrim/cipro by urology)  Add steroid and tessalon  Call INB or worsening will then do cxr    -     benzonatate (TESSALON) 200 MG capsule; Take 1 capsule by mouth 3 times daily as needed for Cough  -     predniSONE (DELTASONE) 10 MG tablet; Take 1 tablet by mouth 2 times daily for 5 days         Recommendations for Preventive Services Due: see orders and patient instructions/AVS.  Recommended screening schedule for the next 5-10 years is provided to the patient in written form: see Patient Instructions/AVS.     Return for Medicare Annual Wellness Visit in 1 year. Subjective   The following acute and/or chronic problems were also addressed today:  Type 2 diabetes, cough and right sided sciatica    Patient's complete Health Risk Assessment and screening values have been reviewed and are found in Flowsheets. The following problems were reviewed today and where indicated follow up appointments were made and/or referrals ordered.     Positive Risk Factor Screenings with Interventions:               General Health and ACP:  General  In general, how would you say your health is?: Good  In the past 7 days, have you experienced any of the following: New or Increased Pain, New or Increased Fatigue, Loneliness, Social Isolation, Stress or Anger?: No  Do you get the social and emotional support that you need?: (!) No  Do you have a Living Will?: (!) No    Advance Directives     Power of [facility] Will ACP-Advance Directive ACP-Power of     Not on File Not on File Not on File Not on File      General Health Risk Interventions:  · Inadequate social/emotional support: patient declines any further intervention for this issue  · No Living Will: Patient declines ACP discussion/assistance    Health Habits/Nutrition:     Physical Activity: Inactive    Days of Exercise per Week: 0 days    Minutes of Exercise per Session: 10 min     Have you lost any weight without trying in the past 3 months?: No  Body mass index: (!) 40.6  Have you seen the dentist within the past year?: (!) No    Health Habits/Nutrition Interventions:  · Nutritional issues:  patient agrees to work toward a weight loss goal of 20 pounds over the next 2 month(s) using the following plan: 1400 calorie/day diet, low carbohydrate diet, eliminate/reduce junk food, sugar, exercise for at least 150 minutes/week  · Dental exam overdue:  patient encouraged to make appointment with his/her dentist    Hearing/Vision:  Do you or your family notice any trouble with your hearing that hasn't been managed with hearing aids?: No  Do you have difficulty driving, watching TV, or doing any of your daily activities because of your eyesight?: No  Have you had an eye exam within the past year?: (!) No  No exam data present    Hearing/Vision Interventions:  · Vision concerns:  patient encouraged to make appointment with his/her eye specialist            Objective   Vitals:    02/28/22 0930   BP: 134/70   Site: Left Upper Arm   Position: Sitting   Cuff Size: Large Adult   Pulse: 76   Temp: 96.1 °F (35.6 °C)   TempSrc: Tympanic   SpO2: 96%   Weight: 299 lb 6.4 oz (135.8 kg)   Height: 6' (1.829 m)      Body mass index is 40.61 kg/m².         General Appearance: alert and oriented to person, place and time, well-developed and well-nourished, in no acute distress  Pulmonary/Chest: clear to auscultation bilaterally- no wheezes, rales or rhonchi, normal air movement, no respiratory distress  Cardiovascular: normal rate, normal S1 and S2, no gallops, intact distal pulses and no carotid bruits  Extremities: no cyanosis, no clubbing and no edema  Musculoskeletal: normal range of motion, no joint swelling, deformity or tenderness  Neurologic: gait and coordination normal and speech normal       No Known Allergies  Prior to Visit Medications    Medication Sig Taking? Authorizing Provider   benzonatate (TESSALON) 200 MG capsule Take 1 capsule by mouth 3 times daily as needed for Cough Yes RILEY Sharp CNP   predniSONE (DELTASONE) 10 MG tablet Take 1 tablet by mouth 2 times daily for 5 days Yes RILEY Sharp CNP   glimepiride (AMARYL) 2 MG tablet TAKE 1 TABLET EVERY MORNING BEFORE BREAKFAST Yes RILEY Sharp CNP   atenolol (TENORMIN) 100 MG tablet TAKE 1 TABLET DAILY Yes RILEY Sharp CNP   simvastatin (ZOCOR) 20 MG tablet TAKE 1 TABLET EVERY EVENING Yes RILEY Sharp CNP   amLODIPine-benazepril (LOTREL) 5-20 MG per capsule Take 1 tab po qd Yes RILEY Sharp CNP   colesevelam (WELCHOL) 625 MG tablet TAKE 1 TABLET TWICE A DAY WITH MEALS Yes RILEY Sharp CNP   hydroCHLOROthiazide (MICROZIDE) 12.5 MG capsule TAKE 1 CAPSULE DAILY Yes RILEY Sharp CNP   metFORMIN (GLUCOPHAGE) 1000 MG tablet TAKE 1 TABLET TWICE A DAY WITH MEALS Yes RILEY Sharp CNP   aspirin 81 MG EC tablet Take 81 mg by mouth daily Yes Historical Provider, MD   Omega-3 Fatty Acids (FISH OIL) 1200 MG CAPS Take 1,200 mg by mouth daily.  Yes Historical Provider, MD   simvastatin (ZOCOR) 20 MG tablet TAKE 1 TABLET EVERY EVENING  RILEY Sharp CNP   glucose blood VI test strips (GILDARDO CONTOUR NEXT TEST) strip 1 each by In Vitro route 2 times daily DX: DM E11.9  Maximiliano Acuña MD   GILDARDO MICROLET LANCETS MISC 1 applicator by Does not apply route 2 times daily DX: DM E11.9  MD Jaxon GrewalTe (Including outside providers/suppliers regularly involved in providing care):   Patient Care Team:  RILEY Hill CNP as PCP - General (Family Nurse Practitioner)  RILEY Hill CNP as PCP - [facility] Empaneled Provider    Reviewed and updated this visit:  Tobacco  Allergies  Meds  Problems  Med Hx  Surg Hx  Soc Hx  Fam Hx

## 2022-02-28 NOTE — PATIENT INSTRUCTIONS
Personalized Preventive Plan for Belkys Sam - 2/28/2022  Medicare offers a range of preventive health benefits. Some of the tests and screenings are paid in full while other may be subject to a deductible, co-insurance, and/or copay. Some of these benefits include a comprehensive review of your medical history including lifestyle, illnesses that may run in your family, and various assessments and screenings as appropriate. After reviewing your medical record and screening and assessments performed today your provider may have ordered immunizations, labs, imaging, and/or referrals for you. A list of these orders (if applicable) as well as your Preventive Care list are included within your After Visit Summary for your review. Other Preventive Recommendations:    · A preventive eye exam performed by an eye specialist is recommended every 1-2 years to screen for glaucoma; cataracts, macular degeneration, and other eye disorders. · A preventive dental visit is recommended every 6 months. · Try to get at least 150 minutes of exercise per week or 10,000 steps per day on a pedometer . · Order or download the FREE \"Exercise & Physical Activity: Your Everyday Guide\" from [employer]. Call 0-224.373.9163 or search [employer] online. · You need 1248-9287 mg of calcium and 0220-5970 IU of vitamin D per day. It is possible to meet your calcium requirement with diet alone, but a vitamin D supplement is usually necessary to meet this goal.  · When exposed to the sun, use a sunscreen that protects against both UVA and UVB radiation with an SPF of 30 or greater. Reapply every 2 to 3 hours or after sweating, drying off with a towel, or swimming. · Always wear a seat belt when traveling in a car. Always wear a helmet when riding a bicycle or motorcycle.   Patient Education        Therapeutic Ball: Back Exercises  Introduction  Here are some examples of typical exercises for your condition. Start each exercise slowly. Ease off the exercise if you start to have pain. Your doctor or physical therapist will tell you when you can start these exercises and which ones will work best for you. To prepare, make sure that your ball is the right size for you. When inflated and firm, it should allow you to sit with your hips and knees bent at about a 90-degree angle (like the letter L). How to do the exercises  Seated position on ball    Use this exercise to get used to moving on the ball and to find your best sitting position. Sit comfortably on the ball with your feet about hip-width apart. If you feel unsteady, rest your hands on the ball near your hips. As you do this exercise, try to keep your shoulders and upper body relaxed and still. Using your stomach and back muscles to move your pelvis, roll the ball forward. This will round your back. Still using your stomach and back muscles, roll the ball back. You will arch your back. Repeat this rounding-arching motion a few times. Stop in between the two positions, where your back is not rounded or arched. This is called your neutral position. Pelvic rotation    Sit tall on the ball. Slowly rotate your hips in a Ute pattern. Keep the movement focused at your hips. Repeat, but Ute in the other direction. Repeat 8 to 12 times. Postural sitting    Use this position to find a stable, relaxed posture on the ball. You can use this position as your starting point for other ball exercises. If you feel unsteady on the ball, start on a chair first.  Sit on a ball or chair, with your feet planted straight in front of you. Imagine that a string at the top of your head is pulling you straight up. Think of yourself as 2 inches taller than you are. Slightly tuck your chin. Keep your shoulders back and relaxed. Knee extension    Sit tall on the ball with your feet planted in front of you, hip-width apart.  As you do this exercise, avoid slumping your shoulders and arching your back. Rest your hands on the ball near your hip or a steady object next to you. (If you feel very stable on the ball, rest your hands in your lap or at your side.)  Slowly straighten one leg at the knee. Slowly lower it back down. Repeat with the other leg. Repeat this exercise 8 to 12 times. Roll-ups    Lie on your back with your knees bent, feet resting on the floor. Lay the ball on your thighs. Rest your hands up high on the ball. Raising your head and shoulder blades, roll the ball up your thighs. Exhale as you roll up. If this is hard on your neck, gently support your lower head and upper neck with one hand. Don't use that hand to pull your head up. Repeat 8 to 12 times. Ball curls    Lie on your back with your ankles resting on the ball, knees straight. Use your legs to roll the exercise ball toward you. Allow your knees to bend and move closer to your chest.  Pause briefly, and then roll the ball to the starting position. Try to keep the ball rolling straight. You will feel the muscles in your lower belly working. Repeat 8 to 12 times. Bridge with ball under legs    Lie on your back with your legs up, calves resting on the ball. For more challenge, rest your heels on the ball. Look up at the ceiling, and keep your chin relaxed. You can place a small pillow under your head or neck for comfort. With your arms by your side, press your hands onto the floor for stability. Tighten your belly muscles by pulling in your belly button toward your spine. Push your heels down toward the floor, squeeze your buttocks, and lift your hips off the floor until your shoulders, hips, and knees are all in a straight line. Try to keep the ball steady. Hold for about 6 seconds as you continue to breathe normally. Slowly lower your hips back down to the floor. Repeat 8 to 12 times. Ball curls with bridge    Start flat on your back with your ankles resting on the ball.   Look up at the ceiling, and keep your chin relaxed. You can place a small pillow under your head or neck for comfort. With your arms by your side, press your hands onto the floor for stability. Tighten your belly muscles by pulling in your belly button toward your spine. Push your heels down toward the floor, squeeze your buttocks, and lift your hips off the floor until your shoulders, hips, and knees are all in a straight line. While holding the bridge position, roll the ball toward you with your heels. Keep your hips as level as you can. Pause briefly, and then roll the ball back out. Try to keep the ball rolling straight. You will feel the muscles in your lower belly working as you straighten your legs. Lower your hips, and return to your starting position. Repeat 8 to 12 times. When you can keep your body and the ball steady throughout this exercise, you're ready for more challenge. Try keeping your hips raised while rolling the ball out, holding the bridge, and rolling back, a few times in a row. Praying ayleen Landry upright with the ball in front of you. To start, clasp your hands together. Rest them on the ball in front of you. As you do this exercise, keep your back and hips straight and tighten your belly and buttocks muscles. Keep your knees in place. Press on the ball with your arms. Lean forward from the knees. This rolls the ball forward. You will bear most of your weight on your arms. If your back starts to ache, you've gone too far. Pull back a bit. Roll back to the start position. Repeat 8 to 12 times. Walk-out plank on ball    Kneel over the ball. Place your hands on the floor in front of you. Walk your hands forward until your legs are straight on the ball. This is the plank position. When in plank position, hold your body straight and tighten your belly and buttocks muscles. Keep your chin slightly tucked.   Roll as far forward as you can without losing your balance or letting your hips drop. You may stop with the ball under your thighs, or even under your knees or shins. Hold a few seconds, then walk your hands back and return to the start position. Repeat 8 to 12 times. Push-up with thighs on ball    Kneel over the ball. Place your hands on the floor in front of you. Walk your hands forward until your legs are straight on the ball. This is the plank position. When in plank position, hold your body straight and tighten your belly and buttocks muscles. Keep your chin slightly tucked. Roll as far forward as you can without losing your balance or letting your hips drop. You may stop with the ball under your thighs, or even under your knees or shins. Bend your elbows. Slowly lower your body toward the ground as far as you can without losing your balance. If your wrists hurt, try moving your hands a little farther apart so they're not right under your shoulders. Slowly straighten your arms. Do 8 to 12 of these push-ups. Wall squat with ball    Stand facing away from a wall. Place your feet about shoulder-width apart. Place the ball between your middle back and the wall. Move your feet out in front of you so they are about a foot in front of your hips. Keep your arms at your sides, or put your hands on your hips. Slowly squat down as if you are going to sit in a chair, rolling your back over the ball as you squat. The ball should move with you but stay pressed into the wall. Be sure that your knees do not go in front of your toes as you squat. Hold for 6 seconds. Slowly rise to your standing position. Repeat 8 to 12 times. Child's pose with ball    Kneeling upright with your back straight, rest your hands on the ball in front of you. Breathe out as you bend at the hips, and roll the ball forward. Lower your chest toward the ground, and drop your hips back toward your heels. To stretch your upper back and shoulders, hold this position for 15 to 30 seconds.   Repeat 2 to 4 times.  Follow-up care is a key part of your treatment and safety. Be sure to make and go to all appointments, and call your doctor if you are having problems. It's also a good idea to know your test results and keep a list of the medicines you take. Where can you learn more? Go to https://chpepiceweb.[email or web address] org and sign in to your [employer] account. Enter E024 in the [employer] box to learn more about \"Therapeutic Ball: Back Exercises. \"     If you do not have an account, please click on the \"Sign Up Now\" link. Current as of: July 1, 2021               Content Version: 13.1  © 2006-2021 Healthwise, [employer]. Care instructions adapted under license by [facility] ([facility]). If you have questions about a medical condition or this instruction, always ask your healthcare professional. [place] [address] any warranty or liability for your use of this information. Patient Education        Back Stretches: Exercises  Introduction  Here are some examples of exercises for stretching your back. Start each exercise slowly. Ease off the exercise if you start to have pain. Your doctor or physical therapist will tell you when you can start these exercises and which ones will work best for you. How to do the exercises  Overhead stretch    Stand comfortably with your feet shoulder-width apart. Looking straight ahead, raise both arms over your head and reach toward the ceiling. Do not allow your head to tilt back. Hold for 15 to 30 seconds, then lower your arms to your sides. Repeat 2 to 4 times. Side stretch    Stand comfortably with your feet shoulder-width apart. Raise one arm over your head, and then lean to the other side. Slide your hand down your leg as you let the weight of your arm gently stretch your side muscles. Hold for 15 to 30 seconds. Repeat 2 to 4 times on each side. Press-up    Lie on your stomach, supporting your body with your forearms.   Press your elbows down into the floor to raise your upper back. As you do this, relax your stomach muscles and allow your back to arch without using your back muscles. As your press up, do not let your hips or pelvis come off the floor. Hold for 15 to 30 seconds, then relax. Repeat 2 to 4 times. Relax and rest    Lie on your back with a rolled towel under your neck and a pillow under your knees. Extend your arms comfortably to your sides. Relax and breathe normally. Remain in this position for about 10 minutes. If you can, do this 2 or 3 times each day. Follow-up care is a key part of your treatment and safety. Be sure to make and go to all appointments, and call your doctor if you are having problems. It's also a good idea to know your test results and keep a list of the medicines you take. Where can you learn more? Go to https://EyeScribespepicewFourandhalf.[employer]. org and sign in to your [employer] account. Enter G190 in the [employer] box to learn more about \"Back Stretches: Exercises. \"     If you do not have an account, please click on the \"Sign Up Now\" link. Current as of: July 1, 2021               Content Version: 13.1  © 2006-2021 Healthwise, Inc[employer]. Care instructions adapted under license by [facility] ([facility]). If you have questions about a medical condition or this instruction, always ask your healthcare professional. [place] [address] any warranty or liability for your use of this information. Patient Education        Hip Bursitis: Exercises  Introduction  Here are some examples of exercises for you to try. The exercises may be suggested for a condition or for rehabilitation. Start each exercise slowly. Ease off the exercises if you start to have pain. You will be told when to start these exercises and which ones will work best for you. How to do the exercises  Hip rotator stretch    Lie on your back with both knees bent and your feet flat on the floor.   Put the ankle of your your [facility] account. Enter W194 in the [facility] box to learn more about \"Hip Bursitis: Exercises. \"     If you do not have an account, please click on the \"Sign Up Now\" link. Current as of: July 1, 2021               Content Version: 13.1  © 2006-2021 [employer]. Care instructions adapted under license by [facility] ([facility]). If you have questions about a medical condition or this instruction, always ask your healthcare professional. [place] [address] any warranty or liability for your use of this information. Patient Education        Low Back Arthritis: Exercises  Introduction  Here are some examples of typical rehabilitation exercises for your condition. Start each exercise slowly. Ease off the exercise if you start to have pain. Your doctor or physical therapist will tell you when you can start these exercises and which ones will work best for you. When you are not being active, find a comfortable position for rest. Some people are comfortable on the floor or a medium-firm bed with a small pillow under their head and another under their knees. Some people prefer to lie on their side with a pillow between their knees. Don't stay in one position for too long. Take short walks (10 to 20 minutes) every 2 to 3 hours. Avoid slopes, hills, and stairs until you feel better. Walk only distances you can manage without pain, especially leg pain. How to do the exercises  Pelvic tilt    Lie on your back with your knees bent. \"Brace\" your stomach--tighten your muscles by pulling in and imagining your belly button moving toward your spine. Press your lower back into the floor. You should feel your hips and pelvis rock back. Hold for 6 seconds while breathing smoothly. Relax and allow your pelvis and hips to rock forward. Repeat 8 to 12 times. Back stretches    Get down on your hands and knees on the floor. Relax your head and allow it to droop.  Round your back up toward the ceiling until you feel a nice stretch in your upper, middle, and lower back. Hold this stretch for as long as it feels comfortable, or about 15 to 30 seconds. Return to the starting position with a flat back while you are on your hands and knees. Let your back sway by pressing your stomach toward the floor. Lift your buttocks toward the ceiling. Hold this position for 15 to 30 seconds. Repeat 2 to 4 times. Follow-up care is a key part of your treatment and safety. Be sure to make and go to all appointments, and call your doctor if you are having problems. It's also a good idea to know your test results and keep a list of the medicines you take. Where can you learn more? Go to https://[employer].net.[email or web address] org and sign in to your [employer]I account. Enter A559 in the [employer] box to learn more about \"Low Back Arthritis: Exercises. \"     If you do not have an account, please click on the \"Sign Up Now\" link. Current as of: July 1, 2021               Content Version: 13.1  © 4525-5408 Healthwise, Incorporated. Care instructions adapted under license by [facility] ([facility]). If you have questions about a medical condition or this instruction, always ask your healthcare professional. [address] any warranty or liability for your use of this information.

## 2022-02-28 NOTE — PROGRESS NOTES
Visit Information    Have you changed or started any medications since your last visit including any over-the-counter medicines, vitamins, or herbal medicines? no   Have you stopped taking any of your medications? Is so, why? -  no  Are you having any side effects from any of your medications? - no    Have you seen any other physician or provider since your last visit?  no   Have you had any other diagnostic tests since your last visit?  no   Have you been seen in the emergency room and/or had an admission in a hospital since we last saw you?  no   Have you had your routine dental cleaning in the past 6 months?  no     Do you have an active MyChart account? If no, what is the barrier? Yes    Patient Care Team:  RILEY Singletary CNP as PCP - General (Family Nurse Practitioner)  [facility]    Medical History Review  Past Medical, Family, and Social History reviewed and  contribute to the patient presenting condition    Health Maintenance   Topic Date Due    Hepatitis C screen  Never done    Depression Screen  Never done    DTaP/Tdap/Td vaccine (1 - Tdap) Never done    Shingles Vaccine (2 of 3) 12/15/2016    Diabetic retinal exam  10/07/2020    Flu vaccine (1) 09/01/2021    Diabetic foot exam  01/14/2022    Diabetic microalbuminuria test  01/14/2022    Annual Wellness Visit (AWV)  01/15/2022    COVID-19 Vaccine (1) 08/11/2025 (Originally 3/15/1955)    Lipid screen  05/19/2022    Potassium monitoring  05/19/2022    Creatinine monitoring  05/19/2022    Colorectal Cancer Screen  11/15/2022    A1C test (Diabetic or Prediabetic)  11/19/2022    Pneumococcal 65+ years Vaccine  Completed    AAA screen  Completed    Hepatitis A vaccine  Aged Out    Hib vaccine  Aged Out    Meningococcal (ACWY) vaccine  Aged Out     After obtaining consent, and per orders of Gypsy Drew CNP, injection of Prevnar given in Right deltoid by Puneet Bonner MA.  Patient instructed to remain in clinic for 20 minutes afterwards, and to report any adverse reaction to me immediately.

## 2022-03-01 ENCOUNTER — HOSPITAL ENCOUNTER (OUTPATIENT)
Age: 72
Setting detail: SPECIMEN
Discharge: HOME | End: 2022-03-01

## 2022-03-01 DIAGNOSIS — E11.9 TYPE 2 DIABETES MELLITUS WITHOUT COMPLICATION, WITHOUT LONG-TERM CURRENT USE OF INSULIN (HCC): ICD-10-CM

## 2022-03-01 DIAGNOSIS — Z20.822 EXPOSURE TO COVID-19 VIRUS: ICD-10-CM

## 2022-03-01 DIAGNOSIS — Z12.5 SPECIAL SCREENING FOR MALIGNANT NEOPLASM OF PROSTATE: ICD-10-CM

## 2022-03-01 DIAGNOSIS — Z00.00 ROUTINE GENERAL MEDICAL EXAMINATION AT A HEALTH CARE FACILITY: ICD-10-CM

## 2022-03-01 DIAGNOSIS — E78.2 MIXED HYPERLIPIDEMIA: ICD-10-CM

## 2022-03-01 DIAGNOSIS — Z11.59 NEED FOR HEPATITIS C SCREENING TEST: ICD-10-CM

## 2022-03-01 LAB
ABSOLUTE EOS #: 0.18 K/UL (ref 0–0.44)
ABSOLUTE IMMATURE GRANULOCYTE: 0.04 K/UL (ref 0–0.3)
ABSOLUTE LYMPH #: 1.62 K/UL (ref 1.1–3.7)
ABSOLUTE MONO #: 0.52 K/UL (ref 0.1–1.2)
ALBUMIN SERPL-MCNC: 4.3 G/DL (ref 3.5–5.2)
ALBUMIN/GLOBULIN RATIO: 1.6 (ref 1–2.5)
ALP BLD-CCNC: 57 U/L (ref 40–129)
ALT SERPL-CCNC: 32 U/L (ref 5–41)
ANION GAP SERPL CALCULATED.3IONS-SCNC: 18 MMOL/L (ref 9–17)
AST SERPL-CCNC: 31 U/L
BASOPHILS # BLD: 1 % (ref 0–2)
BASOPHILS ABSOLUTE: 0.09 K/UL (ref 0–0.2)
BILIRUB SERPL-MCNC: 0.89 MG/DL (ref 0.3–1.2)
BUN BLDV-MCNC: 19 MG/DL (ref 8–23)
CALCIUM SERPL-MCNC: 9.5 MG/DL (ref 8.6–10.4)
CHLORIDE BLD-SCNC: 104 MMOL/L (ref 98–107)
CHOLESTEROL/HDL RATIO: 3.6
CHOLESTEROL: 120 MG/DL
CO2: 20 MMOL/L (ref 20–31)
CREAT SERPL-MCNC: 0.83 MG/DL (ref 0.7–1.2)
CREATININE URINE: 114.6 MG/DL (ref 39–259)
EOSINOPHILS RELATIVE PERCENT: 3 % (ref 1–4)
GFR AFRICAN AMERICAN: >60 ML/MIN
GFR NON-AFRICAN AMERICAN: >60 ML/MIN
GFR SERPL CREATININE-BSD FRML MDRD: ABNORMAL ML/MIN/{1.73_M2}
GLUCOSE BLD-MCNC: 169 MG/DL (ref 70–99)
HCT VFR BLD CALC: 43.1 % (ref 40.7–50.3)
HDLC SERPL-MCNC: 33 MG/DL
HEMOGLOBIN: 14.4 G/DL (ref 13–17)
HEPATITIS C ANTIBODY: NONREACTIVE
IMMATURE GRANULOCYTES: 1 %
LDL CHOLESTEROL: 56 MG/DL (ref 0–130)
LYMPHOCYTES # BLD: 24 % (ref 24–43)
MCH RBC QN AUTO: 32.3 PG (ref 25.2–33.5)
MCHC RBC AUTO-ENTMCNC: 33.4 G/DL (ref 28.4–34.8)
MCV RBC AUTO: 96.6 FL (ref 82.6–102.9)
MICROALBUMIN/CREAT 24H UR: 123 MG/L
MICROALBUMIN/CREAT UR-RTO: 107 MCG/MG CREAT
MONOCYTES # BLD: 8 % (ref 3–12)
NRBC AUTOMATED: 0 PER 100 WBC
PDW BLD-RTO: 13.3 % (ref 11.8–14.4)
PLATELET # BLD: 181 K/UL (ref 138–453)
PMV BLD AUTO: 11.6 FL (ref 8.1–13.5)
POTASSIUM SERPL-SCNC: 4.3 MMOL/L (ref 3.7–5.3)
PROSTATE SPECIFIC ANTIGEN: 1.42 UG/L
RBC # BLD: 4.46 M/UL (ref 4.21–5.77)
SARS-COV-2 ANTIBODY, TOTAL: POSITIVE
SEG NEUTROPHILS: 63 % (ref 36–65)
SEGMENTED NEUTROPHILS ABSOLUTE COUNT: 4.37 K/UL (ref 1.5–8.1)
SODIUM BLD-SCNC: 142 MMOL/L (ref 135–144)
TOTAL PROTEIN: 7 G/DL (ref 6.4–8.3)
TRIGL SERPL-MCNC: 154 MG/DL
VITAMIN B-12: 1408 PG/ML (ref 232–1245)
WBC # BLD: 6.8 K/UL (ref 3.5–11.3)

## 2022-05-13 DIAGNOSIS — E11.9 TYPE 2 DIABETES MELLITUS WITHOUT COMPLICATION, WITHOUT LONG-TERM CURRENT USE OF INSULIN (HCC): ICD-10-CM

## 2022-05-13 RX ORDER — GLIMEPIRIDE 2 MG/1
TABLET ORAL
Qty: 90 TABLET | Refills: 3 | Status: SHIPPED | OUTPATIENT
Start: 2022-05-13

## 2022-05-17 ENCOUNTER — PATIENT MESSAGE (OUTPATIENT)
Dept: FAMILY MEDICINE CLINIC | Age: 72
End: 2022-05-17

## 2022-05-17 RX ORDER — HYDROCHLOROTHIAZIDE 12.5 MG/1
CAPSULE, GELATIN COATED ORAL
Qty: 90 CAPSULE | Refills: 1 | Status: SHIPPED | OUTPATIENT
Start: 2022-05-17 | End: 2022-06-17

## 2022-05-17 NOTE — TELEPHONE ENCOUNTER
From: Reba Rosales  To: Clare Levi  Sent: 5/17/2022 7:46 AM EDT  Subject: Prescription reorder    [facility] sent me a message telling me there's no more refill  Hydrchlorothiazide 12.5

## 2022-05-23 ENCOUNTER — OFFICE VISIT (OUTPATIENT)
Dept: PRIMARY CARE CLINIC | Age: 72
End: 2022-05-23
Payer: MEDICARE

## 2022-05-23 VITALS
OXYGEN SATURATION: 97 % | DIASTOLIC BLOOD PRESSURE: 80 MMHG | TEMPERATURE: 97.1 F | SYSTOLIC BLOOD PRESSURE: 162 MMHG | HEART RATE: 63 BPM

## 2022-05-23 DIAGNOSIS — R03.0 ELEVATED BLOOD PRESSURE READING: ICD-10-CM

## 2022-05-23 DIAGNOSIS — L03.116 CELLULITIS OF LEFT FOOT: Primary | ICD-10-CM

## 2022-05-23 PROCEDURE — 99213 OFFICE O/P EST LOW 20 MIN: CPT

## 2022-05-23 RX ORDER — CEPHALEXIN 500 MG/1
500 CAPSULE ORAL 4 TIMES DAILY
Qty: 28 CAPSULE | Refills: 0 | Status: SHIPPED | OUTPATIENT
Start: 2022-05-23 | End: 2022-05-30

## 2022-05-23 SDOH — ECONOMIC STABILITY: FOOD INSECURITY: WITHIN THE PAST 12 MONTHS, YOU WORRIED THAT YOUR FOOD WOULD RUN OUT BEFORE YOU GOT MONEY TO BUY MORE.: NEVER TRUE

## 2022-05-23 SDOH — ECONOMIC STABILITY: FOOD INSECURITY: WITHIN THE PAST 12 MONTHS, THE FOOD YOU BOUGHT JUST DIDN'T LAST AND YOU DIDN'T HAVE MONEY TO GET MORE.: NEVER TRUE

## 2022-05-23 ASSESSMENT — ENCOUNTER SYMPTOMS
DIARRHEA: 0
PHOTOPHOBIA: 0
FACIAL SWELLING: 0
GASTROINTESTINAL NEGATIVE: 1
SINUS PRESSURE: 0
CONSTIPATION: 0
CHEST TIGHTNESS: 0
SORE THROAT: 0
STRIDOR: 0
EYE ITCHING: 0
COUGH: 0
NAUSEA: 0
VOICE CHANGE: 0
RESPIRATORY NEGATIVE: 1
CHOKING: 0
COLOR CHANGE: 0
RECTAL PAIN: 0
EYES NEGATIVE: 1
SINUS PAIN: 0
RHINORRHEA: 0
BLOOD IN STOOL: 0
SHORTNESS OF BREATH: 0
EYE PAIN: 0
TROUBLE SWALLOWING: 0
ANAL BLEEDING: 0
EYE DISCHARGE: 0
ABDOMINAL PAIN: 0
APNEA: 0
EYE REDNESS: 0
VOMITING: 0
WHEEZING: 0
ABDOMINAL DISTENTION: 0

## 2022-05-23 ASSESSMENT — SOCIAL DETERMINANTS OF HEALTH (SDOH): HOW HARD IS IT FOR YOU TO PAY FOR THE VERY BASICS LIKE FOOD, HOUSING, MEDICAL CARE, AND HEATING?: NOT HARD AT ALL

## 2022-05-23 NOTE — PATIENT INSTRUCTIONS
Patient Education        Cellulitis: Care Instructions  Your Care Instructions     Cellulitis is a skin infection caused by bacteria, most often strep or staph. It often occurs after a break in the skin from a scrape, cut, bite, orpuncture, or after a rash. Cellulitis may be treated without doing tests to find out what caused it. But your doctor may do tests, if needed, to look for a specific bacteria, like methicillin-resistant Staphylococcus aureus (MRSA). The doctor has checked you carefully, but problems can develop later. If you notice any problems or new symptoms, get medical treatment right away. Follow-up care is a key part of your treatment and safety. Be sure to make and go to all appointments, and call your doctor if you are having problems. It's also a good idea to know your test results and keep alist of the medicines you take. How can you care for yourself at home?  Take your antibiotics as directed. Do not stop taking them just because you feel better. You need to take the full course of antibiotics.  Prop up the infected area on pillows to reduce pain and swelling. Try to keep the area above the level of your heart as often as you can.  If your doctor told you how to care for your wound, follow your doctor's instructions. If you did not get instructions, follow this general advice:  ? Wash the wound with clean water 2 times a day. Don't use hydrogen peroxide or alcohol, which can slow healing. ? You may cover the wound with a thin layer of petroleum jelly, such as Vaseline, and a nonstick bandage. ? Apply more petroleum jelly and replace the bandage as needed.  Be safe with medicines. Take pain medicines exactly as directed. ? If the doctor gave you a prescription medicine for pain, take it as prescribed. ? If you are not taking a prescription pain medicine, ask your doctor if you can take an over-the-counter medicine.   To prevent cellulitis in the future   Try to prevent cuts, scrapes, or other injuries to your skin. Cellulitis most often occurs where there is a break in the skin.  If you get a scrape, cut, mild burn, or bite, wash the wound with clean water as soon as you can to help avoid infection. Don't use hydrogen peroxide or alcohol, which can slow healing.  If you have swelling in your legs (edema), support stockings and good skin care may help prevent leg sores and cellulitis.  Take care of your feet, especially if you have diabetes or other conditions that increase the risk of infection. Wear shoes and socks. Do not go barefoot. If you have athlete's foot or other skin problems on your feet, talk to your doctor about how to treat them. When should you call for help? Call your doctor now or seek immediate medical care if:     You have signs that your infection is getting worse, such as:  ? Increased pain, swelling, warmth, or redness. ? Red streaks leading from the area. ? Pus draining from the area. ? A fever.      You get a rash. Watch closely for changes in your health, and be sure to contact your doctor if:     You do not get better as expected. Where can you learn more? Go to https://Ziplocal.Savant Systems. org and sign in to your [employer] account. Enter U492 in the [facility] box to learn more about \"Cellulitis: Care Instructions. \"     If you do not have an account, please click on the \"Sign Up Now\" link. Current as of: November 15, 2021               Content Version: 13.2  © 2006-2022 Healthwise, Incorporated. Care instructions adapted under license by [facility] [place] ([facility]). If you have questions about a medical condition or this instruction, always ask your healthcare professional. [place] [address] any warranty or liability for your use of this information.

## 2022-05-23 NOTE — PROGRESS NOTES
[address] WALK IN CARE  [facility] [id number] [place] [address] WALK IN CARE  [address]  [place] [address]  Dept: [phone]    Adi Clemente is a 67 y.o. male Established patient, who presents to the walk-in clinic today with conditions/complaints as noted below:    Chief Complaint   Patient presents with    Foot Pain     left foot pain, redness- no known injury          HPI:     Foot Pain   The pain is present in the left foot. This is a new problem. Episode onset: 3 days ago. There has been no history of extremity trauma. The problem occurs constantly. The problem has been gradually worsening. Quality: sore. The pain is moderate. Pertinent negatives include no fever, inability to bear weight, itching, joint locking, joint swelling, limited range of motion, numbness, stiffness or tingling. Treatments tried: honey and dressing. The treatment provided mild relief. Past Medical History:   Diagnosis Date    Atherosclerosis of aortic bifurcation and common iliac arteries ([facility] [address] 12/13/2018    H/O cardiac catheterization 2003    Hyperlipidemia     on med    Hypertension     on med    Skin cancer     Type 2 diabetes mellitus (HCC)        Current Outpatient Medications   Medication Sig Dispense Refill    cephALEXin (KEFLEX) 500 MG capsule Take 1 capsule by mouth 4 times daily for 7 days 28 capsule 0    mupirocin (BACTROBAN) 2 % ointment Apply topically 3 times daily.  30 g 0    hydroCHLOROthiazide (MICROZIDE) 12.5 MG capsule TAKE 1 CAPSULE DAILY 90 capsule 1    glimepiride (AMARYL) 2 MG tablet TAKE 1 TABLET EVERY MORNING BEFORE BREAKFAST 90 tablet 3    metFORMIN (GLUCOPHAGE) 1000 MG tablet TAKE 1 TABLET TWICE A DAY WITH MEALS 180 tablet 3    atenolol (TENORMIN) 100 MG tablet TAKE 1 TABLET DAILY 90 tablet 3    simvastatin (ZOCOR) 20 MG tablet TAKE 1 TABLET EVERY EVENING 90 tablet 3    amLODIPine-benazepril (LOTREL) 5-20 MG per capsule Take 1 tab po qd 90 capsule 3    colesevelam (WELCHOL) 625 MG tablet TAKE 1 TABLET TWICE A DAY WITH MEALS 180 tablet 3    aspirin 81 MG EC tablet Take 81 mg by mouth daily      Omega-3 Fatty Acids (FISH OIL) 1200 MG CAPS Take 1,200 mg by mouth daily.  glucose blood VI test strips (GILDARDO CONTOUR NEXT TEST) strip 1 each by In Vitro route 2 times daily DX: DM E11.9 100 each 3    GILDARDO MICROLET LANCETS MISC 1 applicator by Does not apply route 2 times daily DX: DM E11.9 60 each 5     No current facility-administered medications for this visit. No Known Allergies    Review of Systems:     Review of Systems   Constitutional: Negative. Negative for activity change, appetite change, chills, diaphoresis, fatigue, fever and unexpected weight change. HENT: Negative for congestion, dental problem, drooling, ear discharge, ear pain, facial swelling, hearing loss, mouth sores, nosebleeds, postnasal drip, rhinorrhea, sinus pressure, sinus pain, sneezing, sore throat, tinnitus, trouble swallowing and voice change. Eyes: Negative. Negative for photophobia, pain, discharge, redness, itching and visual disturbance. Respiratory: Negative. Negative for apnea, cough, choking, chest tightness, shortness of breath, wheezing and stridor. Cardiovascular: Negative. Negative for chest pain, palpitations and leg swelling. Gastrointestinal: Negative. Negative for abdominal distention, abdominal pain, anal bleeding, blood in stool, constipation, diarrhea, nausea, rectal pain and vomiting. Musculoskeletal: Negative for stiffness. Skin: Negative for color change, itching, pallor, rash and wound. Left foot redness     Neurological: Negative. Negative for dizziness, tingling, tremors, seizures, syncope, facial asymmetry, speech difficulty, weakness, light-headedness, numbness and headaches.        Physical Exam: BP (!) 162/80   Pulse 63   Temp 97.1 °F (36.2 °C) (Infrared)   SpO2 97%     Physical Exam  Vitals reviewed. Constitutional:       Appearance: Normal appearance. He is normal weight. HENT:      Head: Normocephalic and atraumatic. Right Ear: Tympanic membrane, ear canal and external ear normal.      Left Ear: Tympanic membrane, ear canal and external ear normal.      Nose: Nose normal.      Mouth/Throat:      Mouth: Mucous membranes are moist.      Pharynx: Oropharynx is clear. Eyes:      Extraocular Movements: Extraocular movements intact. Conjunctiva/sclera: Conjunctivae normal.      Pupils: Pupils are equal, round, and reactive to light. Cardiovascular:      Rate and Rhythm: Normal rate and regular rhythm. Pulses: Normal pulses. Heart sounds: Normal heart sounds. Pulmonary:      Effort: Pulmonary effort is normal.      Breath sounds: Normal breath sounds. Abdominal:      General: Abdomen is flat. Bowel sounds are normal.      Palpations: Abdomen is soft. Musculoskeletal:         General: Normal range of motion. Cervical back: Normal range of motion and neck supple. Skin:     General: Skin is warm and dry. Capillary Refill: Capillary refill takes less than 2 seconds. Findings: Erythema present. Comments: Localized erythema of the dorsal aspect of left food, area is warm and dry to the touch. Cleansed area with wound cleanser to clean honey off, dried, applied triple abx and covered with non adherent dressing, compression dressing applied. Neurological:      General: No focal deficit present. Mental Status: He is alert and oriented to person, place, and time. Mental status is at baseline. Psychiatric:         Mood and Affect: Mood normal.         Behavior: Behavior normal.         Plan:          1. Cellulitis of left foot  -     cephALEXin (KEFLEX) 500 MG capsule;  Take 1 capsule by mouth 4 times daily for 7 days, Disp-28 capsule, R-0Normal  - mupirocin (BACTROBAN) 2 % ointment; Apply topically 3 times daily. , Disp-30 g, R-0, Normal  2. Elevated blood pressure reading    Advised patient to monitor blood pressure, if continues to be elevated-- follow up with PCP for further management. Patient expressed understanding. Continue epsom salt soaks TID as tolerated. Continue over-the-counter medications as needed for symptoms. Go to the ER for shortness of breath, chest pain. Patient verbalized understanding. Follow Up Instructions:      Return if symptoms worsen or fail to improve, for SOB, chest pain go to ER. Orders Placed This Encounter   Medications    cephALEXin (KEFLEX) 500 MG capsule     Sig: Take 1 capsule by mouth 4 times daily for 7 days     Dispense:  28 capsule     Refill:  0    mupirocin (BACTROBAN) 2 % ointment     Sig: Apply topically 3 times daily. Dispense:  30 g     Refill:  0             Patient and/or parent given educational materials - see patient instructions. Discussed use, benefit, and side effects of prescribed medications. All patient questions answered. Patient and/or parent voiced understanding.       Electronically signed by RILEY Rehman 5/23/2022 at 11:54 AM

## 2022-06-06 RX ORDER — COLESEVELAM 180 1/1
TABLET ORAL
Qty: 180 TABLET | Refills: 3 | Status: SHIPPED | OUTPATIENT
Start: 2022-06-06

## 2022-06-17 ENCOUNTER — OFFICE VISIT (OUTPATIENT)
Dept: FAMILY MEDICINE CLINIC | Age: 72
End: 2022-06-17
Payer: MEDICARE

## 2022-06-17 VITALS
HEIGHT: 72 IN | TEMPERATURE: 97.2 F | BODY MASS INDEX: 39.77 KG/M2 | SYSTOLIC BLOOD PRESSURE: 136 MMHG | OXYGEN SATURATION: 97 % | WEIGHT: 293.6 LBS | HEART RATE: 64 BPM | DIASTOLIC BLOOD PRESSURE: 82 MMHG

## 2022-06-17 DIAGNOSIS — E78.2 MIXED HYPERLIPIDEMIA: ICD-10-CM

## 2022-06-17 DIAGNOSIS — I10 ESSENTIAL HYPERTENSION: ICD-10-CM

## 2022-06-17 DIAGNOSIS — E11.9 TYPE 2 DIABETES MELLITUS WITHOUT COMPLICATION, WITHOUT LONG-TERM CURRENT USE OF INSULIN (HCC): Primary | ICD-10-CM

## 2022-06-17 LAB — HBA1C MFR BLD: 6.2 %

## 2022-06-17 PROCEDURE — 3044F HG A1C LEVEL LT 7.0%: CPT | Performed by: NURSE PRACTITIONER

## 2022-06-17 PROCEDURE — 99213 OFFICE O/P EST LOW 20 MIN: CPT | Performed by: NURSE PRACTITIONER

## 2022-06-17 PROCEDURE — 1123F ACP DISCUSS/DSCN MKR DOCD: CPT | Performed by: NURSE PRACTITIONER

## 2022-06-17 PROCEDURE — 83036 HEMOGLOBIN GLYCOSYLATED A1C: CPT | Performed by: NURSE PRACTITIONER

## 2022-06-17 RX ORDER — HYDROCHLOROTHIAZIDE 25 MG/1
25 TABLET ORAL EVERY MORNING
Qty: 30 TABLET | Refills: 1 | Status: SHIPPED | OUTPATIENT
Start: 2022-06-17

## 2022-06-17 RX ORDER — TAMSULOSIN HYDROCHLORIDE 0.4 MG/1
0.4 CAPSULE ORAL DAILY
COMMUNITY
Start: 2022-06-10

## 2022-06-17 ASSESSMENT — ENCOUNTER SYMPTOMS
DIARRHEA: 0
SINUS PAIN: 0
SHORTNESS OF BREATH: 0
BACK PAIN: 0
VOMITING: 0
ABDOMINAL PAIN: 0
NAUSEA: 0
EYE PAIN: 0
SORE THROAT: 0
COUGH: 0

## 2022-06-17 NOTE — PROGRESS NOTES
Visit Information    Have you changed or started any medications since your last visit including any over-the-counter medicines, vitamins, or herbal medicines? no   Have you stopped taking any of your medications? Is so, why? -  no  Are you having any side effects from any of your medications? - no    Have you seen any other physician or provider since your last visit?  no   Have you had any other diagnostic tests since your last visit?  no   Have you been seen in the emergency room and/or had an admission in a hospital since we last saw you?  no   Have you had your routine dental cleaning in the past 6 months?  no     Do you have an active MyChart account? If no, what is the barrier?   Yes    Patient Care Team:  RILEY Lawson CNP as PCP - General (Family Nurse Practitioner)  RILEY Lawson CNP as PCP - [facility]    Medical History Review  Past Medical, Family, and Social History reviewed and  contribute to the patient presenting condition    Health Maintenance   Topic Date Due    Shingles vaccine (2 of 3) 12/15/2016    Diabetic retinal exam  10/07/2020    Diabetic foot exam  01/14/2022    DTaP/Tdap/Td vaccine (1 - Tdap) 02/28/2023 (Originally 3/15/1969)    COVID-19 Vaccine (1) 08/11/2025 (Originally 3/15/1955)    Flu vaccine (Season Ended) 09/01/2022    Colorectal Cancer Screen  11/15/2022    A1C test (Diabetic or Prediabetic)  02/28/2023    Depression Screen  02/28/2023    Diabetic microalbuminuria test  03/01/2023    Lipids  03/01/2023    Annual Wellness Visit (AWV)  03/01/2023    Pneumococcal 65+ years Vaccine  Completed    AAA screen  Completed    Hepatitis C screen  Completed    Hepatitis A vaccine  Aged Out    Hib vaccine  Aged Out    Meningococcal (ACWY) vaccine  Aged Out

## 2022-06-17 NOTE — PATIENT INSTRUCTIONS
Patient Education        High Blood Pressure: Care Instructions  Overview     It's normal for blood pressure to go up and down throughout the day. But if it stays up, you have high blood pressure. Another name for high blood pressure ishypertension. Despite what a lot of people think, high blood pressure usually doesn't cause headaches or make you feel dizzy or lightheaded. It usually has no symptoms. But it does increase your risk of stroke, heart attack, and other problems. You and your doctor will talk about your risks of these problems based on yourblood pressure. Your doctor will give you a goal for your blood pressure. Your goal will bebased on your health and your age. Lifestyle changes, such as eating healthy and being active, are always important to help lower blood pressure. You might also take medicine to reachyour blood pressure goal.  Follow-up care is a key part of your treatment and safety. Be sure to make and go to all appointments, and call your doctor if you are having problems. It's also a good idea to know your test results and keep alist of the medicines you take. How can you care for yourself at home? Medical treatment   If you stop taking your medicine, your blood pressure will go back up. You may take one or more types of medicine to lower your blood pressure. Be safe with medicines. Take your medicine exactly as prescribed. Call your doctor if you think you are having a problem with your medicine.  Talk to your doctor before you start taking aspirin every day. Aspirin can help certain people lower their risk of a heart attack or stroke. But taking aspirin isn't right for everyone, because it can cause serious bleeding.  See your doctor regularly. You may need to see the doctor more often at first or until your blood pressure comes down.  If you are taking blood pressure medicine, talk to your doctor before you take decongestants or anti-inflammatory medicine, such as ibuprofen. Some of these medicines can raise blood pressure.  Learn how to check your blood pressure at home. Lifestyle changes   Stay at a healthy weight. This is especially important if you put on weight around the waist. Losing even 10 pounds can help you lower your blood pressure.  If your doctor recommends it, get more exercise. Walking is a good choice. Bit by bit, increase the amount you walk every day. Try for at least 30 minutes on most days of the week. You also may want to swim, bike, or do other activities.  Avoid or limit alcohol. Talk to your doctor about whether you can drink any alcohol.  Try to limit how much sodium you eat to less than 2,300 milligrams (mg) a day. Your doctor may ask you to try to eat less than 1,500 mg a day.  Eat plenty of fruits (such as bananas and oranges), vegetables, legumes, whole grains, and low-fat dairy products.  Lower the amount of saturated fat in your diet. Saturated fat is found in animal products such as milk, cheese, and meat. Limiting these foods may help you lose weight and also lower your risk for heart disease.  Do not smoke. Smoking increases your risk for heart attack and stroke. If you need help quitting, talk to your doctor about stop-smoking programs and medicines. These can increase your chances of quitting for good. When should you call for help? Call 911  anytime you think you may need emergency care. This may mean having symptoms that suggest that your blood pressure is causing a serious heart or blood vessel problem. Your blood pressure may be over 180/120. For example, call 911 if:     You have symptoms of a heart attack. These may include:  ? Chest pain or pressure, or a strange feeling in the chest.  ? Sweating. ? Shortness of breath. ? Nausea or vomiting. ? Pain, pressure, or a strange feeling in the back, neck, jaw, or upper belly or in one or both shoulders or arms. ? Lightheadedness or sudden weakness.   ? A fast or irregular heartbeat.      You have symptoms of a stroke. These may include:  ? Sudden numbness, tingling, weakness, or loss of movement in your face, arm, or leg, especially on only one side of your body. ? Sudden vision changes. ? Sudden trouble speaking. ? Sudden confusion or trouble understanding simple statements. ? Sudden problems with walking or balance. ? A sudden, severe headache that is different from past headaches.      You have severe back or belly pain. Do not wait until your blood pressure comes down on its own. Get help right away. Call your doctor now or seek immediate care if:     Your blood pressure is much higher than normal (such as 180/120 or higher), but you don't have symptoms.      You think high blood pressure is causing symptoms, such as:  ? Severe headache.  ? Blurry vision. Watch closely for changes in your health, and be sure to contact your doctor if:     Your blood pressure measures higher than your doctor recommends at least 2 times. That means the top number is higher or the bottom number is higher, or both.      You think you may be having side effects from your blood pressure medicine. Where can you learn more? Go to https://Social InsightpeRecruit.net.Tempolib. org and sign in to your [employer] account. Enter H215 in the Tag & See box to learn more about \"High Blood Pressure: Care Instructions. \"     If you do not have an account, please click on the \"Sign Up Now\" link. Current as of: January 10, 2022               Content Version: 13.2  © 2214-6169 Healthwise, [employer]. Care instructions adapted under license by [facility] ([facility]). If you have questions about a medical condition or this instruction, always ask your healthcare professional. [place] [address] any warranty or liability for your use of this information.

## 2022-06-17 NOTE — PROGRESS NOTES
[id number] [address] [facility]  [address]  [address] [facility] [address]  Dept: [phone]  Dept Fax: [phone]    Osmany Dickson is a 67 y.o. male who presents today for his medicalconditions/complaints as noted below. Osmany Dickson is c/o of Diabetes and Hypertension (not sure if BP machine is giving consistent readings,no symptoms,mainly systolic going up and down,brought log,not sure if med needs changed since he has been on atenolol for about 30 yrs)      HPI:       67 y.o male presents for med check    Type 2 dm, managed with metformin 2000, glimepiride 2, welchol 625 bid, a1c today 6.2    Htn, managed with atenolol 100, amlodipine 5, benzapril 20, hctz 12.5. Has had several blood pressure readings at home and at the ER. Hyperlipidemia, managed with simvastatin 20, last lipids and liver stable    Had recent prostate biopsy, saw urology, had persistent hematuria and went to ER, has been improving.        Past Medical History:   Diagnosis Date    Atherosclerosis of aortic bifurcation and common iliac arteries ([facility] [address] 12/13/2018    H/O cardiac catheterization 2003    Hyperlipidemia     on med    Hypertension     on med    Skin cancer     Type 2 diabetes mellitus (HCC)         Current Outpatient Medications   Medication Sig Dispense Refill    tamsulosin (FLOMAX) 0.4 MG capsule take 1 capsule by mouth at bedtime      hydroCHLOROthiazide (HYDRODIURIL) 25 MG tablet Take 1 tablet by mouth every morning 30 tablet 1    colesevelam (WELCHOL) 625 MG tablet TAKE 1 TABLET TWICE A DAY WITH MEALS 180 tablet 3    glimepiride (AMARYL) 2 MG tablet TAKE 1 TABLET EVERY MORNING BEFORE BREAKFAST 90 tablet 3    metFORMIN (GLUCOPHAGE) 1000 MG tablet TAKE 1 TABLET TWICE A DAY WITH MEALS 180 tablet 3    atenolol (TENORMIN) 100 MG tablet TAKE 1 TABLET DAILY 90 tablet 3    simvastatin (ZOCOR) 20 MG tablet TAKE 1 TABLET EVERY EVENING 90 tablet 3    amLODIPine-benazepril (LOTREL) 5-20 MG per capsule Take 1 tab po qd 90 capsule 3    aspirin 81 MG EC tablet Take 81 mg by mouth daily      Omega-3 Fatty Acids (FISH OIL) 1200 MG CAPS Take 1,200 mg by mouth daily.  glucose blood VI test strips (GILDARDO CONTOUR NEXT TEST) strip 1 each by In Vitro route 2 times daily DX: DM E11.9 100 each 3    GILDARDO MICROLET LANCETS MISC 1 applicator by Does not apply route 2 times daily DX: DM E11.9 60 each 5     No current facility-administered medications for this visit. No Known Allergies    Subjective:      Review of Systems   Constitutional: Negative for chills and fatigue. HENT: Negative for congestion, ear pain, sinus pain and sore throat. Eyes: Negative for pain and visual disturbance. Respiratory: Negative for cough and shortness of breath. Cardiovascular: Negative for chest pain and palpitations. Gastrointestinal: Negative for abdominal pain, diarrhea, nausea and vomiting. Genitourinary: Negative for penile pain and testicular pain. Musculoskeletal: Negative for back pain, joint swelling and neck pain. Skin: Negative for rash. Neurological: Negative for dizziness and light-headedness. Hematological: Does not bruise/bleed easily. All other systems reviewed and are negative.      :Objective     Physical Exam  Vitals and nursing note reviewed. Constitutional:       General: He is not in acute distress. Appearance: Normal appearance. He is not toxic-appearing. Cardiovascular:      Rate and Rhythm: Normal rate. Pulmonary:      Effort: Pulmonary effort is normal.      Breath sounds: Normal breath sounds. Skin:     General: Skin is warm. Neurological:      General: No focal deficit present. Mental Status: He is alert and oriented to person, place, and time.        /82 (Site: Right Upper Arm, Position: Sitting, Cuff Size: Large Adult)   Pulse 64   Temp 97.2 °F (36.2 °C) (Tympanic)   Ht 6' (1.829 m)   Wt 293 lb 9.6 oz (133.2 kg)   SpO2 97%   BMI 39.82 kg/m²     Lab Review   Hospital Outpatient Visit on 03/01/2022   Component Date Value    SARS-CoV-2 Antibody, Tot* 03/01/2022 POSITIVE*    Hepatitis C Ab 03/01/2022 NONREACTIVE     PSA 03/01/2022 1.42     Microalb, Ur 03/01/2022 123*    Creatinine, Ur 03/01/2022 114.6     Microalb/Crt.  Ratio 03/01/2022 107*    Vitamin B-12 03/01/2022 1408*    Glucose 03/01/2022 169*    BUN 03/01/2022 19     CREATININE 03/01/2022 0.83     Calcium 03/01/2022 9.5     Sodium 03/01/2022 142     Potassium 03/01/2022 4.3     Chloride 03/01/2022 104     CO2 03/01/2022 20     Anion Gap 03/01/2022 18*    Alkaline Phosphatase 03/01/2022 57     ALT 03/01/2022 32     AST 03/01/2022 31     Total Bilirubin 03/01/2022 0.89     Total Protein 03/01/2022 7.0     Albumin 03/01/2022 4.3     Albumin/Globulin Ratio 03/01/2022 1.6     GFR Non- 03/01/2022 >60     GFR  03/01/2022 >60     GFR Comment 03/01/2022          WBC 03/01/2022 6.8     RBC 03/01/2022 4.46     Hemoglobin 03/01/2022 14.4     Hematocrit 03/01/2022 43.1     MCV 03/01/2022 96.6     MCH 03/01/2022 32.3     MCHC 03/01/2022 33.4     RDW 03/01/2022 13.3     Platelets 04/35/0956 181     MPV 03/01/2022 11.6     NRBC Automated 03/01/2022 0.0     Seg Neutrophils 03/01/2022 63     Lymphocytes 03/01/2022 24     Monocytes 03/01/2022 8     Eosinophils % 03/01/2022 3     Basophils 03/01/2022 1     Immature Granulocytes 03/01/2022 1*    Segs Absolute 03/01/2022 4.37     Absolute Lymph # 03/01/2022 1.62     Absolute Mono # 03/01/2022 0.52     Absolute Eos # 03/01/2022 0.18     Basophils Absolute 03/01/2022 0.09     Absolute Immature Granul* 03/01/2022 0.04     Cholesterol 03/01/2022 120     HDL 03/01/2022 33*    LDL Cholesterol 03/01/2022 56     Chol/HDL Ratio 03/01/2022 3.6     Triglycerides 03/01/2022 154*   Office Visit on 02/28/2022   Component Date Value    Hemoglobin A1C 02/28/2022 6.3        Assessment and Plan      1. Type 2 diabetes mellitus without complication, without long-term current use of insulin (HCC)  -     POCT glycosylated hemoglobin (Hb A1C)  2. Essential hypertension  -     hydroCHLOROthiazide (HYDRODIURIL) 25 MG tablet; Take 1 tablet by mouth every morning, Disp-30 tablet, R-1Normal  -     Basic Metabolic Panel; Future  3. Mixed hyperlipidemia     a1c stable  Increase on hctz to 25  Monitor bmp  Recheck 2 months, sooner prn            Results for orders placed or performed in visit on 06/17/22   POCT glycosylated hemoglobin (Hb A1C)   Result Value Ref Range    Hemoglobin A1C 6.2 %             Return in about 2 months (around 8/17/2022), or if symptoms worsen or fail to improve, for bp check. Orders Placed This Encounter   Medications    hydroCHLOROthiazide (HYDRODIURIL) 25 MG tablet     Sig: Take 1 tablet by mouth every morning     Dispense:  30 tablet     Refill:  1        Patient given educational materials - see patient instructions. Discussed use, benefit, and side effects of prescribed medications. All patientquestions answered. Pt voiced understanding. Patient given educational materials - see patient instructions. Discussed use, benefit, and side effects of prescribed medications. All patientquestions answered. Pt voiced understanding. This note was transcribed using dictation with Dragon services. Efforts were made to correct any errors but some words may be misinterpreted.     Patient assumes risks associated with failure to complete recommended testing and treatments in a timely manner    Electronically signed by RILEY Crenshaw CNP on 6/17/2022at 10:03 AM

## 2022-08-11 RX ORDER — AMLODIPINE BESYLATE AND BENAZEPRIL HYDROCHLORIDE 5; 20 MG/1; MG/1
CAPSULE ORAL
Qty: 90 CAPSULE | Refills: 3 | Status: SHIPPED | OUTPATIENT
Start: 2022-08-11

## 2022-09-22 ENCOUNTER — NURSE ONLY (OUTPATIENT)
Dept: FAMILY MEDICINE CLINIC | Age: 72
End: 2022-09-22
Payer: MEDICARE

## 2022-09-22 ENCOUNTER — TELEPHONE (OUTPATIENT)
Dept: FAMILY MEDICINE CLINIC | Age: 72
End: 2022-09-22

## 2022-09-22 DIAGNOSIS — N20.0 KIDNEY STONE: Primary | ICD-10-CM

## 2022-09-22 DIAGNOSIS — Z23 NEED FOR INFLUENZA VACCINATION: Primary | ICD-10-CM

## 2022-09-22 PROCEDURE — 90694 VACC AIIV4 NO PRSRV 0.5ML IM: CPT | Performed by: NURSE PRACTITIONER

## 2022-09-22 PROCEDURE — G0008 ADMIN INFLUENZA VIRUS VAC: HCPCS | Performed by: NURSE PRACTITIONER

## 2022-09-22 NOTE — TELEPHONE ENCOUNTER
Patient requesting referral for urology w/ Markie Wayne MD due to a large kidney stone. Please advise.

## 2022-09-22 NOTE — PROGRESS NOTES
After obtaining consent, and per orders of Luis Hernandez CNP, injection of HD flu vaccine given in Right deltoid by Megan Gamez MA. Patient instructed to remain in clinic for 20 minutes afterwards, and to report any adverse reaction to me immediately. Vaccine Information Sheet, \"Influenza - Inactivated\"  given to Kevin Kimball, or parent/legal guardian of  Kevin Kimball and verbalized understanding. Patient responses:    Have you ever had a reaction to a flu vaccine? No  Are you able to eat eggs without adverse effects? Yes  Do you have any current illness? No  Have you ever had Guillian Ruth Syndrome? No    Flu vaccine given per order. Please see immunization tab.

## 2022-10-06 ENCOUNTER — OFFICE VISIT (OUTPATIENT)
Dept: FAMILY MEDICINE CLINIC | Age: 72
End: 2022-10-06
Payer: MEDICARE

## 2022-10-06 VITALS
RESPIRATION RATE: 18 BRPM | BODY MASS INDEX: 39.14 KG/M2 | HEART RATE: 60 BPM | DIASTOLIC BLOOD PRESSURE: 78 MMHG | WEIGHT: 289 LBS | HEIGHT: 72 IN | SYSTOLIC BLOOD PRESSURE: 120 MMHG | TEMPERATURE: 98.2 F | OXYGEN SATURATION: 98 %

## 2022-10-06 DIAGNOSIS — R94.31 ABNORMAL EKG: ICD-10-CM

## 2022-10-06 DIAGNOSIS — Z01.818 PRE-OP EVALUATION: Primary | ICD-10-CM

## 2022-10-06 DIAGNOSIS — Z01.818 PRE-OP EVALUATION: ICD-10-CM

## 2022-10-06 PROCEDURE — 93000 ELECTROCARDIOGRAM COMPLETE: CPT | Performed by: NURSE PRACTITIONER

## 2022-10-06 PROCEDURE — 1123F ACP DISCUSS/DSCN MKR DOCD: CPT | Performed by: NURSE PRACTITIONER

## 2022-10-06 PROCEDURE — 99213 OFFICE O/P EST LOW 20 MIN: CPT | Performed by: NURSE PRACTITIONER

## 2022-10-06 ASSESSMENT — ENCOUNTER SYMPTOMS
VOMITING: 0
DIARRHEA: 0
SORE THROAT: 0
COUGH: 0
SINUS PAIN: 0
NAUSEA: 0
ABDOMINAL PAIN: 0
EYE PAIN: 0
BACK PAIN: 0
SHORTNESS OF BREATH: 0

## 2022-10-06 NOTE — PROGRESS NOTES
[address] [facility]  [address]  [address] [place] [address]  Dept: [phone]  Dept Fax: [phone]    Tanya Jessica is a 67 y.o. male who presents today for his medicalconditions/complaints as noted below. Tanya Jessica is c/o of Pre-op Exam      HPI:     67 y.o male presents for pre op exam, scheduled for urologic procedure soon. EKG completed within office is showing some abnormal changes from previous with ventricular ectopy. Denies any current cardiac symptoms such as chest pain, shortness of breath, headache, dizziness. Type 2 dm, managed with metformin 2000, glimepiride 2, welchol 625 bid, a1c today 6.2     Htn, managed with atenolol 100, amlodipine 5, benzapril 20, hctz 12.5. Blood pressure stable today.       Hyperlipidemia, managed with simvastatin 20, last lipids and liver stable           Past Medical History:   Diagnosis Date    Atherosclerosis of aortic bifurcation and common iliac arteries ([facility] [address] 12/13/2018    H/O cardiac catheterization 2003    Hyperlipidemia     on med    Hypertension     on med    Skin cancer     Type 2 diabetes mellitus (HCC)         Current Outpatient Medications   Medication Sig Dispense Refill    amLODIPine-benazepril (LOTREL) 5-20 MG per capsule TAKE 1 CAPSULE DAILY 90 capsule 3    hydroCHLOROthiazide (HYDRODIURIL) 25 MG tablet Take 1 tablet by mouth every morning 30 tablet 1    colesevelam (WELCHOL) 625 MG tablet TAKE 1 TABLET TWICE A DAY WITH MEALS 180 tablet 3    glimepiride (AMARYL) 2 MG tablet TAKE 1 TABLET EVERY MORNING BEFORE BREAKFAST 90 tablet 3    metFORMIN (GLUCOPHAGE) 1000 MG tablet TAKE 1 TABLET TWICE A DAY WITH MEALS 180 tablet 3    atenolol (TENORMIN) 100 MG tablet TAKE 1 TABLET DAILY 90 tablet 3    simvastatin (ZOCOR) 20 MG tablet TAKE 1 TABLET EVERY EVENING 90 tablet 3    glucose blood VI test strips (GILDARDO CONTOUR NEXT TEST) strip 1 each by In Vitro route 2 times daily DX: DM E11.9 100 each 3    GILDARDO MICROLET LANCETS MISC 1 applicator by Does not apply route 2 times daily DX: DM E11.9 60 each 5    Omega-3 Fatty Acids (FISH OIL) 1200 MG CAPS Take 1,200 mg by mouth daily. tamsulosin (FLOMAX) 0.4 MG capsule take 1 capsule by mouth at bedtime (Patient not taking: Reported on 10/6/2022)      aspirin 81 MG EC tablet Take 81 mg by mouth daily (Patient not taking: Reported on 10/6/2022)       No current facility-administered medications for this visit. No Known Allergies    Subjective:      Review of Systems   Constitutional:  Negative for chills and fatigue. HENT:  Negative for congestion, ear pain, sinus pain and sore throat. Eyes:  Negative for pain and visual disturbance. Respiratory:  Negative for cough and shortness of breath. Cardiovascular:  Negative for chest pain and palpitations. Gastrointestinal:  Negative for abdominal pain, diarrhea, nausea and vomiting. Genitourinary:  Negative for penile pain and testicular pain. Musculoskeletal:  Negative for back pain, joint swelling and neck pain. Skin:  Negative for rash. Neurological:  Negative for dizziness and light-headedness. Hematological:  Does not bruise/bleed easily. All other systems reviewed and are negative.    :Objective     Physical Exam  Vitals and nursing note reviewed. Constitutional:       Appearance: Normal appearance. Cardiovascular:      Rate and Rhythm: Normal rate. Pulmonary:      Effort: Pulmonary effort is normal.      Breath sounds: Normal breath sounds. Skin:     General: Skin is warm and dry. Neurological:      General: No focal deficit present. Mental Status: He is alert and oriented to person, place, and time.      /78 (Site: Right Upper Arm, Position: Sitting, Cuff Size: Medium Adult)   Pulse 60   Temp 98.2 °F (36.8 °C) (Tympanic)   Resp 18   Ht 6' (1.829 m)   Wt 289 lb (131.1 kg)   SpO2 98%   BMI 39.20 kg/m²     Lab Review   Office Visit on 06/17/2022 Component Date Value    Hemoglobin A1C 06/17/2022 6.2        Assessment and Plan      1. Pre-op evaluation  -     EKG 12 Lead - Clinic Performed; Future  -     AFL - Tatiana Langford MD, Cardiology, [place]  2. Abnormal EKG  -     Oswald Cain MD, Cardiology, [place]       EKG abnormal changes from previous  Based on age, htn and lipids recommend cardiac clearance prior to procedure            No results found for this visit on 10/06/22. No follow-ups on file. No orders of the defined types were placed in this encounter. Patient given educational materials - see patient instructions. Discussed use, benefit, and side effects of prescribed medications. All patientquestions answered. Pt voiced understanding. Patient given educational materials - see patient instructions. Discussed use, benefit, and side effects of prescribed medications. All patientquestions answered. Pt voiced understanding. This note was transcribed using dictation with Dragon services. Efforts were made to correct any errors but some words may be misinterpreted.     Patient assumes risks associated with failure to complete recommended testing and treatments in a timely manner    Electronically signed by RILEY Nova CNP on 10/6/2022at 12:47 PM

## 2022-10-06 NOTE — PROGRESS NOTES
Visit Information    Have you changed or started any medications since your last visit including any over-the-counter medicines, vitamins, or herbal medicines? no   Are you having any side effects from any of your medications? -  no  Have you stopped taking any of your medications? Is so, why? -  no    Have you seen any other physician or provider since your last visit? No  Have you had any other diagnostic tests since your last visit? No  Have you been seen in the emergency room and/or had an admission to a hospital since we last saw you? No  Have you had your routine dental cleaning in the past 6 months? yes -     Have you activated your [employer] account? If not, what are your barriers?  Yes     Patient Care Team:  RILEY Little CNP as PCP - General (Family Nurse Practitioner)  RILEY Little CNP as PCP - [facility] Provider    Medical History Review  Past Medical, Family, and Social History reviewed and does contribute to the patient presenting condition    Health Maintenance   Topic Date Due    Shingles vaccine (2 of 3) 12/15/2016    Diabetic foot exam  01/14/2022    DTaP/Tdap/Td vaccine (1 - Tdap) 02/28/2023 (Originally 3/15/1969)    COVID-19 Vaccine (1) 08/11/2025 (Originally 1950)    Colorectal Cancer Screen  11/15/2022    Depression Screen  02/28/2023    Diabetic microalbuminuria test  03/01/2023    Lipids  03/01/2023    Annual Wellness Visit (AWV)  03/01/2023    A1C test (Diabetic or Prediabetic)  06/17/2023    Diabetic retinal exam  07/29/2023    Flu vaccine  Completed    Pneumococcal 65+ years Vaccine  Completed    AAA screen  Completed    Hepatitis C screen  Completed    Hepatitis A vaccine  Aged Out    Hib vaccine  Aged Out    Meningococcal (ACWY) vaccine  Aged Out

## 2022-10-12 ENCOUNTER — OFFICE VISIT (OUTPATIENT)
Dept: CARDIOLOGY CLINIC | Age: 72
End: 2022-10-12
Payer: MEDICARE

## 2022-10-12 VITALS
DIASTOLIC BLOOD PRESSURE: 70 MMHG | BODY MASS INDEX: 39.14 KG/M2 | TEMPERATURE: 98.6 F | HEIGHT: 72 IN | SYSTOLIC BLOOD PRESSURE: 136 MMHG | HEART RATE: 60 BPM | WEIGHT: 289 LBS | RESPIRATION RATE: 20 BRPM | OXYGEN SATURATION: 93 %

## 2022-10-12 DIAGNOSIS — I10 ESSENTIAL HYPERTENSION: ICD-10-CM

## 2022-10-12 DIAGNOSIS — R73.9 HYPERGLYCEMIA: ICD-10-CM

## 2022-10-12 DIAGNOSIS — R94.31 ABNORMAL ECG: ICD-10-CM

## 2022-10-12 DIAGNOSIS — I49.3 FREQUENT PVCS: ICD-10-CM

## 2022-10-12 DIAGNOSIS — E78.2 MIXED HYPERLIPIDEMIA: ICD-10-CM

## 2022-10-12 DIAGNOSIS — R68.89 EXERCISE INTOLERANCE: Primary | ICD-10-CM

## 2022-10-12 PROCEDURE — 1123F ACP DISCUSS/DSCN MKR DOCD: CPT | Performed by: INTERNAL MEDICINE

## 2022-10-12 PROCEDURE — 99204 OFFICE O/P NEW MOD 45 MIN: CPT | Performed by: INTERNAL MEDICINE

## 2022-10-12 NOTE — PROGRESS NOTES
Cardiology Consultation/Follow Up. [address] [facility]  1950  [phone]    Today: 10/12/22    CC: Patient is here for for abnormal ECG and preop for urology surgery. HPI:   Terra Leon is here for for abnormal ECG and preop for urology surgery. Had ECG, showed PVCs. Urology was planning for kidney stone removal, this surgery was delayed due to abnormal ECG. No chest pains, no sob, no palpitations, no le edema, no orthopnea, no PND. States he is pretty active, working in yard, cutting grass. Cant climb 1-2 flights or 1-2 blocks, would have to stop to take a break. Past Medical:  Past Medical History:   Diagnosis Date    Atherosclerosis of aortic bifurcation and common iliac arteries ([facility] [address] 2018    H/O cardiac catheterization     Hyperlipidemia     on med    Hypertension     on med    Skin cancer     Type 2 diabetes mellitus ([facility] [address]        States he has history of cardiac cath many many years ago, never needed stent/revasc.    Past Surgical:  Past Surgical History:   Procedure Laterality Date    CARDIAC CATHETERIZATION  several    COLONOSCOPY      INGUINAL HERNIA REPAIR Right     PENIS SURGERY      SKIN CANCER EXCISION      TIBIA FRACTURE SURGERY Left     UPPER GASTROINTESTINAL ENDOSCOPY           Family History:  Family History   Problem Relation Age of Onset    Kidney Cancer Father     COPD Mother     Other Mother         aneurysm    No Known Problems Sister        Social History:  Social History     Socioeconomic History    Marital status:      Spouse name: Not on file    Number of children: Not on file    Years of education: Not on file    Highest education level: Not on file   Occupational History    Not on file   Tobacco Use    Smoking status: Former     Packs/day: 1.00     Years: 20.00     Pack years: 20.00     Types: Cigarettes     Quit date: 1980     Years since quittin.7    Smokeless tobacco: Never   Substance and Sexual Activity    Alcohol use: No    Drug use: No    Sexual activity: Not on file   Other Topics Concern    Not on file   Social History Narrative    Not on file     Social Determinants of Health     Financial Resource Strain: Low Risk     Difficulty of Paying Living Expenses: Not hard at all   Food Insecurity: No Food Insecurity    Worried About Running Out of Food in the Last Year: Never true    Ran Out of Food in the Last Year: Never true   Transportation Needs: Not on file   Physical Activity: Inactive    Days of Exercise per Week: 0 days    Minutes of Exercise per Session: 10 min   Stress: Not on file   Social Connections: Not on file   Intimate Partner Violence: Not on file   Housing Stability: Not on file        REVIEW OF SYSTEMS:    Constitutional: there has been no unanticipated weight loss. There's been No change in energy level, No change in activity level. Eyes: No visual changes or diplopia. No scleral icterus. ENT: No Headaches, hearing loss or vertigo. No mouth sores or sore throat. Cardiovascular: AS HPI  Respiratory: AS HPI  Gastrointestinal: No abdominal pain, appetite loss, blood in stools. No change in bowel or bladder habits. Genitourinary: No dysuria, trouble voiding, or hematuria. Musculoskeletal:  No gait disturbance, No weakness or joint complaints. Integumentary: No rash or pruritis. Neurological: No headache, diplopia, change in muscle strength, numbness or tingling. No change in gait, balance, coordination, mood, affect, memory, mentation, behavior. Psychiatric: No new anxiety or depression. Endocrine: No temperature intolerance. No excessive thirst, fluid intake, or urination. No tremor. Hematologic/Lymphatic: No abnormal bruising or bleeding, blood clots or swollen lymph nodes. Allergic/Immunologic: No nasal congestion or hives.     Medications:    Current Outpatient Medications:     amLODIPine-benazepril (LOTREL) 5-20 MG per capsule, TAKE 1 CAPSULE DAILY, Disp: 90 capsule, Rfl: 3    tamsulosin (FLOMAX) 0.4 MG capsule, , Disp: , Rfl:     hydroCHLOROthiazide (HYDRODIURIL) 25 MG tablet, Take 1 tablet by mouth every morning, Disp: 30 tablet, Rfl: 1    colesevelam (WELCHOL) 625 MG tablet, TAKE 1 TABLET TWICE A DAY WITH MEALS, Disp: 180 tablet, Rfl: 3    glimepiride (AMARYL) 2 MG tablet, TAKE 1 TABLET EVERY MORNING BEFORE BREAKFAST, Disp: 90 tablet, Rfl: 3    metFORMIN (GLUCOPHAGE) 1000 MG tablet, TAKE 1 TABLET TWICE A DAY WITH MEALS, Disp: 180 tablet, Rfl: 3    atenolol (TENORMIN) 100 MG tablet, TAKE 1 TABLET DAILY, Disp: 90 tablet, Rfl: 3    simvastatin (ZOCOR) 20 MG tablet, TAKE 1 TABLET EVERY EVENING, Disp: 90 tablet, Rfl: 3    aspirin 81 MG EC tablet, Take 81 mg by mouth daily, Disp: , Rfl:     glucose blood VI test strips (GILDARDO CONTOUR NEXT TEST) strip, 1 each by In Vitro route 2 times daily DX: DM E11.9, Disp: 100 each, Rfl: 3    GILDARDO MICROLET LANCETS MISC, 1 applicator by Does not apply route 2 times daily DX: DM E11.9, Disp: 60 each, Rfl: 5    Omega-3 Fatty Acids (FISH OIL) 1200 MG CAPS, Take 1,200 mg by mouth daily. , Disp: , Rfl:      Physical Exam:   Vitals: /70 (Site: Right Upper Arm, Position: Sitting, Cuff Size: Medium Adult)   Pulse 60   Temp 98.6 °F (37 °C) (Tympanic)   Resp 20   Ht 6' (1.829 m)   Wt 289 lb (131.1 kg)   SpO2 93%   BMI 39.20 kg/m²   General appearance: alert and cooperative with exam  HEENT: Head: Normocephalic, no lesions, without obvious abnormality. Neck: no carotid bruit, no JVD  Lungs: clear to auscultation bilaterally  Heart:  regular rate and rhythm, S1, S2 normal, no Murmur  Abdomen: soft, non-tender; bowel sounds normal; no masses,  no organomegaly  Extremities: no site injection hematoma, extremities normal, atraumatic, no cyanosis.  no edema  Neurologic: Mental status: Alert, oriented, thought content appropriate    Labs:  Lab Results   Component Value Date    CHOL 120 03/01/2022    TRIG 154 (H) 03/01/2022    HDL 33 (L) 03/01/2022 LDLCHOLESTEROL 56 03/01/2022    LDLCALC 92 10/12/2016    VLDL NOT REPORTED 05/19/2021    CHOLHDLRATIO 3.6 03/01/2022       Lab Results   Component Value Date     03/01/2022    K 4.3 03/01/2022     03/01/2022    CO2 20 03/01/2022    BUN 19 03/01/2022    CREATININE 0.83 03/01/2022    GLUCOSE 169 (H) 03/01/2022    CALCIUM 9.5 03/01/2022    PROT 7.0 03/01/2022    LABALBU 4.3 03/01/2022    BILITOT 0.89 03/01/2022    ALKPHOS 57 03/01/2022    AST 31 03/01/2022    ALT 32 03/01/2022    LABGLOM >60 03/01/2022    GFRAA >60 03/01/2022    GLOB NOT REPORTED 03/16/2019       EKG: Sinus  Bradycardia  - frequent multiform ectopic ventricular beats   # VECs = 2, # types 2  -  Nonspecific T-abnormality. Past Medical and Surgical History, Problem List, Allergies, Medications, Labs, Imaging, all reviewed extensively in EMR and with the patient. Assessment:  - Preop risk for urology surgery  - Frequent PVC  - HTN  - DL  - DM2  - Abnormal ECG  - Exercise intolerance    Plan:  - Cannot climb 1-2 flights of stairs and walk 1-2 blocks  - has frequent PVC  - will check Lexiscan stress test to rule out ischemia/further risk stratify   - if stress test low risk, can proceed at intermediate risk, can hold ASA 5 days prior. The patient is to continue heart healthy diet, weight loss and exercise as tolerated. Patient's medications and side effects were discussed. Medication refills were provided if needed. Follow up appointment timing was discussed. All questions and concerns were addressed to patient's satisfaction. The patient is to follow up in 6 months or sooner if necessary. Thank you for allowing me to participate in the care of this patient, please do not hesitate to call if you have any questions. Ilsa Diop DO, [facility] [place], [address], [address] [facility]  [facility]. [facility]  [phone]

## 2022-10-17 LAB
CONTROL: NORMAL
CONTROL: NORMAL
HEMOCCULT STL QL: NEGATIVE
HEMOCCULT STL QL: NEGATIVE

## 2022-10-19 ENCOUNTER — HOSPITAL ENCOUNTER (OUTPATIENT)
Dept: NUCLEAR MEDICINE | Age: 72
Discharge: HOME | End: 2022-10-21
Payer: MEDICARE

## 2022-10-19 ENCOUNTER — HOSPITAL ENCOUNTER (OUTPATIENT)
Dept: NON INVASIVE DIAGNOSTICS | Age: 72
Discharge: HOME | End: 2022-10-19
Payer: MEDICARE

## 2022-10-19 ENCOUNTER — TELEPHONE (OUTPATIENT)
Dept: CARDIOLOGY | Age: 72
End: 2022-10-19

## 2022-10-19 DIAGNOSIS — I49.3 FREQUENT PVCS: ICD-10-CM

## 2022-10-19 DIAGNOSIS — R73.9 HYPERGLYCEMIA: ICD-10-CM

## 2022-10-19 DIAGNOSIS — E78.2 MIXED HYPERLIPIDEMIA: ICD-10-CM

## 2022-10-19 DIAGNOSIS — R68.89 EXERCISE INTOLERANCE: ICD-10-CM

## 2022-10-19 DIAGNOSIS — R94.31 ABNORMAL ECG: ICD-10-CM

## 2022-10-19 DIAGNOSIS — I10 ESSENTIAL HYPERTENSION: ICD-10-CM

## 2022-10-19 DIAGNOSIS — R94.39 ABNORMAL CARDIOVASCULAR STRESS TEST: Primary | ICD-10-CM

## 2022-10-19 PROCEDURE — 2580000003 HC RX 258: Performed by: INTERNAL MEDICINE

## 2022-10-19 PROCEDURE — 78452 HT MUSCLE IMAGE SPECT MULT: CPT

## 2022-10-19 PROCEDURE — 93017 CV STRESS TEST TRACING ONLY: CPT | Performed by: NURSE PRACTITIONER

## 2022-10-19 PROCEDURE — 6360000002 HC RX W HCPCS: Performed by: INTERNAL MEDICINE

## 2022-10-19 PROCEDURE — A9500 TC99M SESTAMIBI: HCPCS | Performed by: INTERNAL MEDICINE

## 2022-10-19 PROCEDURE — 3430000000 HC RX DIAGNOSTIC RADIOPHARMACEUTICAL: Performed by: INTERNAL MEDICINE

## 2022-10-19 RX ORDER — SODIUM CHLORIDE 9 MG/ML
500 INJECTION, SOLUTION INTRAVENOUS CONTINUOUS PRN
Status: DISCONTINUED | OUTPATIENT
Start: 2022-10-19 | End: 2022-10-19 | Stop reason: HOSPADM

## 2022-10-19 RX ORDER — ATROPINE SULFATE 0.1 MG/ML
0.5 INJECTION INTRAVENOUS EVERY 5 MIN PRN
Status: DISCONTINUED | OUTPATIENT
Start: 2022-10-19 | End: 2022-10-19 | Stop reason: HOSPADM

## 2022-10-19 RX ORDER — SODIUM CHLORIDE 0.9 % (FLUSH) 0.9 %
10 SYRINGE (ML) INJECTION PRN
Status: DISCONTINUED | OUTPATIENT
Start: 2022-10-19 | End: 2022-10-22 | Stop reason: HOSPADM

## 2022-10-19 RX ORDER — ALBUTEROL SULFATE 90 UG/1
2 AEROSOL, METERED RESPIRATORY (INHALATION) PRN
Status: DISCONTINUED | OUTPATIENT
Start: 2022-10-19 | End: 2022-10-19 | Stop reason: HOSPADM

## 2022-10-19 RX ORDER — AMINOPHYLLINE DIHYDRATE 25 MG/ML
50 INJECTION, SOLUTION INTRAVENOUS PRN
Status: DISCONTINUED | OUTPATIENT
Start: 2022-10-19 | End: 2022-10-19 | Stop reason: HOSPADM

## 2022-10-19 RX ORDER — METOPROLOL TARTRATE 5 MG/5ML
5 INJECTION INTRAVENOUS EVERY 5 MIN PRN
Status: DISCONTINUED | OUTPATIENT
Start: 2022-10-19 | End: 2022-10-19 | Stop reason: HOSPADM

## 2022-10-19 RX ORDER — NITROGLYCERIN 0.4 MG/1
0.4 TABLET SUBLINGUAL EVERY 5 MIN PRN
Status: DISCONTINUED | OUTPATIENT
Start: 2022-10-19 | End: 2022-10-19 | Stop reason: HOSPADM

## 2022-10-19 RX ORDER — SODIUM CHLORIDE 0.9 % (FLUSH) 0.9 %
5-40 SYRINGE (ML) INJECTION PRN
Status: DISCONTINUED | OUTPATIENT
Start: 2022-10-19 | End: 2022-10-19 | Stop reason: HOSPADM

## 2022-10-19 RX ADMIN — TETRAKIS(2-METHOXYISOBUTYLISOCYANIDE)COPPER(I) TETRAFLUOROBORATE 40.9 MILLICURIE: 1 INJECTION, POWDER, LYOPHILIZED, FOR SOLUTION INTRAVENOUS at 09:44

## 2022-10-19 RX ADMIN — TETRAKIS(2-METHOXYISOBUTYLISOCYANIDE)COPPER(I) TETRAFLUOROBORATE 16 MILLICURIE: 1 INJECTION, POWDER, LYOPHILIZED, FOR SOLUTION INTRAVENOUS at 08:00

## 2022-10-19 RX ADMIN — SODIUM CHLORIDE, PRESERVATIVE FREE 10 ML: 5 INJECTION INTRAVENOUS at 09:13

## 2022-10-19 RX ADMIN — SODIUM CHLORIDE, PRESERVATIVE FREE 10 ML: 5 INJECTION INTRAVENOUS at 09:44

## 2022-10-19 RX ADMIN — SODIUM CHLORIDE, PRESERVATIVE FREE 10 ML: 5 INJECTION INTRAVENOUS at 08:00

## 2022-10-19 RX ADMIN — REGADENOSON 0.4 MG: 0.08 INJECTION, SOLUTION INTRAVENOUS at 09:43

## 2022-10-19 NOTE — TELEPHONE ENCOUNTER
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 I think would benefit from cardiac cath prior to surgery. Please let me know if patient agrees so I can order cath.

## 2022-10-19 NOTE — PROCEDURES
[address]                  [facility]                              CARDIAC STRESS TEST    PATIENT NAME: Jessica Keane                  :        1950  MED REC NO:   [id number]                             ROOM:  ACCOUNT NO:   [de-identified]                           ADMIT DATE: 10/19/2022  PROVIDER:     Hoda Deleon    DATE OF STUDY:  10/19/2022    ORDERING PROVIDER: Dr. Cooper Castro    PRIMARY CARE PROVIDER: Angel Dorsey CNP    INTERPRETING PHYSICIAN: Dr. Shahida Suárez:    Indication: EKG Abnormalities    Procedure explained and consent signed. Medications: Lexiscan, 0.4 mg  Resting Heart rate: 59 bpm  Resting blood pressure:  135/77 mm/Hg  Infusion heart rate:  83 bpm  Infusion blood pressure:  135/77 mm/Hg  Resting EKG: Sinus bradycardia, otherwise Normal  Stress heart response: Normal Response  Stress BP response: Appropriate  Stress EKG(S): Normal  Chest discomfort: None  Ischemic EKG changes: None    IMPRESSION:  Electrocardiographically Negative Lexiscan stress study. Radio-isotope  results to follow from the department of Nuclear Medicine.              [facility]    D: 10/19/2022 14:32:01       T: 10/19/2022 14:34:22     /[id number]  Job#: [id number]     Doc#: Unknown    CC:

## 2022-10-20 NOTE — TELEPHONE ENCOUNTER
Cath ordered  Please fax his information to [place] in our [place] office, she will manage the cath. Call our [place] office if having any trouble.

## 2022-10-25 RX ORDER — SODIUM CHLORIDE 9 MG/ML
INJECTION, SOLUTION INTRAVENOUS CONTINUOUS
OUTPATIENT
Start: 2022-10-26

## 2022-10-25 RX ORDER — ASPIRIN 81 MG/1
81 TABLET, CHEWABLE ORAL ONCE
OUTPATIENT
Start: 2022-10-26

## 2022-10-26 ENCOUNTER — OFFICE VISIT (OUTPATIENT)
Dept: FAMILY MEDICINE CLINIC | Age: 72
End: 2022-10-26
Payer: MEDICARE

## 2022-10-26 ENCOUNTER — HOSPITAL ENCOUNTER (OUTPATIENT)
Dept: CARDIAC CATH/INVASIVE PROCEDURES | Age: 72
Discharge: HOME | End: 2022-10-26

## 2022-10-26 VITALS
SYSTOLIC BLOOD PRESSURE: 138 MMHG | HEART RATE: 66 BPM | TEMPERATURE: 96.9 F | DIASTOLIC BLOOD PRESSURE: 86 MMHG | HEIGHT: 72 IN | OXYGEN SATURATION: 97 % | WEIGHT: 286 LBS | BODY MASS INDEX: 38.74 KG/M2

## 2022-10-26 DIAGNOSIS — N20.0 KIDNEY STONE: ICD-10-CM

## 2022-10-26 DIAGNOSIS — R94.39 ABNORMAL STRESS TEST: ICD-10-CM

## 2022-10-26 DIAGNOSIS — E11.9 TYPE 2 DIABETES MELLITUS WITHOUT COMPLICATION, WITHOUT LONG-TERM CURRENT USE OF INSULIN (HCC): Primary | ICD-10-CM

## 2022-10-26 LAB — HBA1C MFR BLD: 6.2 %

## 2022-10-26 PROCEDURE — 1123F ACP DISCUSS/DSCN MKR DOCD: CPT | Performed by: NURSE PRACTITIONER

## 2022-10-26 PROCEDURE — 3074F SYST BP LT 130 MM HG: CPT | Performed by: NURSE PRACTITIONER

## 2022-10-26 PROCEDURE — 3078F DIAST BP <80 MM HG: CPT | Performed by: NURSE PRACTITIONER

## 2022-10-26 PROCEDURE — 99214 OFFICE O/P EST MOD 30 MIN: CPT | Performed by: NURSE PRACTITIONER

## 2022-10-26 PROCEDURE — 83036 HEMOGLOBIN GLYCOSYLATED A1C: CPT | Performed by: NURSE PRACTITIONER

## 2022-10-26 PROCEDURE — 3044F HG A1C LEVEL LT 7.0%: CPT | Performed by: NURSE PRACTITIONER

## 2022-10-26 ASSESSMENT — ENCOUNTER SYMPTOMS
ABDOMINAL PAIN: 0
CHEST TIGHTNESS: 0
VOMITING: 0
COUGH: 0
NAUSEA: 0
SHORTNESS OF BREATH: 0

## 2022-10-26 NOTE — PROGRESS NOTES
[facility]  [address]  [place], [address]  [phone]      Valdemar Lozoya is a 67 y.o. male who presents today for his  medicalconditions/complaints as noted below. Valdemar Lozoya is c/o of Diabetes and Nephrolithiasis (Dx'd with kidney stone that couldn't be passed, sent to [place] [address] as the urologist he was seeing was retiring. Had stress test, told them he had irregular heart beat, ekg was fine. Told he needed stent and was supposed to have done today but was told ins cannot go to [place]. Needs referral for in network provider in [place] (needs urologist with surgical background))  . HPI:    Diabetes  He presents for his follow-up diabetic visit. He has type 2 diabetes mellitus. His disease course has been stable. There are no hypoglycemic associated symptoms. Pertinent negatives for hypoglycemia include no dizziness, headaches or nervousness/anxiousness. There are no diabetic associated symptoms. Pertinent negatives for diabetes include no chest pain, no fatigue, no polydipsia, no polyphagia, no polyuria and no weakness. There are no hypoglycemic complications. Diabetic complications include heart disease. Pertinent negatives for diabetic complications include no peripheral neuropathy, PVD or retinopathy. Risk factors for coronary artery disease include diabetes mellitus, male sex, hypertension and sedentary lifestyle. Current diabetic treatment includes oral agent (dual therapy) and diet. He is compliant with treatment all of the time. He is following a diabetic diet. Meal planning includes avoidance of concentrated sweets. He has not had a previous visit with a dietitian. He rarely participates in exercise. An ACE inhibitor/angiotensin II receptor blocker is being taken.    Nephrolithiasis    Has large kidney stone and needs new covered provider for insurance for urology and also needs heart cath and needs new cardio referral with [facility], had recent stress test  Past Medical History:   Diagnosis Date    Atherosclerosis of aortic bifurcation and common iliac arteries ([facility] 2018    H/O cardiac catheterization 2003    Hyperlipidemia     on med    Hypertension     on med    Skin cancer     Type 2 diabetes mellitus ([facility]       Past Surgical History:   Procedure Laterality Date    CARDIAC CATHETERIZATION  several    COLONOSCOPY      INGUINAL HERNIA REPAIR Right     PENIS SURGERY      SKIN CANCER EXCISION      TIBIA FRACTURE SURGERY Left     UPPER GASTROINTESTINAL ENDOSCOPY       Family History   Problem Relation Age of Onset    Kidney Cancer Father     COPD Mother     Other Mother         aneurysm    No Known Problems Sister      Social History     Tobacco Use    Smoking status: Former     Packs/day: 1.00     Years: 20.00     Pack years: 20.00     Types: Cigarettes     Quit date: 1980     Years since quittin.7    Smokeless tobacco: Never   Substance Use Topics    Alcohol use: No      Current Outpatient Medications   Medication Sig Dispense Refill    amLODIPine-benazepril (LOTREL) 5-20 MG per capsule TAKE 1 CAPSULE DAILY 90 capsule 3    tamsulosin (FLOMAX) 0.4 MG capsule Take 0.4 mg by mouth daily      hydroCHLOROthiazide (HYDRODIURIL) 25 MG tablet Take 1 tablet by mouth every morning 30 tablet 1    colesevelam (WELCHOL) 625 MG tablet TAKE 1 TABLET TWICE A DAY WITH MEALS 180 tablet 3    glimepiride (AMARYL) 2 MG tablet TAKE 1 TABLET EVERY MORNING BEFORE BREAKFAST 90 tablet 3    metFORMIN (GLUCOPHAGE) 1000 MG tablet TAKE 1 TABLET TWICE A DAY WITH MEALS 180 tablet 3    atenolol (TENORMIN) 100 MG tablet TAKE 1 TABLET DAILY 90 tablet 3    simvastatin (ZOCOR) 20 MG tablet TAKE 1 TABLET EVERY EVENING 90 tablet 3    aspirin 81 MG EC tablet Take 81 mg by mouth daily      Omega-3 Fatty Acids (FISH OIL) 1200 MG CAPS Take 1,200 mg by mouth daily.       glucose blood VI test strips (GILDARDO CONTOUR NEXT TEST) strip 1 each by In Vitro route 2 times daily DX: DM E11.9 100 each 3 [employer] MICROLET LANCETS MISC 1 applicator by Does not apply route 2 times daily DX: DM E11.9 60 each 5     No current facility-administered medications for this visit. No Known Allergies    Health Maintenance   Topic Date Due    Shingles vaccine (2 of 3) 12/15/2016    Diabetic foot exam  01/14/2022    Colorectal Cancer Screen  11/15/2022    DTaP/Tdap/Td vaccine (1 - Tdap) 02/28/2023 (Originally 3/15/1969)    COVID-19 Vaccine (1) 08/11/2025 (Originally 1950)    Depression Screen  02/28/2023    Diabetic microalbuminuria test  03/01/2023    Lipids  03/01/2023    Annual Wellness Visit (AWV)  03/01/2023    Diabetic retinal exam  07/29/2023    A1C test (Diabetic or Prediabetic)  10/26/2023    Flu vaccine  Completed    Pneumococcal 65+ years Vaccine  Completed    AAA screen  Completed    Hepatitis C screen  Completed    Hepatitis A vaccine  Aged Out    Hib vaccine  Aged Out    Meningococcal (ACWY) vaccine  Aged Out       Subjective:      Review of Systems   Constitutional:  Negative for appetite change, chills, diaphoresis, fatigue and fever. Eyes:  Negative for visual disturbance. Respiratory:  Negative for cough, chest tightness and shortness of breath. Cardiovascular:  Negative for chest pain, palpitations and leg swelling. Gastrointestinal:  Negative for abdominal pain, nausea and vomiting. Endocrine: Negative for polydipsia, polyphagia and polyuria. Genitourinary:  Negative for difficulty urinating, dysuria, frequency, genital sores, hematuria and urgency. Skin:  Negative for rash. Neurological:  Negative for dizziness, weakness, numbness and headaches. Hematological:  Negative for adenopathy. Psychiatric/Behavioral:  Negative for dysphoric mood and sleep disturbance. The patient is not nervous/anxious. Objective:      Physical Exam  Vitals and nursing note reviewed. Constitutional:       General: He is not in acute distress. Appearance: Normal appearance.  He is well-developed. He is obese. He is not ill-appearing or diaphoretic. HENT:      Head: Normocephalic and atraumatic. Eyes:      Conjunctiva/sclera: Conjunctivae normal.   Cardiovascular:      Rate and Rhythm: Normal rate and regular rhythm. Pulses: Normal pulses. Heart sounds: Normal heart sounds. No murmur heard. Comments: No LE edema  Pulmonary:      Effort: Pulmonary effort is normal.      Breath sounds: Normal breath sounds. Musculoskeletal:      Cervical back: Neck supple. Skin:     General: Skin is warm and dry. Neurological:      General: No focal deficit present. Mental Status: He is alert and oriented to person, place, and time. Mental status is at baseline. Psychiatric:         Mood and Affect: Mood normal.         Behavior: Behavior normal.         Thought Content: Thought content normal.         Judgment: Judgment normal.       Assessment:       Diagnosis Orders   1. Type 2 diabetes mellitus without complication, without long-term current use of insulin ([facility])  POCT glycosylated hemoglobin (Hb A1C)      2. Kidney stone  Amb External Referral To Urology      3. Abnormal stress test  External Referral To Cardiology        Narrative   EXAMINATION:   MYOCARDIAL PERFUSION IMAGING       10/19/2022 11:48 am       TECHNIQUE:   For the rest study, 16 mCi of Tc-99m labeled sestamibi were injected. SPECT   images were acquired. Under cardiology supervision, 0.4 mg Lexiscan was infused. After   pharmacologic stress, 40.9 mCi of Tc-99m labeled sestamibi were injected. SPECT images with ECG gating were acquired. COMPARISON:   None Available.        HISTORY:   ORDERING SYSTEM PROVIDED HISTORY: Exercise intolerance   TECHNOLOGIST PROVIDED HISTORY:   Reason for Exam: Other   Reason for Exam: EKG abnormalities   Reason for Exam: Pre-op   Reason for Exam: abnormal EKG, PRE-OP, short of breath, HTN, sweating, DM       FINDINGS:   Moderate to severe fixed basal inferior wall perfusion defect small mild   reversible mid inferior perfusion defect. Defect side exaggerated on prone   imaging. Gated images show decreased myocardial thickening in the basal   inferior wall. Combination of findings suggestive of basal inferior infarct   and mild mid inferior wall ischemia. Perfusion defect 1%. Left ventricular chamber dilatation. Global hypokinesis and decreased   myocardial thickening in the inferior wall. Perfusion scores are visually adjusted to account for artifact. Summed stress score:  1       Summed rest score:  0       Summed reversibility score:  1       Function:       End diastolic volume:  858ZA       Left ventricular ejection fraction:  40%       TID score:  1.14 (scores greater than 1.39 are considered elevated for   Lexiscan stress with Tc99m)       Notes concerning risk stratification:       Risk stratification incorporates both clinical history and some testing   results. Final risk determination is the responsibility of the ordering   provider as other patient information and test results may increase or   decrease the risk assessment reported for this examination. Risk stratification criteria are adapted from \"Noninvasive Risk   Stratification\" criteria from [facility]. Al,   ACC/AATS/AHA/ASE/ASNC/SCAI/SCCT/STS 2017 Appropriate Use Criteria For   Coronary Revascularization in Patients With Stable Ischemic Heart Disease   [facility] Volume 69, Issue 17, May 2017       High risk (>3% annual death or MI)       1. Severe resting LV dysfunction (LVEF <35%) not readily explained by non   coronary causes       2. Resting perfusion abnormalities greater than 10% of the myocardium in   patients without prior history or evidence of MI       3. Stress-induced perfusion abnormalities encumbering greater than or equal   to 10% myocardium or stress segmental scores indicating multiple vascular   territories with abnormalities 4.  Stress-induced LV dilatation (TID ratio   greater than 1.19 for exercise and greater than 1.39 for regadenoson)       Intermediate risk (1% to 3% annual death or MI)       1. Mild/moderate resting LV dysfunction (LVEF 35% to 49%) not readily   explained by non coronary causes. 2. Resting perfusion abnormalities in 5%-9.9% of the myocardium in patients   without a history or prior evidence of MI       3. Stress-induced perfusion abnormality encumbering 5%-9.9% of the myocardium   or stress segmental scores indicating 1 vascular territory with abnormalities   but without LV dilation 4. Small wall motion abnormality involving 1-2   segments and only 1 coronary bed. Low Risk (Less than 1% annual death or MI)       1. Normal or small myocardial perfusion defect at rest or with stress   encumbering less than 5% of the myocardium. Impression       Fixed moderate severity basal inferior wall with decreased myocardial   thickening consistent with infarct. Small mild reversible mid inferior wall perfusion defect consistent with   ischemia. LVEF 40%       Risk stratification: Intermediate risk. The findings were sent to the Radiology Results [address] at 3:52   pm on 10/19/2022 to be communicated to a licensed caregiver. Results for orders placed or performed in visit on 10/26/22   POCT glycosylated hemoglobin (Hb A1C)   Result Value Ref Range    Hemoglobin A1C 6.2 %       Wt Readings from Last 3 Encounters:   10/26/22 286 lb (129.7 kg)   10/12/22 289 lb (131.1 kg)   10/06/22 289 lb (131.1 kg)     Reviewed all other recent labs  Plan:      No follow-ups on file.   Orders Placed This Encounter   Procedures    Amb External Referral To Urology     Referral Priority:   Routine     Referral Type:   Consult for Advice and Opinion     Referral Reason:   Specialty Services Required     Referred to Provider:   Gregorio Addison MD     Requested Specialty:   Urology     Number of Visits Requested:   1    External Referral To Cardiology     Referral Priority:   Routine     Referral Type:   Eval and Treat     Referral Reason:   Specialty Services Required     Referred to Provider:   Birgit Rodgers MD     Requested Specialty:   Cardiology     Number of Visits Requested:   1    POCT glycosylated hemoglobin (Hb A1C)     New referral for cardio and urology that are in network  Call for an appt    Dm:  Remains prediabetic  Low carb, low sugar, high fiber, protein diet  Exercise as tolerated  Continue same meds for now        Patient given educational materials - see patient instructions. Discussed use,benefit, and side effects of prescribed medications. All patient questions answered. Pt voiced understanding. Reviewed health maintenance. Instructed to continue currentmedications, diet and exercise.     Electronically signed by RILEY Martino CNP, CNP on 10/26/2022 at 2:56 PM

## 2022-10-26 NOTE — PROGRESS NOTES
Visit Information    Have you changed or started any medications since your last visit including any over-the-counter medicines, vitamins, or herbal medicines? no   Have you stopped taking any of your medications? Is so, why? -  no  Are you having any side effects from any of your medications? - no    Have you seen any other physician or provider since your last visit?  no   Have you had any other diagnostic tests since your last visit?  no   Have you been seen in the emergency room and/or had an admission in a hospital since we last saw you?  no   Have you had your routine dental cleaning in the past 6 months?  no     Do you have an active MyChart account? If no, what is the barrier?   Yes    Patient Care Team:  RILEY Morales CNP as PCP - General (Family Nurse Practitioner)  RILEY Morales CNP as PCP - [facility] Provider    Medical History Review  Past Medical, Family, and Social History reviewed and  contribute to the patient presenting condition    Health Maintenance   Topic Date Due    Shingles vaccine (2 of 3) 12/15/2016    Diabetic foot exam  01/14/2022    Colorectal Cancer Screen  11/15/2022    DTaP/Tdap/Td vaccine (1 - Tdap) 02/28/2023 (Originally 3/15/1969)    COVID-19 Vaccine (1) 08/11/2025 (Originally 1950)    Depression Screen  02/28/2023    Diabetic microalbuminuria test  03/01/2023    Lipids  03/01/2023    Annual Wellness Visit (AWV)  03/01/2023    A1C test (Diabetic or Prediabetic)  06/17/2023    Diabetic retinal exam  07/29/2023    Flu vaccine  Completed    Pneumococcal 65+ years Vaccine  Completed    AAA screen  Completed    Hepatitis C screen  Completed    Hepatitis A vaccine  Aged Out    Hib vaccine  Aged Out    Meningococcal (ACWY) vaccine  Aged Out

## 2022-11-02 ENCOUNTER — TELEPHONE (OUTPATIENT)
Dept: FAMILY MEDICINE CLINIC | Age: 72
End: 2022-11-02

## 2022-11-02 DIAGNOSIS — Z01.818 PRE-OP EVALUATION: ICD-10-CM

## 2022-11-02 DIAGNOSIS — R94.39 ABNORMAL STRESS TEST: Primary | ICD-10-CM

## 2022-11-02 DIAGNOSIS — R94.31 ABNORMAL EKG: ICD-10-CM

## 2022-11-02 NOTE — TELEPHONE ENCOUNTER
----- Message from [facility] sent at 11/2/2022 10:44 AM EDT -----  Subject: Message to Provider    QUESTIONS  Information for Provider? Pt advised that PCP gave him a referral, but he   would like to see a different specialist, he would like a referral to DR. Guthrie. Ph:[phone] Fax://[phone]. Pt was told he could get   an appointment as soon as next week if PCP could get the referral to that   office ASAP that would be appreciated.  ---------------------------------------------------------------------------  --------------  [id number]  [phone]; OK to leave message on voicemail  ---------------------------------------------------------------------------  --------------  SCRIPT ANSWERS  Relationship to Patient?  Self

## 2022-11-02 NOTE — TELEPHONE ENCOUNTER
Patient came in stating that he needs his cardio referral switched to:    Elizabeth Dang MD  [facility]  [address]  [facility] [place], [address]  P: [phone]  F: [phone]    Please advise.

## 2022-11-09 RX ORDER — SIMVASTATIN 20 MG
TABLET ORAL
Qty: 90 TABLET | Refills: 3 | Status: SHIPPED | OUTPATIENT
Start: 2022-11-09

## 2022-11-14 RX ORDER — HYDROCHLOROTHIAZIDE 12.5 MG/1
CAPSULE, GELATIN COATED ORAL
Qty: 90 CAPSULE | Refills: 3 | OUTPATIENT
Start: 2022-11-14

## 2023-01-11 RX ORDER — ATENOLOL 100 MG/1
TABLET ORAL
Qty: 90 TABLET | Refills: 3 | Status: SHIPPED | OUTPATIENT
Start: 2023-01-11

## 2023-01-16 DIAGNOSIS — I10 ESSENTIAL HYPERTENSION: ICD-10-CM

## 2023-01-16 RX ORDER — HYDROCHLOROTHIAZIDE 25 MG/1
25 TABLET ORAL EVERY MORNING
Qty: 90 TABLET | Refills: 1 | Status: ON HOLD | OUTPATIENT
Start: 2023-01-16 | End: 2023-01-30 | Stop reason: HOSPADM

## 2023-01-27 ENCOUNTER — HOSPITAL ENCOUNTER (INPATIENT)
Age: 73
LOS: 3 days | Discharge: HOME | DRG: 638 | End: 2023-01-30
Attending: EMERGENCY MEDICINE | Admitting: INTERNAL MEDICINE
Payer: MEDICARE

## 2023-01-27 DIAGNOSIS — L03.115 CELLULITIS OF RIGHT LOWER EXTREMITY: Primary | ICD-10-CM

## 2023-01-27 DIAGNOSIS — S81.801A OPEN WOUND OF RIGHT LOWER LEG, INITIAL ENCOUNTER: ICD-10-CM

## 2023-01-27 DIAGNOSIS — N17.9 AKI (ACUTE KIDNEY INJURY) (HCC): ICD-10-CM

## 2023-01-27 PROBLEM — L03.90 CELLULITIS: Status: ACTIVE | Noted: 2023-01-27

## 2023-01-27 LAB
ABSOLUTE EOS #: <0.03 K/UL (ref 0–0.44)
ABSOLUTE IMMATURE GRANULOCYTE: 0.15 K/UL (ref 0–0.3)
ABSOLUTE LYMPH #: 1.16 K/UL (ref 1.1–3.7)
ABSOLUTE MONO #: 1 K/UL (ref 0.1–1.2)
ANION GAP SERPL CALCULATED.3IONS-SCNC: 13 MMOL/L (ref 9–17)
BASOPHILS # BLD: 0 % (ref 0–2)
BASOPHILS ABSOLUTE: 0.07 K/UL (ref 0–0.2)
BUN BLDV-MCNC: 27 MG/DL (ref 8–23)
BUN/CREAT BLD: 21 (ref 9–20)
C-REACTIVE PROTEIN: 256.3 MG/L (ref 0–5)
CALCIUM SERPL-MCNC: 9.2 MG/DL (ref 8.6–10.4)
CHLORIDE BLD-SCNC: 98 MMOL/L (ref 98–107)
CO2: 22 MMOL/L (ref 20–31)
CREAT SERPL-MCNC: 1.26 MG/DL (ref 0.7–1.2)
D-DIMER QUANTITATIVE: 1.11 MG/L FEU (ref 0–0.59)
EOSINOPHILS RELATIVE PERCENT: 0 % (ref 1–4)
GFR SERPL CREATININE-BSD FRML MDRD: >60 ML/MIN/1.73M2
GLUCOSE BLD-MCNC: 180 MG/DL (ref 75–110)
GLUCOSE BLD-MCNC: 205 MG/DL (ref 70–99)
HCT VFR BLD CALC: 41.8 % (ref 40.7–50.3)
HEMOGLOBIN: 13.9 G/DL (ref 13–17)
IMMATURE GRANULOCYTES: 1 %
LACTIC ACID, SEPSIS: 1.8 MMOL/L (ref 0.5–1.9)
LACTIC ACID, SEPSIS: 2.5 MMOL/L (ref 0.5–1.9)
LYMPHOCYTES # BLD: 6 % (ref 24–43)
MCH RBC QN AUTO: 31.8 PG (ref 25.2–33.5)
MCHC RBC AUTO-ENTMCNC: 33.3 G/DL (ref 28.4–34.8)
MCV RBC AUTO: 95.7 FL (ref 82.6–102.9)
MONOCYTES # BLD: 5 % (ref 3–12)
NRBC AUTOMATED: 0 PER 100 WBC
PDW BLD-RTO: 13.8 % (ref 11.8–14.4)
PLATELET # BLD: 175 K/UL (ref 138–453)
PMV BLD AUTO: 11.5 FL (ref 8.1–13.5)
POTASSIUM SERPL-SCNC: 3.5 MMOL/L (ref 3.7–5.3)
RBC # BLD: 4.37 M/UL (ref 4.21–5.77)
SEDIMENTATION RATE, ERYTHROCYTE: 22 MM/HR (ref 0–20)
SEG NEUTROPHILS: 88 % (ref 36–65)
SEGMENTED NEUTROPHILS ABSOLUTE COUNT: 16.19 K/UL (ref 1.5–8.1)
SODIUM BLD-SCNC: 133 MMOL/L (ref 135–144)
WBC # BLD: 18.6 K/UL (ref 3.5–11.3)

## 2023-01-27 PROCEDURE — 36415 COLL VENOUS BLD VENIPUNCTURE: CPT

## 2023-01-27 PROCEDURE — 99223 1ST HOSP IP/OBS HIGH 75: CPT | Performed by: NURSE PRACTITIONER

## 2023-01-27 PROCEDURE — 87075 CULTR BACTERIA EXCEPT BLOOD: CPT

## 2023-01-27 PROCEDURE — 85652 RBC SED RATE AUTOMATED: CPT

## 2023-01-27 PROCEDURE — 87205 SMEAR GRAM STAIN: CPT

## 2023-01-27 PROCEDURE — 2580000003 HC RX 258: Performed by: NURSE PRACTITIONER

## 2023-01-27 PROCEDURE — 6370000000 HC RX 637 (ALT 250 FOR IP): Performed by: NURSE PRACTITIONER

## 2023-01-27 PROCEDURE — 83605 ASSAY OF LACTIC ACID: CPT

## 2023-01-27 PROCEDURE — 86403 PARTICLE AGGLUT ANTBDY SCRN: CPT

## 2023-01-27 PROCEDURE — 80048 BASIC METABOLIC PNL TOTAL CA: CPT

## 2023-01-27 PROCEDURE — 93971 EXTREMITY STUDY: CPT

## 2023-01-27 PROCEDURE — 85379 FIBRIN DEGRADATION QUANT: CPT

## 2023-01-27 PROCEDURE — 86140 C-REACTIVE PROTEIN: CPT

## 2023-01-27 PROCEDURE — 87070 CULTURE OTHR SPECIMN AEROBIC: CPT

## 2023-01-27 PROCEDURE — 6360000002 HC RX W HCPCS: Performed by: NURSE PRACTITIONER

## 2023-01-27 PROCEDURE — 1200000000 HC SEMI PRIVATE

## 2023-01-27 PROCEDURE — 87040 BLOOD CULTURE FOR BACTERIA: CPT

## 2023-01-27 PROCEDURE — 85025 COMPLETE CBC W/AUTO DIFF WBC: CPT

## 2023-01-27 PROCEDURE — 96365 THER/PROPH/DIAG IV INF INIT: CPT

## 2023-01-27 PROCEDURE — 99285 EMERGENCY DEPT VISIT HI MDM: CPT

## 2023-01-27 PROCEDURE — 82947 ASSAY GLUCOSE BLOOD QUANT: CPT

## 2023-01-27 RX ORDER — 0.9 % SODIUM CHLORIDE 0.9 %
500 INTRAVENOUS SOLUTION INTRAVENOUS ONCE
Status: COMPLETED | OUTPATIENT
Start: 2023-01-27 | End: 2023-01-27

## 2023-01-27 RX ORDER — SODIUM CHLORIDE 0.9 % (FLUSH) 0.9 %
10 SYRINGE (ML) INJECTION PRN
Status: DISCONTINUED | OUTPATIENT
Start: 2023-01-27 | End: 2023-01-30 | Stop reason: HOSPADM

## 2023-01-27 RX ORDER — ASPIRIN 81 MG/1
81 TABLET ORAL DAILY
Status: DISCONTINUED | OUTPATIENT
Start: 2023-01-28 | End: 2023-01-30 | Stop reason: HOSPADM

## 2023-01-27 RX ORDER — POTASSIUM CHLORIDE 20 MEQ/1
40 TABLET, EXTENDED RELEASE ORAL PRN
Status: DISCONTINUED | OUTPATIENT
Start: 2023-01-27 | End: 2023-01-30 | Stop reason: HOSPADM

## 2023-01-27 RX ORDER — HEPARIN SODIUM 5000 [USP'U]/ML
5000 INJECTION, SOLUTION INTRAVENOUS; SUBCUTANEOUS EVERY 8 HOURS SCHEDULED
Status: DISCONTINUED | OUTPATIENT
Start: 2023-01-27 | End: 2023-01-30 | Stop reason: HOSPADM

## 2023-01-27 RX ORDER — POLYETHYLENE GLYCOL 3350 17 G/17G
17 POWDER, FOR SOLUTION ORAL DAILY PRN
Status: DISCONTINUED | OUTPATIENT
Start: 2023-01-27 | End: 2023-01-30 | Stop reason: HOSPADM

## 2023-01-27 RX ORDER — ACETAMINOPHEN 325 MG/1
650 TABLET ORAL EVERY 6 HOURS PRN
Status: DISCONTINUED | OUTPATIENT
Start: 2023-01-27 | End: 2023-01-30 | Stop reason: HOSPADM

## 2023-01-27 RX ORDER — GLIPIZIDE 5 MG/1
5 TABLET ORAL
Status: DISCONTINUED | OUTPATIENT
Start: 2023-01-28 | End: 2023-01-28

## 2023-01-27 RX ORDER — DEXTROSE MONOHYDRATE 100 MG/ML
INJECTION, SOLUTION INTRAVENOUS CONTINUOUS PRN
Status: DISCONTINUED | OUTPATIENT
Start: 2023-01-27 | End: 2023-01-30 | Stop reason: HOSPADM

## 2023-01-27 RX ORDER — MAGNESIUM SULFATE 1 G/100ML
1000 INJECTION INTRAVENOUS PRN
Status: DISCONTINUED | OUTPATIENT
Start: 2023-01-27 | End: 2023-01-30 | Stop reason: HOSPADM

## 2023-01-27 RX ORDER — ONDANSETRON 4 MG/1
4 TABLET, ORALLY DISINTEGRATING ORAL EVERY 8 HOURS PRN
Status: DISCONTINUED | OUTPATIENT
Start: 2023-01-27 | End: 2023-01-30 | Stop reason: HOSPADM

## 2023-01-27 RX ORDER — ACETAMINOPHEN 650 MG/1
650 SUPPOSITORY RECTAL EVERY 6 HOURS PRN
Status: DISCONTINUED | OUTPATIENT
Start: 2023-01-27 | End: 2023-01-30 | Stop reason: HOSPADM

## 2023-01-27 RX ORDER — SODIUM CHLORIDE 0.9 % (FLUSH) 0.9 %
5-40 SYRINGE (ML) INJECTION EVERY 12 HOURS SCHEDULED
Status: DISCONTINUED | OUTPATIENT
Start: 2023-01-27 | End: 2023-01-30 | Stop reason: HOSPADM

## 2023-01-27 RX ORDER — ATORVASTATIN CALCIUM 10 MG/1
10 TABLET, FILM COATED ORAL NIGHTLY
Status: DISCONTINUED | OUTPATIENT
Start: 2023-01-27 | End: 2023-01-30 | Stop reason: HOSPADM

## 2023-01-27 RX ORDER — ONDANSETRON 2 MG/ML
4 INJECTION INTRAMUSCULAR; INTRAVENOUS EVERY 6 HOURS PRN
Status: DISCONTINUED | OUTPATIENT
Start: 2023-01-27 | End: 2023-01-30 | Stop reason: HOSPADM

## 2023-01-27 RX ORDER — SODIUM CHLORIDE 9 MG/ML
INJECTION, SOLUTION INTRAVENOUS PRN
Status: DISCONTINUED | OUTPATIENT
Start: 2023-01-27 | End: 2023-01-30 | Stop reason: HOSPADM

## 2023-01-27 RX ORDER — ATENOLOL 50 MG/1
100 TABLET ORAL DAILY
Status: DISCONTINUED | OUTPATIENT
Start: 2023-01-28 | End: 2023-01-30 | Stop reason: HOSPADM

## 2023-01-27 RX ORDER — POTASSIUM CHLORIDE 7.45 MG/ML
10 INJECTION INTRAVENOUS PRN
Status: DISCONTINUED | OUTPATIENT
Start: 2023-01-27 | End: 2023-01-30 | Stop reason: HOSPADM

## 2023-01-27 RX ORDER — SODIUM CHLORIDE 9 MG/ML
INJECTION, SOLUTION INTRAVENOUS CONTINUOUS
Status: DISCONTINUED | OUTPATIENT
Start: 2023-01-27 | End: 2023-01-29

## 2023-01-27 RX ADMIN — POTASSIUM CHLORIDE 40 MEQ: 1500 TABLET, EXTENDED RELEASE ORAL at 21:26

## 2023-01-27 RX ADMIN — CEFAZOLIN 1000 MG: 1 INJECTION, POWDER, FOR SOLUTION INTRAMUSCULAR; INTRAVENOUS at 16:10

## 2023-01-27 RX ADMIN — HEPARIN SODIUM 5000 UNITS: 5000 INJECTION INTRAVENOUS; SUBCUTANEOUS at 21:25

## 2023-01-27 RX ADMIN — ATORVASTATIN CALCIUM 10 MG: 10 TABLET, FILM COATED ORAL at 21:25

## 2023-01-27 RX ADMIN — SODIUM CHLORIDE: 9 INJECTION, SOLUTION INTRAVENOUS at 19:46

## 2023-01-27 RX ADMIN — SODIUM CHLORIDE 500 ML: 9 INJECTION, SOLUTION INTRAVENOUS at 14:49

## 2023-01-27 ASSESSMENT — ENCOUNTER SYMPTOMS
SHORTNESS OF BREATH: 0
COUGH: 0
COLOR CHANGE: 1

## 2023-01-27 ASSESSMENT — PAIN - FUNCTIONAL ASSESSMENT
PAIN_FUNCTIONAL_ASSESSMENT: NONE - DENIES PAIN
PAIN_FUNCTIONAL_ASSESSMENT: NONE - DENIES PAIN

## 2023-01-27 NOTE — ED NOTES
ED to inpatient nurses report     Chief Complaint   Patient presents with    Leg Pain     Pt states he noticed he had cellulitis this morning on his Rt leg. Present to ED from home for c/o redness and swelling to R lower leg. Pt reports he noticed it yesterday and his wife reports she noticed it today. Pt wife reports they spoke to a family friend who is a nurse and that person recommended evaluation in ED. Pt reports PMH of pre-diabetes.   LOC: alert and orientated to name, place, date  Vital signs   Vitals:    01/27/23 1236   BP: (!) 119/53   Pulse: 76   Resp: 18   Temp: 99.6 °F (37.6 °C)   TempSrc: Oral   SpO2: 96%   Weight: 285 lb (129.3 kg)   Height: 6' (1.829 m)      Oxygen Baseline RA    Current needs required RA   SEPSIS:   [yes] Lactate X 2 ordered (Yes or No)  [yes] Antibiotics given (Yes or No)  [yes] IV Fluids ordered (Yes or No)             [yes] IV completed (Yes or No)  LDAs:   Peripheral IV 01/27/23 Right Antecubital (Active)   Site Assessment Clean, dry & intact 01/27/23 1350   Line Status Blood return noted;Brisk blood return;Flushed;Specimen collected;Normal saline locked 01/27/23 1350   Phlebitis Assessment No symptoms 01/27/23 1350   Infiltration Assessment 0 01/27/23 1350   Dressing Status New dressing applied 01/27/23 1350     Mobility: Independent  Fall Risk:    Pending ED orders: N/A  Present condition: Stable  Code Status: Unknown  Consults: IP CONSULT TO INTERNAL MEDICINE  [x]  Hospitalist  Completed  [x] yes [] no Who: Intermed  []  Medicine  Completed  [] yes [] No Who:   []  Cardiology  Completed  [] yes [] No Who:   []  GI   Completed  [] yes [] No Who:   []  Neurology  Completed  [] yes [] No Who:   []  Nephrology Completed  [] yes [] No Who:    []  Vascular  Completed  [] yes [] No Who:   []  Ortho  Completed  [] yes [] No Who:     []  Surgery  Completed  [] yes [] No Who:    []  Urology  Completed  [] yes [] No Who:    []  CT Surgery Completed  [] yes [] No Who:   [] Podiatry  Completed  [] yes [] No Who:    []  Other    Completed  [] yes [] No Who:  Interventions: BC X2, IV ATB/fluids  Important Events:         Electronically signed by Kinza Reveles RN on 1/27/2023 at 4:31 PM       Kinza Reveles RN  01/27/23 7708

## 2023-01-27 NOTE — CARE COORDINATION
Case Management Assessment  Initial Evaluation    Date/Time of Evaluation: 1/27/2023 4:42 PM  Assessment Completed by: SU Li    If patient is discharged prior to next notation, then this note serves as note for discharge by case management. Patient Name: Sammi Hines                   YOB: 1950  Diagnosis: Cellulitis [L03.90]                   Date / Time: 1/27/2023  1:27 PM    Patient Admission Status: Inpatient   Readmission Risk (Low < 19, Mod (19-27), High > 27): No data recorded  Current PCP: RILEY Swift CNP  PCP verified by CM? Yes    Chart Reviewed: Yes      History Provided by: Patient  Patient Orientation: Alert and Oriented    Patient Cognition: Alert    Hospitalization in the last 30 days (Readmission):  No    If yes, Readmission Assessment in CM Navigator will be completed. Advance Directives:      Code Status: Prior   Patient's Primary Decision Maker is: Legal Next of Kin      Discharge Planning:    Patient lives with: Spouse/Significant Other Type of Home:    Primary Care Giver: Spouse  Patient Support Systems include: Spouse/Significant Other   Current Financial resources: Medicare  Current community resources:    Current services prior to admission: None            Current DME:              Type of Home Care services:  None    ADLS  Prior functional level: Independent in ADLs/IADLs  Current functional level: Independent in ADLs/IADLs    PT AM-PAC:   /24  OT AM-PAC:   /24    Family can provide assistance at DC: Yes  Would you like Case Management to discuss the discharge plan with any other family members/significant others, and if so, who?  No  Plans to Return to Present Housing: Yes  Other Identified Issues/Barriers to RETURNING to current housing: none  Potential Assistance needed at discharge: N/A            Potential DME:    Patient expects to discharge to:    Plan for transportation at discharge:      Financial    Payor: [address] [place] [address]  MCR / Plan: [employer] ADVANTAGE HMO-POS / Product Type: *No Product type* /     Does insurance require precert for SNF: Yes    Potential assistance Purchasing Medications: No  Meds-to-Beds request:        [employer] ([address] [facility] [address], [address] [phone] - F [phone]  [address]  [facility] [address]  Phone: [phone] Fax: Reinier Long [address], [address] [facility] [phone] - f [phone]  [address] [address]  Phone: [phone] Fax: [phone]      Notes:    Factors facilitating achievement of predicted outcomes: Family support    Barriers to discharge: none    Additional Case Management Notes: Patient lives with wife. He reports that he has not had [place] [address] or SNF in the past. Reports that he does not have any need at this time. No home O2. Given IV Ancef. Patient is independent and plans to go home independently at discharge. The Plan for Transition of Care is related to the following treatment goals of Cellulitis [V56.94]    IF APPLICABLE: The Patient and/or patient representative Samantha Ngo and his family were provided with a choice of provider and agrees with the discharge plan. Freedom of choice list with basic dialogue that supports the patient's individualized plan of care/goals and shares the quality data associated with the providers was provided to:     Patient Representative Name:       The Patient and/or Patient Representative Agree with the Discharge Plan?       Yanick Caputo [facility]  Case Management Department  Ph: [phone] Fax: [phone]

## 2023-01-27 NOTE — PROGRESS NOTES
Transitions of Care Pharmacy Service   Medication Review    The patient's list of current home medications has been reviewed. Source(s) of information: Patient/ Surescripts    Based on information provided by the above source(s), I have updated the patient's home med list as described below. Please review the ACTION REQUESTED section of this note below for any discrepancies on current hospital orders. I changed or updated the following medications on the patient's home medication list:  Removed Flomax 0.4mg (6/2022)     Added none     Adjusted   none   Other Notes none         PROVIDER ACTION REQUESTED  Medications that need to be addressed by a physician/nurse practitioner:    Medication Action Requested        none         Please feel free to call me with any questions about this encounter. Thank you. Velia Nicole, [facility]   Transitions of Care Pharmacy Service  Phone:  [phone]  Fax: [phone]      Electronically signed by Velia Nicole [facility] on 1/27/2023 at 4:34 PM           Not in a hospital admission.

## 2023-01-27 NOTE — ED PROVIDER NOTES
eMERGENCY dEPARTMENT eNCOUnter   Independent Attestation     Pt Name: Vijay Madden  MRN: [id number]  [place] 1950  Date of evaluation: 1/27/23     Vijay Madden is a 67 y.o. male with CC: Leg Pain (Pt states he noticed he had cellulitis this morning on his Rt leg. )        This visit was performed by both a physician and an APC. I performed all aspects of the MDM as documented.       The care is provided during an unprecedented national emergency due to the novel coronavirus, Emir Barry MD  Attending Emergency Physician           Marissa Yates MD  01/27/23 0680

## 2023-01-27 NOTE — PROGRESS NOTES
Pharmacy Note - Renal dose adjustment made per P/T protocol    Original order:  Ancef 1g Q8h    Estimated Creatinine Clearance: 74 mL/min (A) (based on SCr of 1.26 mg/dL (H)). Recent Labs     01/27/23  1356   BUN 27*   CREATININE 1.26*       Renally adjusted order:  Ancef 2g Q8h    Please call pharmacy with any questions.     Thank you,  Lizeth Cuevas, [facility]  1/27/2023 6:35 PM

## 2023-01-27 NOTE — ED PROVIDER NOTES
[address] ED  eMERGENCY dEPARTMENT eNCOUnter      Pt Name: Ashley Burnette  MRN: [id number]  [place] 1950  Date of evaluation: 1/27/2023  Provider: Rosalin Krabbe, APRN - [place] [id number]       Chief Complaint   Patient presents with    Leg Pain     Pt states he noticed he had cellulitis this morning on his Rt leg. HISTORY OF PRESENT ILLNESS  (Location/Symptom, Timing/Onset, Context/Setting, Quality, Duration, Modifying Factors, Severity.)   Ashley Burnette is a 67 y.o. male who presents to the emergency department. C/o pain, swelling, erythema to his right lower leg. He noticed it yesterday. There is an open, draining area to the right lateral lower leg. He denies injury, fever, chills, weakness, N/T. Denies CP, SOB, dizziness. Rates his pain 4/10 at this time. Nursing Notes were reviewed. ALLERGIES     Patient has no known allergies. CURRENT MEDICATIONS       Previous Medications    AMLODIPINE-BENAZEPRIL (LOTREL) 5-20 MG PER CAPSULE    TAKE 1 CAPSULE DAILY    ASPIRIN 81 MG EC TABLET    Take 81 mg by mouth daily    ATENOLOL (TENORMIN) 100 MG TABLET    TAKE 1 TABLET DAILY    GILDARDO MICROLET LANCETS MISC    1 applicator by Does not apply route 2 times daily DX: DM E11.9    COLESEVELAM (WELCHOL) 625 MG TABLET    TAKE 1 TABLET TWICE A DAY WITH MEALS    GLIMEPIRIDE (AMARYL) 2 MG TABLET    TAKE 1 TABLET EVERY MORNING BEFORE BREAKFAST    GLUCOSE BLOOD VI TEST STRIPS (GILDARDO CONTOUR NEXT TEST) STRIP    1 each by In Vitro route 2 times daily DX: DM E11.9    HYDROCHLOROTHIAZIDE (HYDRODIURIL) 25 MG TABLET    Take 1 tablet by mouth every morning    METFORMIN (GLUCOPHAGE) 1000 MG TABLET    Take 1 tab po bid with meals    OMEGA-3 FATTY ACIDS (FISH OIL) 1200 MG CAPS    Take 1,200 mg by mouth daily.     SIMVASTATIN (ZOCOR) 20 MG TABLET    TAKE 1 TABLET EVERY EVENING       PAST MEDICAL HISTORY         Diagnosis Date    Atherosclerosis of aortic bifurcation and common iliac arteries ([facility] 2018    H/O cardiac catheterization     Hyperlipidemia     on med    Hypertension     on med    Skin cancer     Type 2 diabetes mellitus ([facility]        SURGICAL HISTORY           Procedure Laterality Date    CARDIAC CATHETERIZATION  several    COLONOSCOPY      INGUINAL HERNIA REPAIR Right     PENIS SURGERY      SKIN CANCER EXCISION      TIBIA FRACTURE SURGERY Left     UPPER GASTROINTESTINAL ENDOSCOPY           FAMILY HISTORY           Problem Relation Age of Onset    Kidney Cancer Father     COPD Mother     Other Mother         aneurysm    No Known Problems Sister      Family Status   Relation Name Status    Father      Mother      Sister          SOCIAL HISTORY      reports that he quit smoking about 43 years ago. His smoking use included cigarettes. He has a 20.00 pack-year smoking history. He has never used smokeless tobacco. He reports that he does not drink alcohol and does not use drugs. REVIEW OF SYSTEMS    (2-9 systems for level 4, 10 or more for level 5)     Review of Systems   Constitutional:  Negative for chills, diaphoresis, fatigue and fever. Respiratory:  Negative for cough and shortness of breath. Cardiovascular:  Negative for chest pain. Musculoskeletal:  Positive for myalgias. Negative for arthralgias. Skin:  Positive for color change and wound. Negative for rash. Neurological:  Negative for dizziness, weakness, light-headedness, numbness and headaches. Except as noted above the remainder of the review of systems was reviewed and negative. PHYSICAL EXAM    (up to 7 for level 4, 8 or more for level 5)     ED Triage Vitals [23 1236]   BP Temp Temp Source Heart Rate Resp SpO2 Height Weight   (!) 119/53 99.6 °F (37.6 °C) Oral 76 18 96 % 6' (1.829 m) 285 lb (129.3 kg)     Physical Exam  Vitals reviewed. Constitutional:       General: He is not in acute distress. Appearance: He is well-developed. He is not diaphoretic.    Eyes: Conjunctiva/sclera: Conjunctivae normal.   Cardiovascular:      Rate and Rhythm: Normal rate. Pulmonary:      Effort: Pulmonary effort is normal. No respiratory distress. Musculoskeletal:      Cervical back: Neck supple. Right lower leg: Edema (1+) present. Left lower leg: No edema. Skin:     General: Skin is warm and dry. Capillary Refill: Capillary refill takes less than 2 seconds. Findings: Erythema (right lower leg and foot. There is an open wound with yellow drainage to the lateral lower leg. Area approx 1 cm in diameter.) present. No rash. Neurological:      Mental Status: He is alert and oriented to person, place, and time. Psychiatric:         Behavior: Behavior normal.        DIAGNOSTIC RESULTS     RADIOLOGY:   Non-plain film images such as CT, Ultrasound and MRI are read by the radiologist. Plain radiographic images are visualized and preliminarily interpreted by the emergency physician with the below findings:    Interpretation per the Radiologist below, if available at the time of this note:    Venous doppler of the RLE was negative for DVT.       LABS:  Labs Reviewed   BASIC METABOLIC PANEL - Abnormal; Notable for the following components:       Result Value    Glucose 205 (*)     BUN 27 (*)     Creatinine 1.26 (*)     Bun/Cre Ratio 21 (*)     Sodium 133 (*)     Potassium 3.5 (*)     All other components within normal limits   CBC WITH AUTO DIFFERENTIAL - Abnormal; Notable for the following components:    WBC 18.6 (*)     Seg Neutrophils 88 (*)     Lymphocytes 6 (*)     Eosinophils % 0 (*)     Immature Granulocytes 1 (*)     Segs Absolute 16.19 (*)     All other components within normal limits   SEDIMENTATION RATE - Abnormal; Notable for the following components:    Sed Rate 22 (*)     All other components within normal limits   C-REACTIVE PROTEIN - Abnormal; Notable for the following components:    .3 (*)     All other components within normal limits   LACTATE, SEPSIS - Abnormal; Notable for the following components:    Lactic Acid, Sepsis 2.5 (*)     All other components within normal limits   D-DIMER, QUANTITATIVE - Abnormal; Notable for the following components:    D-Dimer, Quant 1.11 (*)     All other components within normal limits   CULTURE, ANAEROBIC AND AEROBIC   CULTURE, BLOOD 1   CULTURE, BLOOD 1   LACTATE, SEPSIS       All other labs were within normal range or not returned as of this dictation. EMERGENCY DEPARTMENT COURSE and DIFFERENTIAL DIAGNOSIS/MDM:   Vitals:    Vitals:    01/27/23 1236   BP: (!) 119/53   Pulse: 76   Resp: 18   Temp: 99.6 °F (37.6 °C)   TempSrc: Oral   SpO2: 96%   Weight: 285 lb (129.3 kg)   Height: 6' (1.829 m)         MEDICATIONS GIVEN IN THE ED:  Medications   0.9 % sodium chloride bolus (500 mLs IntraVENous New Bag 1/27/23 1449)   ceFAZolin (ANCEF) 1,000 mg in sodium chloride 0.9 % 50 mL IVPB (mini-bag) (has no administration in time range)       CLINICAL DECISION MAKING:  The patient presented alert with a nontoxic appearance and was seen in conjunction with Dr. Thalia Alvarado. DDx include cellulitis, open wound leg, DVT. I will order labs including CBC, BMP lactic acid x2, sed rate, CRP, blood cultures x2, d-dimer. Venous doppler of the RLE will be completed. The patient will be monitored closely. The patient and his wife were involved in his plan of care through shared decision making. The testing that was ordered was discussed with the patient and his wife. Any medications that may have been ordered were discussed with the patient and his wife. I have reviewed the patient's previous medical records using the electronic health record that we have available that were pertinent to today's visit. Labs and imaging were reviewed. WBC count was 18.6, , sed rate 22, lactic acid 2.5. BUN was 27, creatinine 1.26; this is above his baseline.   Imaging was reviewed and reported by the radiologist. Results showed no findings of a DVT to the RLE. I have discusssed recommendation for admission with the patient and his significant other. We have discussed benefits of admission and the risks of discharge home. The patient agrees with the plan of care and admission. The patient will be admitted for further evaluation and treatment. I have consulted internal medicine. The patient will be [facility]. I spoke with Jackie SMITH, he/she agrees to accept the patient for further evaluation and treatment. Care was provided during an unprecedented national emergency due to the novel coronavirus, Covid-19. CONSULTS:  IP CONSULT TO INTERNAL MEDICINE        FINAL IMPRESSION      1. Cellulitis of right lower extremity    2. Open wound of right lower leg, initial encounter    3. SARA (acute kidney injury) ([facility]            Problem List  Patient Active Problem List   Diagnosis Code    Mixed hyperlipidemia E78.2    Hyperglycemia R73.9    Essential hypertension I10    Type 2 diabetes mellitus without complication, without long-term current use of insulin ([facility] E11.9    Atherosclerosis of aortic bifurcation and common iliac arteries (HCC) I70.0, I70.8    Squamous cell carcinoma of scalp C44.42         DISPOSITION/PLAN   DISPOSITION Decision To Admit 01/27/2023 03:36:23 PM      PATIENT REFERRED TO:   No follow-up provider specified.     DISCHARGE MEDICATIONS:     New Prescriptions    No medications on file           (Please note that portions of this note were completed with a voice recognition program.  Efforts were made to edit the dictations but occasionally words are mis-transcribed.)    Jennifer Ezekiel, APRN - [address], APRN - CNP  01/27/23 0222

## 2023-01-27 NOTE — H&P
[facility]  Office: [address], DO, Jeanine Tucker, DO, Fifi Spears, DO, Pernell Soto Blood, DO, Juhi Sotomayor MD, Dread Lewis MD, Joe Shin MD, Chelsea Juarez MD,  Brandan Lovelace MD, France Carter MD, Devin Kat DO, America Meraz MD,  Salome Painter MD, Jami Naqvi MD, Anais Joahnsen DO, Keo Galan MD, Savita Wiggins MD, Mariela Knox DO, Cristian Santos MD, Deanna Muñoz MD, Hetal King MD, Alissa Salmon MD, Ajay Vazquez DO, Brendan Payne MD, Ludivina Corral MD, Jem Willard, CNP,  Francisca Keenan, CNP, Gaurav Lopez, CNP, Wilian Mejia, CNP,  Rena Arriaza, [facility], Jocy Hoffmann, CNP, Venkatesh Dorman, CNP, Mkuund Mueller, CNP, Kd Brown, CNP, Brody Ravi, CNP, Keyla Brennan PA-C, Joseline Duenas, CNS, Immanuel Vanessa, CNP, Pipo Yu, [facility]    HISTORY AND PHYSICAL EXAMINATION            Date:   1/27/2023  Patient name:  Luciano Mills  Date of admission:  1/27/2023  1:27 PM  MRN:   [id number]  Account:  [de-identified]  YOB: 1950  PCP:    RILEY Galo CNP  Room:   [place] [address]  Code Status:    Prior    Chief Complaint:     Chief Complaint   Patient presents with    Leg Pain     Pt states he noticed he had cellulitis this morning on his Rt leg. History Obtained From:     patient    History of Present Illness:     Luciano Mills is a 67 y.o. Non- / non  male with a history of type 2 diabetes and hypertension who presents with a 1 day history of right lower extremity redness and swelling and is admitted to the hospital for the management of Cellulitis. Patient denies any acute injury but states he has been doing some work around the house and may have injured himself without realizing it. He reports that he has some chronic decree sensation due to bilateral lower extremities from his diabetes.   There is a small nishant sized wound to right lower outer leg with serosanguineous drainage. His right leg is warm to touch, thematous and erythematous. Doppler study done in ED was negative. He denies any pain to his leg    Last A1c in epic was on 10/26/2002 and was 6.2 at that time. Patient reports he just got repeat drawn yesterday but does not have the results yet, no results in epic. He does not take any insulin at home, he reports he takes metformin    His creatinine is slightly elevated at 1.26, baseline is normal  Wound culture was taken in ED  Past Medical History:     Past Medical History:   Diagnosis Date    Atherosclerosis of aortic bifurcation and common iliac arteries ([address] 12/13/2018    H/O cardiac catheterization 2003    Hyperlipidemia     on med    Hypertension     on med    Skin cancer     Type 2 diabetes mellitus (HCC)         Past Surgical History:     Past Surgical History:   Procedure Laterality Date    CARDIAC CATHETERIZATION  several    COLONOSCOPY      INGUINAL HERNIA REPAIR Right     PENIS SURGERY      SKIN CANCER EXCISION      TIBIA FRACTURE SURGERY Left     UPPER GASTROINTESTINAL ENDOSCOPY          Medications Prior to Admission:     Prior to Admission medications    Medication Sig Start Date End Date Taking?  Authorizing Provider   hydroCHLOROthiazide (HYDRODIURIL) 25 MG tablet Take 1 tablet by mouth every morning 1/16/23   RILEY Mcgee CNP   atenolol (TENORMIN) 100 MG tablet TAKE 1 TABLET DAILY  Patient taking differently: Take 100 mg by mouth daily TAKE 1 TABLET DAILY 1/11/23   RILEY Mcgee CNP   metFORMIN (GLUCOPHAGE) 1000 MG tablet Take 1 tab po bid with meals 1/11/23   RILEY Mcgee CNP   simvastatin (ZOCOR) 20 MG tablet TAKE 1 TABLET EVERY EVENING  Patient taking differently: Take 20 mg by mouth nightly 11/9/22   RILEY Mcgee CNP   amLODIPine-benazepril (LOTREL) 5-20 MG per capsule TAKE 1 CAPSULE DAILY  Patient taking differently: Take 1 capsule by mouth daily TAKE 1 CAPSULE DAILY 8/11/22   Lele Mail, APRN - CNP   [facility] 625 MG tablet TAKE 1 TABLET TWICE A DAY WITH MEALS  Patient taking differently: Take 625 mg by mouth 2 times daily (with meals) 6/6/22   Elinor Dolan PA-C   glimepiride (AMARYL) 2 MG tablet TAKE 1 TABLET EVERY MORNING BEFORE BREAKFAST  Patient taking differently: Take 2 mg by mouth every morning (before breakfast) 5/13/22   Lele Mail, APRN - CNP   simvastatin (ZOCOR) 20 MG tablet TAKE 1 TABLET EVERY EVENING 5/17/21   Lele Mail, APRN - CNP   aspirin 81 MG EC tablet Take 81 mg by mouth daily    Historical Provider, MD   glucose blood VI test strips (GILDARDO CONTOUR NEXT TEST) strip 1 each by In Vitro route 2 times daily DX: DM E11.9 10/20/16 10/12/22  Jesus Lyon MD   GILDARDO MICROLET LANCETS MISC 1 applicator by Does not apply route 2 times daily DX: DM E11.9 10/20/16 10/12/22  Jesus Lyon MD   Omega-3 Fatty Acids (FISH OIL) 1200 MG CAPS Take 1,200 mg by mouth daily. Historical Provider, MD        Allergies:     Patient has no known allergies. Social History:     Tobacco:    reports that he quit smoking about 43 years ago. His smoking use included cigarettes. He has a 20.00 pack-year smoking history. He has never used smokeless tobacco.  Alcohol:      reports no history of alcohol use. Drug Use:  reports no history of drug use. Family History:     Family History   Problem Relation Age of Onset    Kidney Cancer Father     COPD Mother     Other Mother         aneurysm    No Known Problems Sister        Review of Systems:     Positive and Negative as described in HPI.     CONSTITUTIONAL:  negative for fevers, chills, sweats, fatigue, weight loss  HEENT:  negative for vision, hearing changes, runny nose, throat pain  RESPIRATORY:  negative for shortness of breath, cough, congestion, wheezing  CARDIOVASCULAR:  negative for chest pain, palpitations  GASTROINTESTINAL:  negative for nausea, vomiting, diarrhea, constipation, change in bowel habits, abdominal pain   GENITOURINARY:  negative for difficulty of urination, burning with urination, frequency   INTEGUMENT: Positive for rash, wound, and erythema  HEMATOLOGIC/LYMPHATIC:  positive for swelling/edema   ALLERGIC/IMMUNOLOGIC:  negative for urticaria , itching  ENDOCRINE:  negative increase in drinking, increase in urination, hot or cold intolerance  MUSCULOSKELETAL:  negative joint pains, muscle aches, swelling of joints  NEUROLOGICAL:  negative for headaches, dizziness, lightheadedness, pain, tingling extremities, positive for chronic decreased sensation to lower extremities  BEHAVIOR/PSYCH:  negative for depression, anxiety    Physical Exam:   BP (!) 110/59   Pulse 78   Temp 99.3 °F (37.4 °C) (Oral)   Resp 20   Ht 6' (1.829 m)   Wt 285 lb (129.3 kg)   SpO2 94%   BMI 38.65 kg/m²   Temp (24hrs), Av.5 °F (37.5 °C), Min:99.3 °F (37.4 °C), Max:99.6 °F (37.6 °C)    No results for input(s): POCGLU in the last 72 hours. Intake/Output Summary (Last 24 hours) at 2023 1759  Last data filed at 2023 1640  Gross per 24 hour   Intake 549.99 ml   Output --   Net 549.99 ml       General Appearance:  alert, well appearing, and in no acute distress  Mental status: oriented to person, place, and time  Head:  normocephalic, atraumatic  Eye: no icterus, redness, pupils equal and reactive, extraocular eye movements intact, conjunctiva clear  Ear: normal external ear, no discharge, hearing intact  Nose:  no drainage noted  Mouth: mucous membranes moist  Neck: supple, no carotid bruits, thyroid not palpable  Lungs: Bilateral equal air entry, clear to auscultation, no wheezing, rales or rhonchi, normal effort  Cardiovascular: normal rate, regular rhythm, no murmur, gallop, rub.   Abdomen: Soft, nontender, nondistended, normal bowel sounds, no hepatomegaly or splenomegaly  Neurologic: There are no new focal motor or sensory deficits, normal muscle tone and bulk, no abnormal sensation, normal speech, cranial nerves II through XII grossly intact  Skin: No gross lesions, rashes, bruising or bleeding on exposed skin area  Extremities:  peripheral pulses palpable, no pedal edema or calf pain with palpation  Psych: normal affect     Investigations:      Laboratory Testing:  Recent Results (from the past 24 hour(s))   BMP    Collection Time: 01/27/23  1:56 PM   Result Value Ref Range    Glucose 205 (H) 70 - 99 mg/dL    BUN 27 (H) 8 - 23 mg/dL    Creatinine 1.26 (H) 0.70 - 1.20 mg/dL    Est, Glom Filt Rate >60 >60 mL/min/1.73m2    Bun/Cre Ratio 21 (H) 9 - 20    Calcium 9.2 8.6 - 10.4 mg/dL    Sodium 133 (L) 135 - 144 mmol/L    Potassium 3.5 (L) 3.7 - 5.3 mmol/L    Chloride 98 98 - 107 mmol/L    CO2 22 20 - 31 mmol/L    Anion Gap 13 9 - 17 mmol/L   CBC with Auto Differential    Collection Time: 01/27/23  1:56 PM   Result Value Ref Range    WBC 18.6 (H) 3.5 - 11.3 k/uL    RBC 4.37 4.21 - 5.77 m/uL    Hemoglobin 13.9 13.0 - 17.0 g/dL    Hematocrit 41.8 40.7 - 50.3 %    MCV 95.7 82.6 - 102.9 fL    MCH 31.8 25.2 - 33.5 pg    MCHC 33.3 28.4 - 34.8 g/dL    RDW 13.8 11.8 - 14.4 %    Platelets 247 434 - 185 k/uL    MPV 11.5 8.1 - 13.5 fL    NRBC Automated 0.0 0.0 per 100 WBC    Seg Neutrophils 88 (H) 36 - 65 %    Lymphocytes 6 (L) 24 - 43 %    Monocytes 5 3 - 12 %    Eosinophils % 0 (L) 1 - 4 %    Basophils 0 0 - 2 %    Immature Granulocytes 1 (H) 0 %    Segs Absolute 16.19 (H) 1.50 - 8.10 k/uL    Absolute Lymph # 1.16 1.10 - 3.70 k/uL    Absolute Mono # 1.00 0.10 - 1.20 k/uL    Absolute Eos # <0.03 0.00 - 0.44 k/uL    Basophils Absolute 0.07 0.00 - 0.20 k/uL    Absolute Immature Granulocyte 0.15 0.00 - 0.30 k/uL   Sedimentation Rate    Collection Time: 01/27/23  1:56 PM   Result Value Ref Range    Sed Rate 22 (H) 0 - 20 mm/Hr   C-Reactive Protein    Collection Time: 01/27/23  1:56 PM   Result Value Ref Range    .3 (H) 0.0 - 5.0 mg/L   Lactate, Sepsis    Collection Time: 01/27/23  1:56 PM   Result Value Ref Range    Lactic Acid, Sepsis 2.5 (H) 0.5 - 1.9 mmol/L   D-Dimer, Quantitative    Collection Time: 01/27/23  1:56 PM   Result Value Ref Range    D-Dimer, Quant 1.11 (H) 0.00 - 0.59 mg/L FEU   Culture, Blood 1    Collection Time: 01/27/23  3:45 PM    Specimen: Blood   Result Value Ref Range    Specimen Description .BLOOD     Special Requests lt ac 20ml     Culture NO GROWTH <24 HRS    Culture, Blood 1    Collection Time: 01/27/23  3:58 PM    Specimen: Blood   Result Value Ref Range    Specimen Description .BLOOD     Special Requests RT AC 20 ML     Culture NO GROWTH <24 HRS    Lactate, Sepsis    Collection Time: 01/27/23  4:00 PM   Result Value Ref Range    Lactic Acid, Sepsis 1.8 0.5 - 1.9 mmol/L       Imaging/Diagnostics:  No results found.    Assessment :      Hospital Problems             Last Modified POA    * (Principal) Cellulitis 1/27/2023 Yes    SARA (acute kidney injury) ([facility]) 1/27/2023 Yes    Mixed hyperlipidemia 1/27/2023 Yes    Essential hypertension 1/27/2023 Yes    Type 2 diabetes mellitus without complication, without long-term current use of insulin ([facility]) 1/27/2023 Yes       Plan:     Patient status inpatient in the Med/Surge    Resume select home meds  Monitor labs, replace electrolytes as needed  Hold diuretics and nephrotoxic agents  Monitor intake and output  Glycemic control  IV antibiotics  Telemetry monitoring  Monitor and control blood pressure  Await wound culture results  Activity as tolerated  Heparin subcu for DVT prophylaxis  Gentle IV fluids for hydration  Carb controlled diet  Plan discussed with patient and staff    Consultations:   IP CONSULT TO INTERNAL MEDICINE     Patient is admitted as inpatient status because of co-morbidities listed above, severity of signs and symptoms as outlined, requirement for current medical therapies and most importantly because of direct risk to patient if care not provided in a hospital setting.  Expected length of  stay > 48 hours.     RILEY Purdy - NP  1/27/2023  5:59 PM    Copy sent to Dr. Smiley Johnson, APRN - CNP

## 2023-01-27 NOTE — ACP (ADVANCE CARE PLANNING)
Advance Care Planning     Advance Care Planning Activator (Inpatient)  Conversation Note      Date of ACP Conversation: 1/27/2023     Conversation Conducted with: Patient with Decision Making Capacity    ACP Activator: Becky Stuart, [address]:     Current Designated Health Care Decision Maker:     Click here to complete [address] including section of the Healthcare Decision Maker Relationship (ie \"Primary\")  Today we documented Decision Maker(s) consistent with Legal Next of Kin hierarchy. Care Preferences    Ventilation: \"If you were in your present state of health and suddenly became very ill and were unable to breathe on your own, what would your preference be about the use of a ventilator (breathing machine) if it were available to you? \"      Would the patient desire the use of ventilator (breathing machine)?: yes    \"If your health worsens and it becomes clear that your chance of recovery is unlikely, what would your preference be about the use of a ventilator (breathing machine) if it were available to you? \"     Would the patient desire the use of ventilator (breathing machine)?: No      Resuscitation  \"CPR works best to restart the heart when there is a sudden event, like a heart attack, in someone who is otherwise healthy. Unfortunately, CPR does not typically restart the heart for people who have serious health conditions or who are very sick. \"    \"In the event your heart stopped as a result of an underlying serious health condition, would you want attempts to be made to restart your heart (answer \"yes\" for attempt to resuscitate) or would you prefer a natural death (answer \"no\" for do not attempt to resuscitate)? \" yes       [] Yes   [x] No   Educated Patient / Harmna Christian regarding differences between Advance Directives and portable DNR orders.     Length of ACP Conversation in minutes:  2    Conversation Outcomes:  [x] ACP discussion completed  [] Existing advance directive reviewed with patient; no changes to patient's previously recorded wishes  [] New Advance Directive completed  [] Portable Do Not Rescitate prepared for Provider review and signature  [] POLST/POST/MOLST/MOST prepared for Provider review and signature      Follow-up plan:    [] Schedule follow-up conversation to continue planning  [] Referred individual to Provider for additional questions/concerns   [] Advised patient/agent/surrogate to review completed ACP document and update if needed with changes in condition, patient preferences or care setting    [] This note routed to one or more involved healthcare providers

## 2023-01-28 ENCOUNTER — APPOINTMENT (OUTPATIENT)
Dept: CT IMAGING | Age: 73
DRG: 638 | End: 2023-01-28
Payer: MEDICARE

## 2023-01-28 LAB
ABSOLUTE EOS #: 0.07 K/UL (ref 0–0.44)
ABSOLUTE IMMATURE GRANULOCYTE: 0.11 K/UL (ref 0–0.3)
ABSOLUTE LYMPH #: 1.17 K/UL (ref 1.1–3.7)
ABSOLUTE MONO #: 0.85 K/UL (ref 0.1–1.2)
ANION GAP SERPL CALCULATED.3IONS-SCNC: 10 MMOL/L (ref 9–17)
BASOPHILS # BLD: 1 % (ref 0–2)
BASOPHILS ABSOLUTE: 0.06 K/UL (ref 0–0.2)
BUN BLDV-MCNC: 26 MG/DL (ref 8–23)
BUN/CREAT BLD: 21 (ref 9–20)
CALCIUM SERPL-MCNC: 8.6 MG/DL (ref 8.6–10.4)
CHLORIDE BLD-SCNC: 103 MMOL/L (ref 98–107)
CO2: 25 MMOL/L (ref 20–31)
CREAT SERPL-MCNC: 1.22 MG/DL (ref 0.7–1.2)
EOSINOPHILS RELATIVE PERCENT: 1 % (ref 1–4)
GFR SERPL CREATININE-BSD FRML MDRD: >60 ML/MIN/1.73M2
GLUCOSE BLD-MCNC: 110 MG/DL (ref 75–110)
GLUCOSE BLD-MCNC: 114 MG/DL (ref 75–110)
GLUCOSE BLD-MCNC: 168 MG/DL (ref 70–99)
GLUCOSE BLD-MCNC: 214 MG/DL (ref 75–110)
HCT VFR BLD CALC: 38.1 % (ref 40.7–50.3)
HEMOGLOBIN: 12.5 G/DL (ref 13–17)
IMMATURE GRANULOCYTES: 1 %
LYMPHOCYTES # BLD: 10 % (ref 24–43)
MAGNESIUM: 1.8 MG/DL (ref 1.6–2.6)
MCH RBC QN AUTO: 31.7 PG (ref 25.2–33.5)
MCHC RBC AUTO-ENTMCNC: 32.8 G/DL (ref 28.4–34.8)
MCV RBC AUTO: 96.7 FL (ref 82.6–102.9)
MONOCYTES # BLD: 7 % (ref 3–12)
NRBC AUTOMATED: 0 PER 100 WBC
PDW BLD-RTO: 13.9 % (ref 11.8–14.4)
PLATELET # BLD: 159 K/UL (ref 138–453)
PMV BLD AUTO: 11.6 FL (ref 8.1–13.5)
POTASSIUM SERPL-SCNC: 3.3 MMOL/L (ref 3.7–5.3)
RBC # BLD: 3.94 M/UL (ref 4.21–5.77)
SEG NEUTROPHILS: 81 % (ref 36–65)
SEGMENTED NEUTROPHILS ABSOLUTE COUNT: 9.55 K/UL (ref 1.5–8.1)
SODIUM BLD-SCNC: 138 MMOL/L (ref 135–144)
WBC # BLD: 11.8 K/UL (ref 3.5–11.3)

## 2023-01-28 PROCEDURE — 6360000002 HC RX W HCPCS: Performed by: INTERNAL MEDICINE

## 2023-01-28 PROCEDURE — 2580000003 HC RX 258: Performed by: NURSE PRACTITIONER

## 2023-01-28 PROCEDURE — 6370000000 HC RX 637 (ALT 250 FOR IP): Performed by: NURSE PRACTITIONER

## 2023-01-28 PROCEDURE — 99232 SBSQ HOSP IP/OBS MODERATE 35: CPT | Performed by: INTERNAL MEDICINE

## 2023-01-28 PROCEDURE — 6360000002 HC RX W HCPCS: Performed by: NURSE PRACTITIONER

## 2023-01-28 PROCEDURE — 36415 COLL VENOUS BLD VENIPUNCTURE: CPT

## 2023-01-28 PROCEDURE — 2580000003 HC RX 258: Performed by: INTERNAL MEDICINE

## 2023-01-28 PROCEDURE — 1200000000 HC SEMI PRIVATE

## 2023-01-28 PROCEDURE — 99222 1ST HOSP IP/OBS MODERATE 55: CPT | Performed by: INTERNAL MEDICINE

## 2023-01-28 PROCEDURE — 82947 ASSAY GLUCOSE BLOOD QUANT: CPT

## 2023-01-28 PROCEDURE — 83735 ASSAY OF MAGNESIUM: CPT

## 2023-01-28 PROCEDURE — 85025 COMPLETE CBC W/AUTO DIFF WBC: CPT

## 2023-01-28 PROCEDURE — 6370000000 HC RX 637 (ALT 250 FOR IP): Performed by: INTERNAL MEDICINE

## 2023-01-28 PROCEDURE — 74176 CT ABD & PELVIS W/O CONTRAST: CPT

## 2023-01-28 PROCEDURE — 80048 BASIC METABOLIC PNL TOTAL CA: CPT

## 2023-01-28 RX ORDER — INSULIN LISPRO 100 [IU]/ML
0-8 INJECTION, SOLUTION INTRAVENOUS; SUBCUTANEOUS
Status: DISCONTINUED | OUTPATIENT
Start: 2023-01-28 | End: 2023-01-30 | Stop reason: HOSPADM

## 2023-01-28 RX ORDER — LOPERAMIDE HYDROCHLORIDE 2 MG/1
2 CAPSULE ORAL 4 TIMES DAILY PRN
Status: DISCONTINUED | OUTPATIENT
Start: 2023-01-28 | End: 2023-01-29

## 2023-01-28 RX ORDER — INSULIN LISPRO 100 [IU]/ML
0-4 INJECTION, SOLUTION INTRAVENOUS; SUBCUTANEOUS NIGHTLY
Status: DISCONTINUED | OUTPATIENT
Start: 2023-01-28 | End: 2023-01-30 | Stop reason: HOSPADM

## 2023-01-28 RX ADMIN — INSULIN LISPRO 2 UNITS: 100 INJECTION, SOLUTION INTRAVENOUS; SUBCUTANEOUS at 13:28

## 2023-01-28 RX ADMIN — SODIUM CHLORIDE: 9 INJECTION, SOLUTION INTRAVENOUS at 09:29

## 2023-01-28 RX ADMIN — HEPARIN SODIUM 5000 UNITS: 5000 INJECTION INTRAVENOUS; SUBCUTANEOUS at 21:49

## 2023-01-28 RX ADMIN — HEPARIN SODIUM 5000 UNITS: 5000 INJECTION INTRAVENOUS; SUBCUTANEOUS at 06:13

## 2023-01-28 RX ADMIN — SODIUM CHLORIDE 2000 MG: 9 INJECTION, SOLUTION INTRAVENOUS at 00:26

## 2023-01-28 RX ADMIN — HEPARIN SODIUM 5000 UNITS: 5000 INJECTION INTRAVENOUS; SUBCUTANEOUS at 13:28

## 2023-01-28 RX ADMIN — SODIUM CHLORIDE 2000 MG: 9 INJECTION, SOLUTION INTRAVENOUS at 14:35

## 2023-01-28 RX ADMIN — ATENOLOL 100 MG: 50 TABLET ORAL at 09:26

## 2023-01-28 RX ADMIN — VANCOMYCIN HYDROCHLORIDE 2500 MG: 5 INJECTION, POWDER, LYOPHILIZED, FOR SOLUTION INTRAVENOUS at 09:30

## 2023-01-28 RX ADMIN — GLIPIZIDE 5 MG: 5 TABLET ORAL at 06:13

## 2023-01-28 RX ADMIN — ATORVASTATIN CALCIUM 10 MG: 10 TABLET, FILM COATED ORAL at 21:49

## 2023-01-28 RX ADMIN — LOPERAMIDE HYDROCHLORIDE 2 MG: 2 CAPSULE ORAL at 15:22

## 2023-01-28 RX ADMIN — SODIUM CHLORIDE: 9 INJECTION, SOLUTION INTRAVENOUS at 23:46

## 2023-01-28 RX ADMIN — SODIUM CHLORIDE 2000 MG: 9 INJECTION, SOLUTION INTRAVENOUS at 21:54

## 2023-01-28 RX ADMIN — POTASSIUM CHLORIDE 40 MEQ: 1500 TABLET, EXTENDED RELEASE ORAL at 09:26

## 2023-01-28 NOTE — PROGRESS NOTES
[facility] Pharmacokinetic Monitoring Service - Vancomycin     Venkata Newton is a 67 y.o. male starting on vancomycin therapy for cellulitis . Pharmacy consulted by Dr. Randi Bedolla  for monitoring and adjustment. Target Concentration: Goal trough of 10-15 mg/L and AUC/DONNA <500 mg*hr/L    Additional Antimicrobials: none    Pertinent Laboratory Values: Wt Readings from Last 1 Encounters:   01/28/23 287 lb 11.2 oz (130.5 kg)     Temp Readings from Last 1 Encounters:   01/28/23 98.4 °F (36.9 °C)     Estimated Creatinine Clearance: 76 mL/min (A) (based on SCr of 1.22 mg/dL (H)). Recent Labs     01/27/23  1356 01/28/23  0620   CREATININE 1.26* 1.22*   WBC 18.6* 11.8*         Pertinent Cultures:  Culture Date Source Results   1/27 Blood x 2 NG x 2 (12 hrs)   MRSA Nasal Swab: N/A. Non-respiratory infection.     Plan:  Dosing recommendations based on Bayesian software  Start vancomycin 2500mg x 1 loading dose, then 1750mg IV q 24 hours  Anticipated AUC of 444 and trough concentration of 13 ug/ml  at steady state  Renal labs as indicated   Vancomycin concentration ordered for 1/29 @ 1800    Pharmacy will continue to monitor patient and adjust therapy as indicated    Thank you for the consult,  Shari Thompson, [address] [facility]  1/28/2023 8:45 AM Yes

## 2023-01-28 NOTE — PLAN OF CARE
Problem: Skin/Tissue Integrity - Adult  Goal: Skin integrity remains intact  1/28/2023 1314 by Theodore Olivarez, RN  Outcome: Progressing  Flowsheets (Taken 1/28/2023 1314)  Skin Integrity Remains Intact:   Monitor for areas of redness and/or skin breakdown   Assess vascular access sites hourly  1/28/2023 0320 by Danni Will RN  Outcome: Progressing

## 2023-01-28 NOTE — CONSULTS
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  Peripheral arterial disease  Obesity    Recommendations   The clinical picture is consistent with streptococcal infection  I will narrow the antimicrobial therapy to cefazolin alone and stop the vancomycin  Was discussed with the patient, wife, and daughters at bedside and all of their questions were answered  I will follow his clinical progress and adjust antimicrobial therapy accordingly  Will likely be discharged on oral antimicrobial therapy in the next 24 to 48 hours    Chief complaint/reason for consultation:   Right lower extremity cellulitis    History of Present Illness:   Silvia Leonard is a 67y.o.-year-old male who was initially admitted on 1/27/2023. Joyce Alcazar has a history of diabetes mellitus type 2 with associated neuropathy, peripheral arterial disease, hypertension, hyperlipidemia, some venous stasis dermatitis and he was in his usual state of health until earlier in the week when the wife seemed to notice that he had an abnormal gait. The patient has had a couple of scabs in his legs and it was then subsequently noted when he was in some shorts that his leg was red swollen and he had drainage from a previously scabbed area  The patient was seen in the emergency department was diagnosed with cellulitis. White blood cell count is elevated at 18.6, CRP elevated at 256.3    The patient was admitted started on IV antimicrobial therapy and I was asked to evaluate and help with antibiotic choice.     I have personally reviewed the past medical history, past surgical history, medications, social history, and family history, and I have updated the database accordingly.   Past Medical History:     Past Medical History:   Diagnosis Date    Atherosclerosis of aortic bifurcation and common iliac arteries ([facility] 2018    H/O cardiac catheterization 2003    Hyperlipidemia     on med    Hypertension     on med    Skin cancer     Type 2 diabetes mellitus ([facility]      Past Surgical  History:     Past Surgical History:   Procedure Laterality Date    CARDIAC CATHETERIZATION  several    COLONOSCOPY      INGUINAL HERNIA REPAIR Right     PENIS SURGERY      SKIN CANCER EXCISION      TIBIA FRACTURE SURGERY Left     UPPER GASTROINTESTINAL ENDOSCOPY       Medications:      vancomycin (VANCOCIN) intermittent dosing (placeholder)   Other RX Placeholder    vancomycin  2,500 mg IntraVENous Once    [START ON 2023] vancomycin  1,750 mg IntraVENous Q24H    atorvastatin  10 mg Oral Nightly    atenolol  100 mg Oral Daily    aspirin  81 mg Oral Daily    sodium chloride flush  5-40 mL IntraVENous 2 times per day    heparin (porcine)  5,000 Units SubCUTAneous 3 times per day     Social History:     Social History     Socioeconomic History    Marital status:      Spouse name: Not on file    Number of children: Not on file    Years of education: Not on file    Highest education level: Not on file   Occupational History    Not on file   Tobacco Use    Smoking status: Former     Packs/day: 1.00     Years: 20.00     Pack years: 20.00     Types: Cigarettes     Quit date: 1980     Years since quittin.0    Smokeless tobacco: Never   Vaping Use    Vaping Use: Unknown   Substance and Sexual Activity    Alcohol use: No    Drug use: No    Sexual activity: Not on file   Other Topics Concern    Not on file   Social History Narrative    Not on file     Social Determinants of Health     Financial Resource Strain: Low Risk     Difficulty of Paying Living Expenses: Not hard at all   Food Insecurity: No Food Insecurity    Worried About Running Out of Food in the Last Year: Never true    Ran Out of Food in the Last Year: Never true   Transportation Needs: Not on file   Physical Activity: Inactive    Days of Exercise per Week: 0 days    Minutes of Exercise per Session: 10 min   Stress: Not on file   Social Connections: Not on file   Intimate Partner Violence: Not on file   Housing Stability: Not on file     Family History:     Family History   Problem Relation Age of Onset    Kidney Cancer Father     COPD Mother     Other Mother         aneurysm    No Known Problems Sister       Allergies:   Patient has no known allergies. Review of Systems:   General: No fevers or chills. Eyes: No double vision or blurry vision. ENT: No sore throat or runny nose. Cardiovascular: No chest pain or palpitations. Lung: No shortness of breath or cough. Abdomen: No nausea, vomiting, diarrhea, or abdominal pain. Genitourinary: No increased urinary frequency, or dysuria. Musculoskeletal:  Redness swelling in the right lower extremity with a draining wound  Hematologic: No bleeding or bruising. Neurologic: No headache, weakness, numbness, or tingling. Physical Examination :   /67   Pulse 68   Temp 98.4 °F (36.9 °C)   Resp 16   Ht 6' (1.829 m)   Wt 287 lb 11.2 oz (130.5 kg)   SpO2 96%   BMI 39.02 kg/m²     Temperature Range: Temp: 98.4 °F (36.9 °C) Temp  Av.2 °F (37.3 °C)  Min: 98.4 °F (36.9 °C)  Max: 99.7 °F (37.6 °C)  General Appearance: Awake, alert, and in no apparent distress  Head: Normocephalic, without obvious abnormality, atraumatic  Eyes: Pupils equal, round, reactive, to light and accommodation; extraocular movements intact; sclera anicteric; conjunctivae pink  ENT: Oropharynx clear, without erythema, exudate, or thrush. Neck: Supple, without lymphadenopathy.    Pulmonary/Chest: Clear to auscultation, without wheezes, rales, or rhonchi  Cardiovascular: normal rate, regular rhythm, normal S1 and S2, no murmurs, rubs, clicks or gallops, distal pulses intact, no carotid bruits   Abdomen: Obese abdomen positive bowel sounds in all 4 quadrants and Soft, nontender, nondistended. Extremities: venous stasis dermatosis noted and there is erythema and swelling noted in the right lower extremity especially in the leg and the lateral leg there is a draining wound                Neurologic: No gross sensory or motor deficits. Skin: There is some erythema in the right lower extremity as depicted in the pictures above with a right lateral leg wound that is draining well is a scabbed lesion in the left anterior leg    Medical Decision Making:   I have independently reviewed/ordered the following labs:  CBC with Differential:   Recent Labs     01/27/23  1356 01/28/23  0620   WBC 18.6* 11.8*   HGB 13.9 12.5*   HCT 41.8 38.1*    159   LYMPHOPCT 6* 10*   MONOPCT 5 7     BMP:   Recent Labs     01/27/23  1356 01/28/23  0620   * 138   K 3.5* 3.3*   CL 98 103   CO2 22 25   BUN 27* 26*   CREATININE 1.26* 1.22*   MG  --  1.8     Hepatic Function Panel: No results for input(s): PROT, LABALBU, BILIDIR, IBILI, BILITOT, ALKPHOS, ALT, AST in the last 72 hours. No results found for: PROCAL    Lab Results   Component Value Date/Time    .3 01/27/2023 01:56 PM    CRP 1.7 12/07/2018 12:00 PM     No results found for: FERRITIN  No results found for: FIBRINOGEN  Lab Results   Component Value Date/Time    DDIMER 1.11 01/27/2023 01:56 PM     No results found for: LDH    Lab Results   Component Value Date    SEDRATE 22 (H) 01/27/2023       Lab Results   Component Value Date/Time    COVID19 POSITIVE 03/01/2022 08:35 AM     No results found for requested labs within last 30 days.        Imaging Studies:   VL DUP LOWER EXTREMITY VENOUS RIGHT    Result Date: 1/28/2023    [facility]  Vascular Lower Extremities DVT Study Procedure   Patient Name    [address]      Date of Study             01/27/2023                  Gavino Rodgers   Date of Birth   1950   Gender                    Male   Age 67 year(s)   Race                         Room Number     2016   Corporate ID #  [id number]   Patient Acct #  [de-identified]   MR #            [id number]      Klaus Daly   Accession #     [id number]   Interpreting Physician    Toby Doan   Referring Nurse              Referring Physician  Practitioner  Procedure Type of Study:   Veins: Lower Extremities DVT Study, Venous Scan Lower Right. Indications for Study:Cellulitis and Elevated D-Dimer. Patient Status:ER. Technical Quality:Adequate visualization. Conclusions   Summary   Technically limited visualization. No evidence of superficial or deep venous thrombosis in the right lower  extremity. Signature   ----------------------------------------------------------------  Electronically signed by Colby Sosa(Sonographer) on  01/27/2023 03:00 PM  ----------------------------------------------------------------   ----------------------------------------------------------------  Electronically signed by Toby Doan(Interpreting physician)  on 01/28/2023 12:56 AM  ----------------------------------------------------------------  Findings:   Right Impression:  The common femoral, femoral, popliteal and tibial veins demonstrate  normal compressibility and augmentation. Limited visualization of the posterior tibial and peroneal veins. Ankle and calf edema noted. Velocities are measured in cm/s ; Diameters are measured in cm Right Lower Extremities DVT Study Measurements Right 2D Measurements +------------------------------------+----------+---------------+----------+ ! Location                            ! Visualized! Compressibility! Thrombosis! +------------------------------------+----------+---------------+----------+ ! Common Femoral                      !Yes       ! Yes            ! None      ! +------------------------------------+----------+---------------+----------+ ! Prox Femoral                        !Yes       ! Yes !None      ! +------------------------------------+----------+---------------+----------+ ! Mid Femoral                         !Yes       ! Yes            ! None      ! +------------------------------------+----------+---------------+----------+ ! Dist Femoral                        !Yes       ! Yes            ! None      ! +------------------------------------+----------+---------------+----------+ ! Deep Femoral                        !Yes       ! Yes            ! None      ! +------------------------------------+----------+---------------+----------+ ! Popliteal                           !Yes       ! Yes            ! None      ! +------------------------------------+----------+---------------+----------+ ! Sapheno Femoral Junction            ! Yes       ! Yes            ! None      ! +------------------------------------+----------+---------------+----------+ ! PTV                                 ! Yes       ! Partial        !None      ! +------------------------------------+----------+---------------+----------+ ! Peroneal                            !Yes       ! Partial        !None      ! +------------------------------------+----------+---------------+----------+ ! Gastroc                             ! Yes       ! Yes            ! None      ! +------------------------------------+----------+---------------+----------+ ! GSV Thigh                           ! Yes       ! Yes            ! None      ! +------------------------------------+----------+---------------+----------+ ! GSV Knee                            ! Yes       ! Yes            ! None      ! +------------------------------------+----------+---------------+----------+ ! GSV Ankle                           ! Yes       ! Yes            ! None      ! +------------------------------------+----------+---------------+----------+ ! SSV                                 ! Yes       ! Yes            ! None      ! +------------------------------------+----------+---------------+----------+ Right Doppler Measurements +---------------------------+------+------+--------------------------------+ ! Location                   ! Signal!Reflux! Reflux (msec)                   ! +---------------------------+------+------+--------------------------------+ ! Common Femoral             !Phasic! No    !                                ! +---------------------------+------+------+--------------------------------+ ! Prox Femoral               !Phasic! No    !                                ! +---------------------------+------+------+--------------------------------+ ! Popliteal                  !Phasic! No    !                                ! +---------------------------+------+------+--------------------------------+      Cultures:     Culture, Anaerobic and Aerobic [[id number]] (Abnormal) Collected: 01/27/23 1340   Order Status: Completed Specimen: Leg Updated: 01/28/23 4942    Specimen Description . LEG RT LOWER LATERAL    Direct Exam RARE NEUTROPHILS Abnormal      MODERATE GRAM POSITIVE COCCI IN PAIRS Abnormal     Culture STREPTOCOCCUS PYOGENES (GROUP A) MODERATE GROWTH Abnormal    Culture, Blood 1 [[id number]] Collected: 01/27/23 1558   Order Status: Completed Specimen: Blood Updated: 01/28/23 0531    Specimen Description . BLOOD    Special Requests RT AC 20 ML    Culture NO GROWTH 12 HOURS   Culture, Blood 1 [[id number]] Collected: 01/27/23 1545   Order Status: Completed Specimen: Blood Updated: 01/28/23 0527    Specimen Description . BLOOD    Special Requests lt ac 20ml    Culture NO GROWTH 12 HOURS       Electronically signed by Fede Huber MD on 1/28/2023 at 10:03 AM      [facility]  Fede Huber MD  [employer] messaging  OFFICE: [phone]    Thank you for allowing us to participate in the care of this patient. Please call with questions.      This note is created with the assistance of a speech recognition program.  While intending to generate a document that actually reflects the content of the visit, the document can still have some errors including those of syntax and sound a like substitutions which may escape proof reading. In such instances, actual meaning can be extrapolated by contextual diversion.

## 2023-01-28 NOTE — PROGRESS NOTES
Admitted to 2016 from ED per wheelchair. Admission hx gathered. INT intact to rt [facility]. Site unremarkable. Last bm was yesterday. Denies issues with voiding. Pt states he is not to take any aspirin from now until after he has a cystoscopy for a kidney stone in a couple of weeks. Right lower leg scattered rash like on lower leg. Swelling present in right leg only. Pt states he can feel the touch of someone but that is it to both legs. Rt lateral calf area has 1cm size opening to dermis , center is pink , weeping yellowish fluid. Non adhearing dressing and kerlix applied. Left anterior leg has approx 1cm bloody scab. Vitals obtained. Call light in reach. Bed locked and in low position. SR up x1. Non skid slippers given to pt and explained need to use instead of his socks for safety purposes. Acknowledged understanding. Pt on coming nurse informed of above and need for head to toe.

## 2023-01-28 NOTE — PLAN OF CARE
Problem: Discharge Planning  Goal: Discharge to home or other facility with appropriate resources  Outcome: Progressing     Problem: Skin/Tissue Integrity - Adult  Goal: Skin integrity remains intact  Outcome: Progressing     Problem: Skin/Tissue Integrity - Adult  Goal: Incisions, wounds, or drain sites healing without S/S of infection  Outcome: Progressing     Problem: Musculoskeletal - Adult  Goal: Return mobility to safest level of function  Outcome: Progressing

## 2023-01-28 NOTE — PROGRESS NOTES
[facility]  Office: [address], DO, Delfinpower Nanci, DO, Riya Mills, DO, Alejandro Ron Blood, DO, Binta Brown MD, Stephy Olson MD, Nima Monroe MD, Shayla Richards MD,  Claudette Schuler MD, Clay Burnette MD, Rogerio Person, DO, Torrie Felipe MD,  Bishop Gilmore MD, Mir Kumar MD, Sarah Baca, DO, Emmanuel Munson MD, Chandler Lombard, MD, Johnna Fowler, DO, Lamar Murphy MD, Rodrigo New MD, Nereida Mayers MD, Luther Hightower MD, Rhett Galvez DO, Berkley Garcia MD, Jaren Griffith MD, Bhumika Henao, Fernand Nyhan, CNP, Anali Horan, CNP, Elvia Newton, CNP,  José Miguel Shaikh, DNP, Riley Everett, CNP, Adebayo Sykes, CNP, Sulaiman Chao, CNP, José Miguel Mancera, CNP, Avery Waller, CNP, Gonzalez Lynch PA-C, Cira Strauss, CNS, Jackie Montes, LUIS, Adrienne Palacios, Marte [facility]    Progress Note    1/28/2023    3:59 PM    Name:   Bailey White  MRN:     [id number]     Acct:      [de-identified]   Room:   2016/2016-02  IP Day:  1  Admit Date:  1/27/2023  1:27 PM    PCP:   RILEY Bertrand CNP  Code Status:  Full Code    Subjective:     C/C:   Chief Complaint   Patient presents with    Leg Pain     Pt states he noticed he had cellulitis this morning on his Rt leg. Interval History Status: not changed. Patient states his lower extremities unchanged from yesterday, no issues overnight. Patient complaining of decree sensations of lower extremities and diarrhea. Discussed ongoing plan    Brief History:     77-year-old male presents to the emergency department for right lower extremity redness and swelling. Patient was previously seen for left lower extremity redness similarly which was treated with antibiotics. Patient states that this time it started with a scab that started on his shin. Patient was started on antibiotics and infectious disease was consulted.   Patient also complaining of lower extremity weakness has been ongoing for many months, patient has a history of diabetes and currently does not admit to weakness, back pain, radiating pain and is not worsening quickly. Patient also complains of 2-3 episodes of diarrhea daily that has been ongoing for many weeks. Patient has been taking over-the-counter medications for diarrhea    Review of Systems:     Constitutional:  negative for chills, fevers, sweats  Respiratory:  negative for cough, dyspnea on exertion, shortness of breath, wheezing  Cardiovascular:  negative for chest pain, chest pressure/discomfort, lower extremity edema, palpitations  Gastrointestinal:  negative for abdominal pain, constipation, diarrhea, nausea, vomiting  Neurological:  negative for dizziness, headache    Medications: Allergies:  No Known Allergies    Current Meds:   Scheduled Meds:    insulin lispro  0-8 Units SubCUTAneous TID WC    insulin lispro  0-4 Units SubCUTAneous Nightly    ceFAZolin  2,000 mg IntraVENous Q8H    atorvastatin  10 mg Oral Nightly    atenolol  100 mg Oral Daily    aspirin  81 mg Oral Daily    sodium chloride flush  5-40 mL IntraVENous 2 times per day    heparin (porcine)  5,000 Units SubCUTAneous 3 times per day     Continuous Infusions:    sodium chloride 100 mL/hr at 01/28/23 1434    sodium chloride      dextrose       PRN Meds: loperamide, sodium chloride flush, sodium chloride, potassium chloride **OR** potassium alternative oral replacement **OR** potassium chloride, magnesium sulfate, ondansetron **OR** ondansetron, polyethylene glycol, acetaminophen **OR** acetaminophen, glucose, dextrose bolus **OR** dextrose bolus, glucagon (rDNA), dextrose    Data:     Past Medical History:   has a past medical history of Atherosclerosis of aortic bifurcation and common iliac arteries ([facility] [address] H/O cardiac catheterization, Hyperlipidemia, Hypertension, Skin cancer, and Type 2 diabetes mellitus ([facility] [address]     Social History:   reports that he quit smoking about 43 years ago. His smoking use included cigarettes. He has a 20.00 pack-year smoking history. He has never used smokeless tobacco. He reports that he does not drink alcohol and does not use drugs. Family History:   Family History   Problem Relation Age of Onset    Kidney Cancer Father     COPD Mother     Other Mother         aneurysm    No Known Problems Sister        Vitals:  /67   Pulse 68   Temp 98.4 °F (36.9 °C)   Resp 16   Ht 6' (1.829 m)   Wt 287 lb 11.2 oz (130.5 kg)   SpO2 96%   BMI 39.02 kg/m²   Temp (24hrs), Av.1 °F (37.3 °C), Min:98.4 °F (36.9 °C), Max:99.7 °F (37.6 °C)    Recent Labs     23  1109   POCGLU 180* 214*       I/O (24Hr): Intake/Output Summary (Last 24 hours) at 2023 1559  Last data filed at 2023 1230  Gross per 24 hour   Intake 529.99 ml   Output --   Net 529.99 ml       Labs:  Hematology:  Recent Labs     23  1356 23  0620   WBC 18.6* 11.8*   RBC 4.37 3.94*   HGB 13.9 12.5*   HCT 41.8 38.1*   MCV 95.7 96.7   MCH 31.8 31.7   MCHC 33.3 32.8   RDW 13.8 13.9    159   MPV 11.5 11.6   SEDRATE 22*  --    .3*  --    DDIMER 1.11*  --      Chemistry:  Recent Labs     23  1356 23  0620   * 138   K 3.5* 3.3*   CL 98 103   CO2 22 25   GLUCOSE 205* 168*   BUN 27* 26*   CREATININE 1.26* 1.22*   MG  --  1.8   ANIONGAP 13 10   LABGLOM >60 >60   CALCIUM 9.2 8.6     Recent Labs     23  1109   POCGLU 180* 214*     ABG:No results found for: POCPH, PHART, PH, POCPCO2, YPJ2KRP, PCO2, POCPO2, PO2ART, PO2, POCHCO3, CTA9JWP, HCO3, NBEA, PBEA, BEART, BE, THGBART, THB, KBM1SBI, YWBE0SAL, U7GNRQYX, O2SAT, FIO2  Lab Results   Component Value Date/Time    SPECIAL RT AC 20 ML 2023 03:58 PM     Lab Results   Component Value Date/Time    CULTURE NO GROWTH 12 HOURS 2023 03:58 PM       Radiology:  No results found.     Physical Examination:        General appearance:  alert, cooperative and no distress  Mental Status:  oriented to person, place and time and normal affect  Lungs:  clear to auscultation bilaterally, normal effort  Heart:  regular rate and rhythm, no murmur  Abdomen:  soft, nontender, nondistended, normal bowel sounds, no masses, hepatomegaly, splenomegaly  Extremities:  no edema, redness, tenderness in the calves  Skin:  no gross lesions, rashes, induration    Assessment:        Hospital Problems             Last Modified POA    * (Principal) Cellulitis 1/27/2023 Yes    SARA (acute kidney injury) ([facility] [address] 1/27/2023 Yes    Mixed hyperlipidemia 1/27/2023 Yes    Essential hypertension 1/27/2023 Yes    Type 2 diabetes mellitus without complication, without long-term current use of insulin ([facility] [address] 1/27/2023 Yes       Plan:        Cellulitis   Infectious disease started patient on Ancef, will continue to monitor  Nephrolithiasis   Patient has appointment on the ninth for lithotripsy, followed outpatient by [address] [place] [address] urology, patient has an SARA, check CT abdomen pelvis without contrast for hydronephrosis and progression of kidney stone  SARA  See #2  Diarrhea   Start imodium  Neuropathy   Suspect progressive bilateral diabetic neuropathy, has been going long-term.   No back pain, no weakness, patient has poor wound healing  HLD  Essential Hypertension   Type 2 Diabetes  Start sliding scale    Vicki Sky DO  1/28/2023  3:59 PM

## 2023-01-29 PROBLEM — L03.115 CELLULITIS OF RIGHT LOWER EXTREMITY WITHOUT FOOT: Status: ACTIVE | Noted: 2023-01-29

## 2023-01-29 LAB
ALBUMIN SERPL-MCNC: 3.1 G/DL (ref 3.5–5.2)
ANION GAP SERPL CALCULATED.3IONS-SCNC: 4 MMOL/L (ref 9–17)
BUN BLDV-MCNC: 20 MG/DL (ref 8–23)
BUN/CREAT BLD: 21 (ref 9–20)
CALCIUM SERPL-MCNC: 8.6 MG/DL (ref 8.6–10.4)
CHLORIDE BLD-SCNC: 104 MMOL/L (ref 98–107)
CO2: 29 MMOL/L (ref 20–31)
CREAT SERPL-MCNC: 0.95 MG/DL (ref 0.7–1.2)
GFR SERPL CREATININE-BSD FRML MDRD: >60 ML/MIN/1.73M2
GLUCOSE BLD-MCNC: 155 MG/DL (ref 70–99)
GLUCOSE BLD-MCNC: 163 MG/DL (ref 75–110)
GLUCOSE BLD-MCNC: 163 MG/DL (ref 75–110)
GLUCOSE BLD-MCNC: 182 MG/DL (ref 75–110)
GLUCOSE BLD-MCNC: 205 MG/DL (ref 75–110)
HCT VFR BLD CALC: 37.4 % (ref 40.7–50.3)
HEMOGLOBIN: 12.2 G/DL (ref 13–17)
MAGNESIUM: 1.8 MG/DL (ref 1.6–2.6)
MCH RBC QN AUTO: 31.8 PG (ref 25.2–33.5)
MCHC RBC AUTO-ENTMCNC: 32.6 G/DL (ref 28.4–34.8)
MCV RBC AUTO: 97.4 FL (ref 82.6–102.9)
NRBC AUTOMATED: 0 PER 100 WBC
PDW BLD-RTO: 13.5 % (ref 11.8–14.4)
PHOSPHORUS: 2.7 MG/DL (ref 2.5–4.5)
PLATELET # BLD: 141 K/UL (ref 138–453)
PMV BLD AUTO: 11.4 FL (ref 8.1–13.5)
POTASSIUM SERPL-SCNC: 3.8 MMOL/L (ref 3.7–5.3)
RBC # BLD: 3.84 M/UL (ref 4.21–5.77)
SODIUM BLD-SCNC: 137 MMOL/L (ref 135–144)
WBC # BLD: 8.2 K/UL (ref 3.5–11.3)

## 2023-01-29 PROCEDURE — 6370000000 HC RX 637 (ALT 250 FOR IP): Performed by: INTERNAL MEDICINE

## 2023-01-29 PROCEDURE — 99232 SBSQ HOSP IP/OBS MODERATE 35: CPT | Performed by: INTERNAL MEDICINE

## 2023-01-29 PROCEDURE — 6370000000 HC RX 637 (ALT 250 FOR IP): Performed by: NURSE PRACTITIONER

## 2023-01-29 PROCEDURE — 1200000000 HC SEMI PRIVATE

## 2023-01-29 PROCEDURE — 6360000002 HC RX W HCPCS: Performed by: INTERNAL MEDICINE

## 2023-01-29 PROCEDURE — 85027 COMPLETE CBC AUTOMATED: CPT

## 2023-01-29 PROCEDURE — 82947 ASSAY GLUCOSE BLOOD QUANT: CPT

## 2023-01-29 PROCEDURE — 36415 COLL VENOUS BLD VENIPUNCTURE: CPT

## 2023-01-29 PROCEDURE — 2580000003 HC RX 258: Performed by: INTERNAL MEDICINE

## 2023-01-29 PROCEDURE — 6360000002 HC RX W HCPCS: Performed by: NURSE PRACTITIONER

## 2023-01-29 PROCEDURE — 80069 RENAL FUNCTION PANEL: CPT

## 2023-01-29 PROCEDURE — 83735 ASSAY OF MAGNESIUM: CPT

## 2023-01-29 RX ORDER — LOPERAMIDE HYDROCHLORIDE 2 MG/1
2 CAPSULE ORAL 3 TIMES DAILY
Status: DISCONTINUED | OUTPATIENT
Start: 2023-01-29 | End: 2023-01-30 | Stop reason: HOSPADM

## 2023-01-29 RX ORDER — LANOLIN ALCOHOL/MO/W.PET/CERES
3 CREAM (GRAM) TOPICAL ONCE
Status: COMPLETED | OUTPATIENT
Start: 2023-01-29 | End: 2023-01-29

## 2023-01-29 RX ADMIN — LOPERAMIDE HYDROCHLORIDE 2 MG: 2 CAPSULE ORAL at 20:39

## 2023-01-29 RX ADMIN — SODIUM CHLORIDE 2000 MG: 9 INJECTION, SOLUTION INTRAVENOUS at 06:04

## 2023-01-29 RX ADMIN — HEPARIN SODIUM 5000 UNITS: 5000 INJECTION INTRAVENOUS; SUBCUTANEOUS at 06:03

## 2023-01-29 RX ADMIN — HEPARIN SODIUM 5000 UNITS: 5000 INJECTION INTRAVENOUS; SUBCUTANEOUS at 20:39

## 2023-01-29 RX ADMIN — Medication 3 MG: at 21:21

## 2023-01-29 RX ADMIN — SODIUM CHLORIDE 2000 MG: 9 INJECTION, SOLUTION INTRAVENOUS at 14:41

## 2023-01-29 RX ADMIN — LOPERAMIDE HYDROCHLORIDE 2 MG: 2 CAPSULE ORAL at 12:11

## 2023-01-29 RX ADMIN — SODIUM CHLORIDE 2000 MG: 9 INJECTION, SOLUTION INTRAVENOUS at 21:22

## 2023-01-29 RX ADMIN — HEPARIN SODIUM 5000 UNITS: 5000 INJECTION INTRAVENOUS; SUBCUTANEOUS at 14:37

## 2023-01-29 RX ADMIN — LOPERAMIDE HYDROCHLORIDE 2 MG: 2 CAPSULE ORAL at 14:39

## 2023-01-29 RX ADMIN — ATORVASTATIN CALCIUM 10 MG: 10 TABLET, FILM COATED ORAL at 20:39

## 2023-01-29 RX ADMIN — ATENOLOL 100 MG: 50 TABLET ORAL at 09:06

## 2023-01-29 ASSESSMENT — ENCOUNTER SYMPTOMS
COLOR CHANGE: 1
GASTROINTESTINAL NEGATIVE: 1
RESPIRATORY NEGATIVE: 1

## 2023-01-29 NOTE — PLAN OF CARE
Problem: Discharge Planning  Goal: Discharge to home or other facility with appropriate resources  Outcome: Progressing     Problem: Skin/Tissue Integrity - Adult  Goal: Skin integrity remains intact  1/28/2023 2328 by Jayme Humphreys RN  Outcome: Progressing  1/28/2023 1314 by Yany Farias RN  Outcome: Progressing  Flowsheets (Taken 1/28/2023 1314)  Skin Integrity Remains Intact:   Monitor for areas of redness and/or skin breakdown   Assess vascular access sites hourly  Goal: Incisions, wounds, or drain sites healing without S/S of infection  Outcome: Progressing     Problem: Musculoskeletal - Adult  Goal: Return mobility to safest level of function  Outcome: Progressing     Problem: Chronic Conditions and Co-morbidities  Goal: Patient's chronic conditions and co-morbidity symptoms are monitored and maintained or improved  Outcome: Progressing

## 2023-01-29 NOTE — PROGRESS NOTES
[facility]  Progress Note    Tanya Jessica  MRN: [id number]  Date: 1/29/2023  LOS: 2     Reason for F/U :   Right lower extremity cellulitis    Impression :   Right lower extremity cellulitis in a patient with underlying venous stasis dermatitis  Right lateral leg wound currently draining unclear if this was the nidus of the infection  Culture with Streptococcus pyogenes  Diabetes mellitus type 2 with associated neuropathy  Peripheral arterial disease  Obesity    Recommendations:   Continue intravenous antimicrobial therapy with cefazolin  The cellulitic changes are overall improved  The plan will be to continue IV antibiotic therapy today and if the patient continues to make good clinical progress the plan will be to discharge him tomorrow on oral antibiotic therapy    Infection Control Recommendations:   Universal precautions    Discharge Planning:   Estimated Length of IV antimicrobials: While hospitalized  Patient will need Midline Catheter Insertion/ PICC line Insertion: No  Patient will need: Home IV , [place],  SNF,  LTAC: Undetermined  Patient willneed outpatient wound care: No    Medical Decision making / Summary of Stay:   Tanya Jessica is a 67y.o.-year-old male who was initially admitted on 1/27/2023. Jason Cardona has a history of diabetes mellitus type 2 with associated neuropathy, peripheral arterial disease, hypertension, hyperlipidemia, some venous stasis dermatitis and he was in his usual state of health until earlier in the week when the wife seemed to notice that he had an abnormal gait. The patient has had a couple of scabs in his legs and it was then subsequently noted when he was in some shorts that his leg was red swollen and he had drainage from a previously scabbed area  The patient was seen in the emergency department was diagnosed with cellulitis.   White blood cell count is elevated at 18.6, CRP elevated at 256.3    The patient was admitted started on IV antimicrobial therapy and I was asked to evaluate and help with antibiotic choice. Current evaluation:2023    /74   Pulse 61   Temp 97.3 °F (36.3 °C) (Oral)   Resp 18   Ht 6' (1.829 m)   Wt 288 lb (130.6 kg)   SpO2 97%   BMI 39.06 kg/m²     Temperature Range: Temp: 97.3 °F (36.3 °C) Temp  Av.5 °F (36.4 °C)  Min: 97.3 °F (36.3 °C)  Max: 97.7 °F (36.5 °C)  The patient is seen and evaluated at bedside he is awake and alert in no acute distress. Is sitting up at the table at bedside and he is sitting there with his wife. He does not report any subjective fevers or chills. No cough or shortness of breath. Abdominal pain nausea vomiting or diarrhea. No chest pains or palpitations. Review of Systems   Constitutional: Negative. HENT: Negative. Respiratory: Negative. Cardiovascular: Negative. Gastrointestinal: Negative. Genitourinary: Negative. Musculoskeletal: Negative. Skin:  Positive for color change and wound. Neurological: Negative. Psychiatric/Behavioral: Negative. Physical Examination :     Physical Exam  Constitutional:       Appearance: He is well-developed. He is obese. HENT:      Head: Normocephalic and atraumatic. Cardiovascular:      Rate and Rhythm: Normal rate. Heart sounds: Normal heart sounds. No friction rub. No gallop. Pulmonary:      Effort: Pulmonary effort is normal.      Breath sounds: Normal breath sounds. No wheezing. Abdominal:      General: Bowel sounds are normal.      Palpations: Abdomen is soft. There is no mass. Tenderness: There is no abdominal tenderness. Musculoskeletal:         General: Normal range of motion. Cervical back: Normal range of motion and neck supple. Lymphadenopathy:      Cervical: No cervical adenopathy. Skin:     General: Skin is warm and dry. Findings: Erythema present.       Comments: Venous stasis dermatitis skin changes in the lower extremities bilaterally Neurological:      Mental Status: He is alert and oriented to person, place, and time. Laboratory data:   I have independently reviewed the followinglabs:  CBC with Differential:   Recent Labs     01/27/23  1356 01/28/23  0620 01/29/23  0536   WBC 18.6* 11.8* 8.2   HGB 13.9 12.5* 12.2*   HCT 41.8 38.1* 37.4*    159 141   LYMPHOPCT 6* 10*  --    MONOPCT 5 7  --      BMP:   Recent Labs     01/28/23  0620 01/29/23  0536    137   K 3.3* 3.8    104   CO2 25 29   BUN 26* 20   CREATININE 1.22* 0.95   MG 1.8 1.8     Hepatic Function Panel:   Recent Labs     01/29/23  0536   LABALBU 3.1*         No results found for: PROCAL  Lab Results   Component Value Date/Time    .3 01/27/2023 01:56 PM    CRP 1.7 12/07/2018 12:00 PM     Lab Results   Component Value Date    SEDRATE 22 (H) 01/27/2023         Lab Results   Component Value Date/Time    DDIMER 1.11 01/27/2023 01:56 PM     No results found for: FERRITIN  No results found for: LDH  No results found for: FIBRINOGEN    No results found for requested labs within last 30 days. Lab Results   Component Value Date/Time    COVID19 POSITIVE 03/01/2022 08:35 AM       No results for input(s): VANCOTROUGH in the last 72 hours. Imaging Studies:   No new imaging    Cultures:     Culture, Anaerobic and Aerobic [[id number]] (Abnormal) Collected: 01/27/23 1340   Order Status: Completed Specimen: Leg Updated: 01/29/23 0956    Specimen Description . LEG RT LOWER LATERAL    Direct Exam RARE NEUTROPHILS Abnormal      MODERATE GRAM POSITIVE COCCI IN PAIRS Abnormal     Culture STREPTOCOCCUS PYOGENES (GROUP A) MODERATE GROWTH Abnormal      No anaerobic organisms isolated at 2 days. Culture, Blood 1 [[id number]] Collected: 01/27/23 1545   Order Status: Completed Specimen: Blood Updated: 01/29/23 0846    Specimen Description . BLOOD    Special Requests lt ac 20ml    Culture NO GROWTH 2 DAYS   Culture, Blood 1 [[id number]] Collected: 01/27/23 1558   Order Status: Completed Specimen: Blood Updated: 01/29/23 0852    Specimen Description . BLOOD    Special Requests RT AC 20 ML    Culture NO GROWTH 2 DAYS       Medications:      loperamide  2 mg Oral TID    insulin lispro  0-8 Units SubCUTAneous TID WC    insulin lispro  0-4 Units SubCUTAneous Nightly    ceFAZolin  2,000 mg IntraVENous Q8H    atorvastatin  10 mg Oral Nightly    atenolol  100 mg Oral Daily    aspirin  81 mg Oral Daily    sodium chloride flush  5-40 mL IntraVENous 2 times per day    heparin (porcine)  5,000 Units SubCUTAneous 3 times per day       Electronically signed by Wander Hardin MD on 1/29/2023 at 10:04 AM      [facility]  Wander Hardin MD  [employer] messaging  OFFICE: [phone]    Thank you for allowing us to participate in the care of this patient. Please call with questions. This note is created with the assistance of a speech recognition program.  While intending to generate a document that actually reflects the content of the visit, the document can still have some errors including those of syntax and sound a like substitutions which may escape proof reading. In such instances, actual meaning can be extrapolated by contextual diversion.

## 2023-01-29 NOTE — PROGRESS NOTES
[facility]  Office: [address], DO, Michael Victoria, DO, Analia Diaz, DO, Judah Mixon Blood, DO, Tad Ann MD, Augustine Loo MD, Ashu Cesar MD, Renold Galeazzi, MD,  Bradley Childs MD, Robbie Hubbard MD, Vicki Sky, DO, Clare Duenas MD,  Mahesh Ro MD, Freada Osgood, MD, Trav Jackson, DO, Derrek Alonso MD, Kelly Vásquez MD, Freddy Cameron, DO, Sulma Maguire MD, Etienne Dubon MD, Tj Santiago MD, Leodan Escalera MD, Pineda Clemnets, DO, Nicki Waddell MD, Doc Jain MD, Judy Leary, Mague Carroll CNP, Lavern Cadena, CNP, Sun Zamora, CNP,  Brittani Chambers, [facility], Shruthi Bello, CNP, Jenn Mcbride, CNP, Erin Kumar, CNP, Jennyfer Camp, CNP, Sharon Gallego, CNP, Tessa Oleary PA-C, Giovanni Abarca, TIKA, Eleni Chavez, CNP, Malena Johnson[facility]    Progress Note    1/29/2023    11:14 AM    Name:   Pam Davies  MRN:     [id number]     Acct:      [de-identified]   Room:   2016/2016-02  IP Day:  2  Admit Date:  1/27/2023  1:27 PM    PCP:   RILEY Mcgee CNP  Code Status:  Full Code    Subjective:     C/C:   Chief Complaint   Patient presents with    Leg Pain     Pt states he noticed he had cellulitis this morning on his Rt leg. Interval History Status: not changed. Patient had slight improvement and lower extremity swelling/cellulitis. Infectious disease change antibiotics to Ancef. We will continue to monitor until cleared by ID for discharge    Brief History:     66-year-old male presents to the emergency department for right lower extremity redness and swelling. Patient was previously seen for left lower extremity redness similarly which was treated with antibiotics. Patient states that this time it started with a scab that started on his shin. Patient was started on antibiotics and infectious disease was consulted.   Patient also complaining of lower extremity weakness has been ongoing for many months, patient has a history of diabetes and currently does not admit to weakness, back pain, radiating pain and is not worsening quickly. Patient also complains of 2-3 episodes of diarrhea daily that has been ongoing for many weeks. Patient has been taking over-the-counter medications for diarrhea    Review of Systems:     Constitutional:  negative for chills, fevers, sweats  Respiratory:  negative for cough, dyspnea on exertion, shortness of breath, wheezing  Cardiovascular:  negative for chest pain, chest pressure/discomfort, lower extremity edema, palpitations  Gastrointestinal:  negative for abdominal pain, constipation, diarrhea, nausea, vomiting  Neurological:  negative for dizziness, headache    Medications: Allergies:  No Known Allergies    Current Meds:   Scheduled Meds:    loperamide  2 mg Oral TID    insulin lispro  0-8 Units SubCUTAneous TID WC    insulin lispro  0-4 Units SubCUTAneous Nightly    ceFAZolin  2,000 mg IntraVENous Q8H    atorvastatin  10 mg Oral Nightly    atenolol  100 mg Oral Daily    aspirin  81 mg Oral Daily    sodium chloride flush  5-40 mL IntraVENous 2 times per day    heparin (porcine)  5,000 Units SubCUTAneous 3 times per day     Continuous Infusions:    sodium chloride      dextrose       PRN Meds: sodium chloride flush, sodium chloride, potassium chloride **OR** potassium alternative oral replacement **OR** potassium chloride, magnesium sulfate, ondansetron **OR** ondansetron, polyethylene glycol, acetaminophen **OR** acetaminophen, glucose, dextrose bolus **OR** dextrose bolus, glucagon (rDNA), dextrose    Data:     Past Medical History:   has a past medical history of Atherosclerosis of aortic bifurcation and common iliac arteries ([facility] [address] H/O cardiac catheterization, Hyperlipidemia, Hypertension, Skin cancer, and Type 2 diabetes mellitus ([facility] [address]     Social History:   reports that he quit smoking about 37 years ago. His smoking use included cigarettes. He has a 20.00 pack-year smoking history. He has never used smokeless tobacco. He reports that he does not drink alcohol and does not use drugs. Family History:   Family History   Problem Relation Age of Onset    Kidney Cancer Father     COPD Mother     Other Mother         aneurysm    No Known Problems Sister        Vitals:  /74   Pulse 61   Temp 97.3 °F (36.3 °C) (Oral)   Resp 18   Ht 6' (1.829 m)   Wt 288 lb (130.6 kg)   SpO2 97%   BMI 39.06 kg/m²   Temp (24hrs), Av.5 °F (36.4 °C), Min:97.3 °F (36.3 °C), Max:97.7 °F (36.5 °C)    Recent Labs     23  1109 23  1611 23  0616   POCGLU 214* 110 114* 163*         I/O (24Hr):     Intake/Output Summary (Last 24 hours) at 2023 1114  Last data filed at 2023 1734  Gross per 24 hour   Intake 1808.45 ml   Output --   Net 1808.45 ml         Labs:  Hematology:  Recent Labs     23  1356 23  0620 23  0536   WBC 18.6* 11.8* 8.2   RBC 4.37 3.94* 3.84*   HGB 13.9 12.5* 12.2*   HCT 41.8 38.1* 37.4*   MCV 95.7 96.7 97.4   MCH 31.8 31.7 31.8   MCHC 33.3 32.8 32.6   RDW 13.8 13.9 13.5    159 141   MPV 11.5 11.6 11.4   SEDRATE 22*  --   --    .3*  --   --    DDIMER 1.11*  --   --        Chemistry:  Recent Labs     23  1356 23  0623  0536   * 138 137   K 3.5* 3.3* 3.8   CL 98 103 104   CO2 22 25 29   GLUCOSE 205* 168* 155*   BUN 27* 26* 20   CREATININE 1.26* 1.22* 0.95   MG  --  1.8 1.8   ANIONGAP 13 10 4*   LABGLOM >60 >60 >60   CALCIUM 9.2 8.6 8.6   PHOS  --   --  2.7       Recent Labs     23  2104 23  1109 23  1611 23  0536 23  0616   LABALBU  --   --   --   --  3.1*  --    POCGLU 180* 214* 110 114*  --  163*       ABG:No results found for: POCPH, PHART, PH, POCPCO2, VPJ9PSK, PCO2, POCPO2, PO2ART, PO2, POCHCO3, OGZ8SVF, HCO3, NBEA, PBEA, BEART, BE, THGBART, THB, IWM5MIE, ORXW4SFH, J8AEJCAI, O2SAT, FIO2  Lab Results   Component Value Date/Time    SPECIAL RT AC 20 ML 01/27/2023 03:58 PM     Lab Results   Component Value Date/Time    CULTURE NO GROWTH 2 DAYS 01/27/2023 03:58 PM       Radiology:  No results found.     Physical Examination:        General appearance:  alert, cooperative and no distress  Mental Status:  oriented to person, place and time and normal affect  Lungs:  clear to auscultation bilaterally, normal effort  Heart:  regular rate and rhythm, no murmur  Abdomen:  soft, nontender, nondistended, normal bowel sounds, no masses, hepatomegaly, splenomegaly          Assessment:        Hospital Problems             Last Modified POA    * (Principal) Cellulitis 1/27/2023 Yes    SARA (acute kidney injury) ([facility] [address] 1/27/2023 Yes    Mixed hyperlipidemia 1/27/2023 Yes    Essential hypertension 1/27/2023 Yes    Type 2 diabetes mellitus without complication, without long-term current use of insulin ([facility] [address] 1/27/2023 Yes     Plan:        Cellulitis   Infectious disease started patient on Ancef, will continue to monitor  Nephrolithiasis   Patient has appointment on the ninth for lithotripsy, SARA resolved, no indication for procedure at this point, urology consulted recommending outpatient follow-up  SARA  Resolved  Diarrhea   Schedule Imodium, patient only took 1 dose yesterday, despite continued diarrhea  Neuropathy   Patient having tingling of his lower extremities, suspect diabetic neuropathy, patient declined gabapentin this point wants to speak with his primary care physician  HLD  Essential Hypertension   Type 2 Diabetes  Start sliding scale, well-controlled    Alfonso Bui DO  1/29/2023  11:14 AM

## 2023-01-29 NOTE — PLAN OF CARE
Problem: Skin/Tissue Integrity - Adult  Goal: Skin integrity remains intact  1/29/2023 1203 by Jose Viveros RN  Outcome: Progressing  Flowsheets (Taken 1/29/2023 1203)  Skin Integrity Remains Intact: Monitor for areas of redness and/or skin breakdown  1/28/2023 2328 by Precious Wang RN  Outcome: Progressing

## 2023-01-29 NOTE — CONSULTS
Urology Consultation    Patient:  Sammi Hines  MRN: [id number]    CHIEF COMPLAINT:  Left kidney stone    HISTORY OF PRESENT ILLNESS:   The patient is a 67 y.o. male who presents with LE cellulitis. CT ordered due to an elevated creatinine. CT shows a 17mm stone in the left renal pelvis. He is scheduled with Dr. Marciano Rubio 2/9 for left laser lithotripsy. He has no flank pain. Creatinine 1.26 on admit and 0.95 today. No LUTS. Patient's old records, notes and chart reviewed and summarized above.     Past Medical History:    Past Medical History:   Diagnosis Date    Atherosclerosis of aortic bifurcation and common iliac arteries ([facility] [address] 12/13/2018    H/O cardiac catheterization 2003    Hyperlipidemia     on med    Hypertension     on med    Skin cancer     Type 2 diabetes mellitus ([facility] [address]        Past Surgical History:    Past Surgical History:   Procedure Laterality Date    CARDIAC CATHETERIZATION  several    COLONOSCOPY      INGUINAL HERNIA REPAIR Right     PENIS SURGERY      SKIN CANCER EXCISION      TIBIA FRACTURE SURGERY Left     UPPER GASTROINTESTINAL ENDOSCOPY         Medications:    Scheduled Meds:   loperamide  2 mg Oral TID    insulin lispro  0-8 Units SubCUTAneous TID WC    insulin lispro  0-4 Units SubCUTAneous Nightly    ceFAZolin  2,000 mg IntraVENous Q8H    atorvastatin  10 mg Oral Nightly    atenolol  100 mg Oral Daily    aspirin  81 mg Oral Daily    sodium chloride flush  5-40 mL IntraVENous 2 times per day    heparin (porcine)  5,000 Units SubCUTAneous 3 times per day     Continuous Infusions:   sodium chloride      dextrose       PRN Meds:.sodium chloride flush, sodium chloride, potassium chloride **OR** potassium alternative oral replacement **OR** potassium chloride, magnesium sulfate, ondansetron **OR** ondansetron, polyethylene glycol, acetaminophen **OR** acetaminophen, glucose, dextrose bolus **OR** dextrose bolus, glucagon (rDNA), dextrose    Allergies:  Patient has no known allergies. Social History:    Social History     Socioeconomic History    Marital status:      Spouse name: Not on file    Number of children: Not on file    Years of education: Not on file    Highest education level: Not on file   Occupational History    Not on file   Tobacco Use    Smoking status: Former     Packs/day: 1.00     Years: 20.00     Pack years: 20.00     Types: Cigarettes     Quit date: 1980     Years since quittin.0    Smokeless tobacco: Never   Vaping Use    Vaping Use: Unknown   Substance and Sexual Activity    Alcohol use: No    Drug use: No    Sexual activity: Not on file   Other Topics Concern    Not on file   Social History Narrative    Not on file     Social Determinants of Health     Financial Resource Strain: Low Risk     Difficulty of Paying Living Expenses: Not hard at all   Food Insecurity: No Food Insecurity    Worried About Running Out of Food in the Last Year: Never true    [address] [place] [address] Place of Food in the Last Year: Never true   Transportation Needs: Not on file   Physical Activity: Inactive    Days of Exercise per Week: 0 days    Minutes of Exercise per Session: 10 min   Stress: Not on file   Social Connections: Not on file   Intimate Partner Violence: Not on file   Housing Stability: Not on file       Family History:    Family History   Problem Relation Age of Onset    Kidney Cancer Father     COPD Mother     Other Mother         aneurysm    No Known Problems Sister        REVIEW OF SYSTEMS:  All systems reviewed and negative except for that already noted in the HPI. Physical Exam:    This a 67 y.o. male   Patient Vitals for the past 24 hrs:   BP Temp Temp src Pulse Resp SpO2 Weight   23 0712 117/74 97.3 °F (36.3 °C) Oral 61 18 97 % --   23 0449 -- -- -- -- -- -- 288 lb (130.6 kg)   23 1843 122/74 97.7 °F (36.5 °C) Oral 70 18 98 % --     Constitutional: Patient in no acute distress; Neuro: alert and oriented to person place and time.     Psych: Mood and affect normal.  Skin: Normal  Lungs: Respiratory effort normal  Cardiovascular:  Normal peripheral pulses  Abdomen: Soft, non-tender, non-distended with no CVA, flank pain, hepatosplenomegaly or hernia. Kidneys normal.  Bladder non-tender and not distended. Lymphatics: no palpable lymphadenopathy  Penis normal and circumcised  Urethral meatus normal  Scrotal exam normal  Testicles normal bilaterally  Epididymis normal bilaterally  No evidence of inguinal hernia  Anus and perineum normal  Normal rectal tone with no masses  Prostate soft, non-tender to palpation. No palpable nodules. Estimated 40 grams. LABS:  Recent Labs     01/27/23  1356 01/28/23  0620 01/29/23  0536   WBC 18.6* 11.8* 8.2   HGB 13.9 12.5* 12.2*   HCT 41.8 38.1* 37.4*   MCV 95.7 96.7 97.4    159 141     Recent Labs     01/27/23  1356 01/28/23  0620 01/29/23  0536   * 138 137   K 3.5* 3.3* 3.8   CL 98 103 104   CO2 22 25 29   PHOS  --   --  2.7   BUN 27* 26* 20   CREATININE 1.26* 1.22* 0.95     Lab Results   Component Value Date    PSA 1.42 03/01/2022    PSA 1.60 05/19/2021    PSA 1.43 04/28/2020       Additional Lab/culture results:    Urinalysis: No results for input(s): COLORU, PHUR, LABCAST, WBCUA, RBCUA, MUCUS, TRICHOMONAS, YEAST, BACTERIA, CLARITYU, SPECGRAV, LEUKOCYTESUR, UROBILINOGEN, [place] in the last 72 hours. Invalid input(s): NITRATE, GLUCOSEUKETONESUAMORPHOUS     -----------------------------------------------------------------  Imaging Results:    Assessment and Plan   Impression:    Patient Active Problem List   Diagnosis    Mixed hyperlipidemia    Hyperglycemia    Essential hypertension    Type 2 diabetes mellitus without complication, without long-term current use of insulin (HCC)    Atherosclerosis of aortic bifurcation and common iliac arteries (HCC)    Squamous cell carcinoma of scalp    Cellulitis    SARA (acute kidney injury) ([facility] [address]       Plan: No acute urologic intervention required.   Keep appt  with Dr. Cindy Barthel provided cellulitis is resolved.     Camden You MD  10:32 AM 1/29/2023

## 2023-01-30 VITALS
HEIGHT: 72 IN | OXYGEN SATURATION: 97 % | TEMPERATURE: 97.7 F | SYSTOLIC BLOOD PRESSURE: 123 MMHG | BODY MASS INDEX: 39.01 KG/M2 | DIASTOLIC BLOOD PRESSURE: 68 MMHG | RESPIRATION RATE: 18 BRPM | WEIGHT: 288 LBS | HEART RATE: 69 BPM

## 2023-01-30 LAB
ALBUMIN SERPL-MCNC: 3.2 G/DL (ref 3.5–5.2)
ANION GAP SERPL CALCULATED.3IONS-SCNC: 9 MMOL/L (ref 9–17)
BUN BLDV-MCNC: 17 MG/DL (ref 8–23)
BUN/CREAT BLD: 18 (ref 9–20)
CALCIUM SERPL-MCNC: 8.4 MG/DL (ref 8.6–10.4)
CHLORIDE BLD-SCNC: 96 MMOL/L (ref 98–107)
CO2: 26 MMOL/L (ref 20–31)
CREAT SERPL-MCNC: 0.93 MG/DL (ref 0.7–1.2)
GFR SERPL CREATININE-BSD FRML MDRD: >60 ML/MIN/1.73M2
GLUCOSE BLD-MCNC: 152 MG/DL (ref 75–110)
GLUCOSE BLD-MCNC: 190 MG/DL (ref 70–99)
GLUCOSE BLD-MCNC: 204 MG/DL (ref 75–110)
HCT VFR BLD CALC: 36.4 % (ref 40.7–50.3)
HEMOGLOBIN: 12 G/DL (ref 13–17)
MAGNESIUM: 1.7 MG/DL (ref 1.6–2.6)
MCH RBC QN AUTO: 32.3 PG (ref 25.2–33.5)
MCHC RBC AUTO-ENTMCNC: 33 G/DL (ref 28.4–34.8)
MCV RBC AUTO: 97.8 FL (ref 82.6–102.9)
NRBC AUTOMATED: 0 PER 100 WBC
PDW BLD-RTO: 13.2 % (ref 11.8–14.4)
PHOSPHORUS: 3 MG/DL (ref 2.5–4.5)
PLATELET # BLD: 149 K/UL (ref 138–453)
PMV BLD AUTO: 11.1 FL (ref 8.1–13.5)
POTASSIUM SERPL-SCNC: 3.8 MMOL/L (ref 3.7–5.3)
RBC # BLD: 3.72 M/UL (ref 4.21–5.77)
SODIUM BLD-SCNC: 131 MMOL/L (ref 135–144)
WBC # BLD: 8 K/UL (ref 3.5–11.3)

## 2023-01-30 PROCEDURE — 85027 COMPLETE CBC AUTOMATED: CPT

## 2023-01-30 PROCEDURE — 2580000003 HC RX 258: Performed by: INTERNAL MEDICINE

## 2023-01-30 PROCEDURE — 36415 COLL VENOUS BLD VENIPUNCTURE: CPT

## 2023-01-30 PROCEDURE — 83735 ASSAY OF MAGNESIUM: CPT

## 2023-01-30 PROCEDURE — 6370000000 HC RX 637 (ALT 250 FOR IP): Performed by: NURSE PRACTITIONER

## 2023-01-30 PROCEDURE — 2580000003 HC RX 258: Performed by: NURSE PRACTITIONER

## 2023-01-30 PROCEDURE — 99232 SBSQ HOSP IP/OBS MODERATE 35: CPT | Performed by: INTERNAL MEDICINE

## 2023-01-30 PROCEDURE — 6360000002 HC RX W HCPCS: Performed by: NURSE PRACTITIONER

## 2023-01-30 PROCEDURE — 80069 RENAL FUNCTION PANEL: CPT

## 2023-01-30 PROCEDURE — 99239 HOSP IP/OBS DSCHRG MGMT >30: CPT | Performed by: INTERNAL MEDICINE

## 2023-01-30 PROCEDURE — 82947 ASSAY GLUCOSE BLOOD QUANT: CPT

## 2023-01-30 PROCEDURE — 6360000002 HC RX W HCPCS: Performed by: INTERNAL MEDICINE

## 2023-01-30 RX ORDER — CEPHALEXIN 500 MG/1
500 CAPSULE ORAL 4 TIMES DAILY
Qty: 28 CAPSULE | Refills: 0 | Status: SHIPPED | OUTPATIENT
Start: 2023-01-30 | End: 2023-02-06

## 2023-01-30 RX ADMIN — ATENOLOL 100 MG: 50 TABLET ORAL at 10:48

## 2023-01-30 RX ADMIN — ASPIRIN 81 MG: 81 TABLET, COATED ORAL at 10:48

## 2023-01-30 RX ADMIN — SODIUM CHLORIDE, PRESERVATIVE FREE 10 ML: 5 INJECTION INTRAVENOUS at 10:48

## 2023-01-30 RX ADMIN — SODIUM CHLORIDE 2000 MG: 9 INJECTION, SOLUTION INTRAVENOUS at 05:50

## 2023-01-30 RX ADMIN — HEPARIN SODIUM 5000 UNITS: 5000 INJECTION INTRAVENOUS; SUBCUTANEOUS at 05:51

## 2023-01-30 ASSESSMENT — ENCOUNTER SYMPTOMS
GASTROINTESTINAL NEGATIVE: 1
COLOR CHANGE: 1
RESPIRATORY NEGATIVE: 1

## 2023-01-30 NOTE — DISCHARGE INSTR - ACTIVITY
See PCP 1 week, followup on diarrhea   See Infectious disease if needed if leg infection continues to occur.    Take medications as prescribed   Follow with Dr. Sherri Joseph as scheduled

## 2023-01-30 NOTE — PLAN OF CARE
Problem: Discharge Planning  Goal: Discharge to home or other facility with appropriate resources  Outcome: Adequate for Discharge  Flowsheets (Taken 1/30/2023 0745)  Discharge to home or other facility with appropriate resources:   Identify barriers to discharge with patient and caregiver   Arrange for needed discharge resources and transportation as appropriate   Identify discharge learning needs (meds, wound care, etc)   Refer to discharge planning if patient needs post-hospital services based on physician order or complex needs related to functional status, cognitive ability or social support system     Problem: Skin/Tissue Integrity - Adult  Goal: Skin integrity remains intact  Outcome: Adequate for Discharge  Flowsheets (Taken 1/30/2023 0745)  Skin Integrity Remains Intact: Monitor for areas of redness and/or skin breakdown     Problem: Skin/Tissue Integrity - Adult  Goal: Incisions, wounds, or drain sites healing without S/S of infection  Outcome: Adequate for Discharge  Flowsheets (Taken 1/30/2023 0745)  Incisions, Wounds, or Drain Sites Healing Without Sign and Symptoms of Infection:   TWICE DAILY: Assess and document skin integrity   TWICE DAILY: Assess and document dressing/incision, wound bed, drain sites and surrounding tissue     Problem: Musculoskeletal - Adult  Goal: Return mobility to safest level of function  Outcome: Adequate for Discharge  Flowsheets (Taken 1/30/2023 0745)  Return Mobility to Safest Level of Function:   Assess patient stability and activity tolerance for standing, transferring and ambulating with or without assistive devices   Assist with transfers and ambulation using safe patient handling equipment as needed   Ensure adequate protection for wounds/incisions during mobilization   Obtain physical therapy/occupational therapy consults as needed   Apply continuous passive motion per provider or physical therapy orders to increase flexion toward goal   Instruct patient/family in ordered activity level     Problem: Chronic Conditions and Co-morbidities  Goal: Patient's chronic conditions and co-morbidity symptoms are monitored and maintained or improved  Outcome: Adequate for Discharge  Flowsheets (Taken 1/30/2023 1784)  Care Plan - Patient's Chronic Conditions and Co-Morbidity Symptoms are Monitored and Maintained or Improved:   Monitor and assess patient's chronic conditions and comorbid symptoms for stability, deterioration, or improvement   Collaborate with multidisciplinary team to address chronic and comorbid conditions and prevent exacerbation or deterioration   Update acute care plan with appropriate goals if chronic or comorbid symptoms are exacerbated and prevent overall improvement and discharge     Problem: ABCDS Injury Assessment  Goal: Absence of physical injury  Outcome: Adequate for Discharge  Flowsheets (Taken 1/30/2023 1258)  Absence of Physical Injury: Implement safety measures based on patient assessment

## 2023-01-30 NOTE — PROGRESS NOTES
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 Infection Control Recommendations:   Universal precautions    Discharge Planning:   Estimated Length of IV antimicrobials: While hospitalized  Patient will need Midline Catheter Insertion/ PICC line Insertion: No  Patient will need: Home IV , [place],  SNF,  LTAC: Undetermined  Patient willneed outpatient wound care: No    Medical Decision making / Summary of Stay:   Ashley Burnette is a 67y.o.-year-old male who was initially admitted on 1/27/2023. Oiv Wright has a history of diabetes mellitus type 2 with associated neuropathy, peripheral arterial disease, hypertension, hyperlipidemia, some venous stasis dermatitis and he was in his usual state of health until earlier in the week when the wife seemed to notice that he had an abnormal gait. The patient has had a couple of scabs in his legs and it was then subsequently noted when he was in some shorts that his leg was red swollen and he had drainage from a previously scabbed area  The patient was seen in the emergency department was diagnosed with cellulitis.   White blood cell count is elevated at 18.6, CRP elevated at 256.3    The patient was admitted started on IV antimicrobial therapy and I was asked to evaluate and help with antibiotic choice. Current evaluation:2023    BP (!) 141/77   Pulse 63   Temp 97.5 °F (36.4 °C) (Oral)   Resp 18   Ht 6' (1.829 m)   Wt 288 lb (130.6 kg)   SpO2 97%   BMI 39.06 kg/m²     Temperature Range: Temp: 97.5 °F (36.4 °C) Temp  Av.8 °F (36.6 °C)  Min: 97.5 °F (36.4 °C)  Max: 98.1 °F (36.7 °C)  The patient is seen and evaluated at bedside and is awake and alert in no acute distress per  No subjective fever or chills. No abdominal pain nausea vomiting or diarrhea. No chest pains or palpitations. Review of Systems   Constitutional: Negative. HENT: Negative. Respiratory: Negative. Cardiovascular: Negative. Gastrointestinal: Negative. Genitourinary: Negative. Musculoskeletal: Negative. Skin:  Positive for color change and wound. Neurological: Negative. Psychiatric/Behavioral: Negative. Physical Examination :     Physical Exam  Constitutional:       Appearance: He is well-developed. He is obese. HENT:      Head: Normocephalic and atraumatic. Cardiovascular:      Rate and Rhythm: Normal rate. Heart sounds: Normal heart sounds. No friction rub. No gallop. Pulmonary:      Effort: Pulmonary effort is normal.      Breath sounds: Normal breath sounds. No wheezing. Abdominal:      General: Bowel sounds are normal.      Palpations: Abdomen is soft. There is no mass. Tenderness: There is no abdominal tenderness. Musculoskeletal:         General: Normal range of motion. Cervical back: Normal range of motion and neck supple. Lymphadenopathy:      Cervical: No cervical adenopathy. Skin:     General: Skin is warm and dry. Findings: Erythema present.       Comments: Venous stasis dermatitis skin changes in the lower extremities bilaterally   Neurological:      Mental Status: He is alert and oriented to person, place, and time.       Laboratory data:   I have independently reviewed the followinglabs:  CBC with Differential:   Recent Labs     01/27/23  1356 01/28/23  0620 01/29/23  0536 01/30/23  0528   WBC 18.6* 11.8* 8.2 8.0   HGB 13.9 12.5* 12.2* 12.0*   HCT 41.8 38.1* 37.4* 36.4*    159 141 149   LYMPHOPCT 6* 10*  --   --    MONOPCT 5 7  --   --        BMP:   Recent Labs     01/29/23  0536 01/30/23  0528    131*   K 3.8 3.8    96*   CO2 29 26   BUN 20 17   CREATININE 0.95 0.93   MG 1.8 1.7       Hepatic Function Panel:   Recent Labs     01/29/23  0536 01/30/23  0528   LABALBU 3.1* 3.2*           No results found for: PROCAL  Lab Results   Component Value Date/Time    .3 01/27/2023 01:56 PM    CRP 1.7 12/07/2018 12:00 PM     Lab Results   Component Value Date    SEDRATE 22 (H) 01/27/2023         Lab Results   Component Value Date/Time    DDIMER 1.11 01/27/2023 01:56 PM     No results found for: FERRITIN  No results found for: LDH  No results found for: FIBRINOGEN    No results found for requested labs within last 30 days. Lab Results   Component Value Date/Time    COVID19 POSITIVE 03/01/2022 08:35 AM       No results for input(s): VANCOTROUGH in the last 72 hours. Imaging Studies:   No new imaging    Cultures:     Culture, Anaerobic and Aerobic [[id number]] (Abnormal) Collected: 01/27/23 1340   Order Status: Completed Specimen: Leg Updated: 01/30/23 0805    Specimen Description . LEG RT LOWER LATERAL    Direct Exam RARE NEUTROPHILS Abnormal      MODERATE GRAM POSITIVE COCCI IN PAIRS Abnormal     Culture STREPTOCOCCUS PYOGENES (GROUP A) MODERATE GROWTH Abnormal      No anaerobic organisms isolated at 3 days. Culture, Blood 1 [[id number]] Collected: 01/27/23 1540   Order Status: Completed Specimen: Blood Updated: 01/29/23 0852    Specimen Description . BLOOD    Special Requests lt ac 20ml    Culture NO GROWTH 2 DAYS   Culture, Blood 1 [[id number]] Collected: 01/27/23 1558   Order Status: Completed Specimen: Blood Updated: 01/29/23 0852    Specimen Description . BLOOD    Special Requests RT AC 20 ML    Culture NO GROWTH 2 DAYS     Medications:      loperamide  2 mg Oral TID    insulin lispro  0-8 Units SubCUTAneous TID WC    insulin lispro  0-4 Units SubCUTAneous Nightly    ceFAZolin  2,000 mg IntraVENous Q8H    atorvastatin  10 mg Oral Nightly    atenolol  100 mg Oral Daily    aspirin  81 mg Oral Daily    sodium chloride flush  5-40 mL IntraVENous 2 times per day    heparin (porcine)  5,000 Units SubCUTAneous 3 times per day       Electronically signed by Mauri Camara MD on 1/30/2023 at 9:58 AM      [facility]  Mauri Camara MD  [employer]  OFFICE: [phone]    Thank you for allowing us to participate in the care of this patient. Please call with questions. This note is created with the assistance of a speech recognition program.  While intending to generate a document that actually reflects the content of the visit, the document can still have some errors including those of syntax and sound a like substitutions which may escape proof reading. In such instances, actual meaning can be extrapolated by contextual diversion.

## 2023-01-30 NOTE — PLAN OF CARE
Problem: Discharge Planning  Goal: Discharge to home or other facility with appropriate resources  1/30/2023 1420 by Bridget Guzman RN  Outcome: Completed     Problem: Skin/Tissue Integrity - Adult  Goal: Skin integrity remains intact  1/30/2023 1420 by Bridget Guzman RN  Outcome: Completed     Problem: Skin/Tissue Integrity - Adult  Goal: Incisions, wounds, or drain sites healing without S/S of infection  1/30/2023 1420 by Bridget Guzman RN  Outcome: Completed     Problem: Musculoskeletal - Adult  Goal: Return mobility to safest level of function  1/30/2023 1420 by Bridget Guzman RN  Outcome: Completed     Problem: Chronic Conditions and Co-morbidities  Goal: Patient's chronic conditions and co-morbidity symptoms are monitored and maintained or improved  1/30/2023 1420 by Bridget Guzman RN  Outcome: Completed     Problem: ABCDS Injury Assessment  Goal: Absence of physical injury  1/30/2023 1420 by Bridget Guzman RN  Outcome: Completed

## 2023-01-30 NOTE — PLAN OF CARE
Problem: Discharge Planning  Goal: Discharge to home or other facility with appropriate resources  Outcome: Progressing     Problem: Skin/Tissue Integrity - Adult  Goal: Skin integrity remains intact  1/29/2023 2015 by Manuel Peacock RN  Outcome: Progressing  1/29/2023 1203 by Maggy Garcia RN  Outcome: Progressing  Flowsheets (Taken 1/29/2023 1203)  Skin Integrity Remains Intact: Monitor for areas of redness and/or skin breakdown  Goal: Incisions, wounds, or drain sites healing without S/S of infection  Outcome: Progressing     Problem: Musculoskeletal - Adult  Goal: Return mobility to safest level of function  Outcome: Progressing     Problem: Chronic Conditions and Co-morbidities  Goal: Patient's chronic conditions and co-morbidity symptoms are monitored and maintained or improved  Outcome: Progressing     Problem: ABCDS Injury Assessment  Goal: Absence of physical injury  Outcome: Progressing

## 2023-01-30 NOTE — DISCHARGE INSTR - COC
Continuity of Care Form    Patient Name: Nurys Michel   :  1950  MRN:  [id number]    [facility] date:  2023  Discharge date:  ***    Code Status Order: Full Code   Advance Directives:     Admitting Physician:  Jennifer Sheikh DO  PCP: RILEY Powell CNP    Discharging Nurse: [facility] Unit/Room#:   Discharging Unit Phone Number: ***    Emergency Contact:   Extended Emergency Contact Information  Primary Emergency Contact: Dominga Gunderson  Address: [address] [place] [address] Phone: [phone]  Relation: Spouse    Past Surgical History:  Past Surgical History:   Procedure Laterality Date    CARDIAC CATHETERIZATION  several    COLONOSCOPY      INGUINAL HERNIA REPAIR Right     PENIS SURGERY      SKIN CANCER EXCISION      TIBIA FRACTURE SURGERY Left     UPPER GASTROINTESTINAL ENDOSCOPY         Immunization History:   Immunization History   Administered Date(s) Administered    Influenza Virus Vaccine 10/20/2014    Influenza, FLUAD, (age 72 y+), Adjuvanted, 0.5mL 2020, 2022    Influenza, High Dose (Fluzone 65 yrs and older) 2015, 2016, 2017, 2018    Influenza, Triv, inactivated, subunit, adjuvanted, IM (Fluad 65 yrs and older) 2019    Pneumococcal Conjugate 13-valent (Pacheco Mon) 2015, 2022    Pneumococcal Polysaccharide (Clbxxjoud37) 2017    Tetanus 10/27/2015    Zoster Live (Zostavax) 10/20/2016       Active Problems:  Patient Active Problem List   Diagnosis Code    Mixed hyperlipidemia E78.2    Hyperglycemia R73.9    Essential hypertension I10    Type 2 diabetes mellitus without complication, without long-term current use of insulin ([facility] E11.9    Atherosclerosis of aortic bifurcation and common iliac arteries (HCC) I70.0, I70.8    Squamous cell carcinoma of scalp C44.42    Cellulitis L03.90    SARA (acute kidney injury) ([facility] N17.9    Cellulitis of right lower extremity without foot L03.115 Isolation/Infection:   Isolation            No Isolation          Patient Infection Status       None to display            Nurse Assessment:  Last Vital Signs: BP (!) 141/77   Pulse 63   Temp 97.5 °F (36.4 °C) (Oral)   Resp 18   Ht 6' (1.829 m)   Wt 288 lb (130.6 kg)   SpO2 97%   BMI 39.06 kg/m²     Last documented pain score (0-10 scale):    Last Weight:   Wt Readings from Last 1 Encounters:   01/29/23 288 lb (130.6 kg)     Mental Status:  {IP PT MENTAL STATUS:20030}    IV Access:  { BRAVO IV ACCESS:316091417}    Nursing Mobility/ADLs:  Walking   {[facility] DME XINS:347106563}  Transfer  {[facility] DME EAHA:115865919}  Bathing  {[facility] DME POJM:184940519}  Dressing  {[facility] DME FATB:538229548}  Toileting  {[facility] DME VGBH:166856383}  Feeding  {[facility] DME BMQO:761437323}  Med Admin  {[facility] DME NTXM:359761814}  Med Delivery   { BRAVO MED Delivery:351069585}    Wound Care Documentation and Therapy:  Wound 01/27/23 Pretibial Distal;Lateral approx 1cm Douglas, see progress notes (Active)   Dressing/Treatment Non adherent; Other (comment) 01/29/23 2010   Wound Assessment Pink/red 01/29/23 2010   Drainage Amount Scant 01/29/23 2010   Odor None 01/29/23 2010   Ann-wound Assessment Warm 01/29/23 2010   Number of days: 2       Wound 01/27/23 Pretibial Left; Anterior approx 1cm bloody scab, surrounding tissue unremarkable (Active)   Dressing/Treatment Open to air 01/29/23 2010   Wound Assessment Other (Comment) 01/29/23 2010   Drainage Amount None 01/29/23 2010   Ann-wound Assessment Warm 01/29/23 2010   Number of days: 2        Elimination:  Continence: Bowel: {YES / GUPTA:38692}  Bladder: {YES / KD:92843}  Urinary Catheter: {Urinary Catheter:516046751}   Colostomy/Ileostomy/Ileal Conduit: {YES / DU:59212}       Date of Last BM: ***    Intake/Output Summary (Last 24 hours) at 1/30/2023 1127  Last data filed at 1/30/2023 0603  Gross per 24 hour   Intake 780 ml   Output --   Net 780 ml     I/O last 3 completed shifts:   In: 1020 [P.O.:1020]  Out: -     Safety Concerns:     [address] [employer] Safety Concerns:383039961}    Impairments/Disabilities:      [facility] Impairments/Disabilities:586091674}    Nutrition Therapy:  Current Nutrition Therapy:   [facility] [employer] Diet List:240683436}    Routes of Feeding: {CHP DME Other Feedings:423879270}  Liquids: {Slp liquid thickness:81603}  Daily Fluid Restriction: {CHP DME Yes amt example:359119622}  Last Modified Barium Swallow with Video (Video Swallowing Test): {Done Not Done DSVP:098032661}    Treatments at the Time of Hospital Discharge:   Respiratory Treatments: ***  Oxygen Therapy:  {Therapy; copd oxygen:53670}  Ventilator:    {MH CC Vent HNAB:210121795}    Rehab Therapies: {THERAPEUTIC INTERVENTION:5852093508}  Weight Bearing Status/Restrictions: [address] [employer]  Weight Bearin}  Other Medical Equipment (for information only, NOT a DME order):  {EQUIPMENT:849293744}  Other Treatments: ***    Patient's personal belongings (please select all that are sent with patient):  {CHP DME Belongings:244481477}    RN SIGNATURE:  {Esignature:715290604}    CASE MANAGEMENT/SOCIAL WORK SECTION    Inpatient Status Date: ***    Readmission Risk Assessment Score:  Readmission Risk              Risk of Unplanned Readmission:  15           Discharging to Facility/ Agency   Name:   Address:  Phone:  Fax:    Dialysis Facility (if applicable)   Name:  Address:  Dialysis Schedule:  Phone:  Fax:    / signature: {Esignature:946306425}    PHYSICIAN SECTION    Prognosis: Good    Condition at Discharge: Stable    Rehab Potential (if transferring to Rehab): Good    Recommended Labs or Other Treatments After Discharge:   See PCP 1 week, followup on diarrhea   See Infectious disease if needed if leg infection continues to occur.      Physician Certification: I certify the above information and transfer of Carmelita Dale  is necessary for the continuing treatment of the diagnosis listed and that he requires [address] for greater 30 days.      Update Admission H&P: No change in H&P    PHYSICIAN SIGNATURE:  Electronically signed by Edwin Zurita DO on 1/30/23 at 11:27 AM EST

## 2023-01-30 NOTE — PROGRESS NOTES
Physician Progress Note      PATIENT:               Joslyn Hood  [facility] #:                  [id number]  :                       1950  ADMIT DATE:       2023 1:27 PM  Dimitrios Bueno DATE:        2023 2:34 PM  RESPONDING  PROVIDER #:        Marielle Fields Becerril DO          QUERY TEXT:    Pt admitted with cellulitis of right lower extremity. Pt noted to have WBC   18.6, lactic acid 2.5, .3, SARA. If possible, please document in the   progress notes and discharge summary if you are evaluating and /or treating   any of the following: The medical record reflects the following:  Risk Factors: cellulitis of right lower extremity, DM 2, advanced age  Clinical Indicators: WBC 18.6, lactic acid 2.5, .3, SARA  Treatment: IV antibiotics, IVF, labs, imaging, ID consult  Options provided:  -- Sepsis, present on admission  -- Cellulitis of right lower extremity without sepsis  -- Other - I will add my own diagnosis  -- Disagree - Not applicable / Not valid  -- Disagree - Clinically unable to determine / Unknown  -- Refer to Clinical Documentation Reviewer    PROVIDER RESPONSE TEXT:    This patient has cellulitis of right lower extremity without sepsis. Query created by: Job Douglas on 2023 2:07 PM      QUERY TEXT:    Pt admitted with right lower extremity cellulitis and noted to have DM type 2. If possible, please document in progress notes and discharge summary the   relationship, if any, between cellulitis and DM.     The medical record reflects the following:  Risk Factors: right lower extremity cellulitis, DM 2 with neuropathy, stasis   dermatitis, advanced age  Clinical Indicators: admitted with right lower extremity cellulitis and noted   to have DM type 2 with neuropathy  Treatment: IV antibiotics, ID consult, glucose control, elevated limb  Options provided:  -- Right lower extremity cellulitis associated with diabetes  -- Right lower extremity cellulitis unrelated to diabetes  -- Other - I will add my own diagnosis  -- Disagree - Not applicable / Not valid  -- Disagree - Clinically unable to determine / Unknown  -- Refer to Clinical Documentation Reviewer    PROVIDER RESPONSE TEXT:    Right lower extremity cellulitis associated with diabetes.     Query created by: Muna Roca on 1/30/2023 2:11 PM      Electronically signed by:  Víctor Beltran DO 1/30/2023 5:14 PM

## 2023-01-30 NOTE — PROGRESS NOTES
[facility]   Urology Progress Note            Subjective: follow-up renal lithiasis    Patient Vitals for the past 24 hrs:   BP Temp Temp src Pulse Resp SpO2   01/29/23 1828 134/76 98.1 °F (36.7 °C) Oral 69 16 97 %   01/29/23 0712 117/74 97.3 °F (36.3 °C) Oral 61 18 97 %       Intake/Output Summary (Last 24 hours) at 1/30/2023 0615  Last data filed at 1/30/2023 0603  Gross per 24 hour   Intake 1020 ml   Output --   Net 1020 ml       Recent Labs     01/28/23  0620 01/29/23  0536 01/30/23  0528   WBC 11.8* 8.2 8.0   HGB 12.5* 12.2* 12.0*   HCT 38.1* 37.4* 36.4*   MCV 96.7 97.4 97.8    141 149     Recent Labs     01/28/23  0620 01/29/23  0536 01/30/23  0528    137 131*   K 3.3* 3.8 3.8    104 96*   CO2 25 29 26   PHOS  --  2.7 3.0   BUN 26* 20 17   CREATININE 1.22* 0.95 0.93       No results for input(s): COLORU, PHUR, LABCAST, WBCUA, RBCUA, MUCUS, TRICHOMONAS, YEAST, BACTERIA, CLARITYU, SPECGRAV, LEUKOCYTESUR, UROBILINOGEN, BILIRUBINUR, BLOODU in the last 72 hours.     Invalid input(s): NITRATE, GLUCOSEUKETONESUAMORPHOUS    Additional Lab/culture results:    Physical Exam: Patient alert oriented not in acute distress, no flank pain, discussed with the patient renal lithiasis, also discussed follow-up with his urologist [address] [place] [address] urology Dr. Maria G Tiwari, patient advised to contact their office tomorrow for further instructions in view of the current cellulitis of the right lower extremity the patient voiced understanding    Interval Imaging Findings:    Impression:    Patient Active Problem List   Diagnosis    Mixed hyperlipidemia    Hyperglycemia    Essential hypertension    Type 2 diabetes mellitus without complication, without long-term current use of insulin ([address]    Atherosclerosis of aortic bifurcation and common iliac arteries (HCC)    Squamous cell carcinoma of scalp    Cellulitis    SARA (acute kidney injury) ([address]    Cellulitis of right lower extremity without foot       Plan: Agree with discharge planning from urology standpoint with follow-up with [address] [place] [address] urology as outpatient    Eva Canales MD  6:15 AM 1/30/2023

## 2023-01-31 NOTE — PROGRESS NOTES
Physician Progress Note      PATIENT:               Mattie Enciso  [facility] #:                  [id number]  :                       1950  ADMIT DATE:       2023 1:27 PM  Dimitrios Umanzor [place] DATE:        2023 2:34 PM  RESPONDING  PROVIDER #:        Adi Martinez DO          QUERY TEXT:    Patient admitted with right lower extremity cellulitis. Pt noted to have SARA   throughout the record with creatinine: () 1.26, () 1.22, () 0.95,   () 0.93. In order to support the diagnosis of SARA, please include   additional clinical indicators in your documentation, or please document if   the diagnosis of SARA has been ruled out after further study. The medical record reflects the following:  Risk Factors: advanced age, RLE cellulitis, diarrhea, DM, HTN, nephrolithiasis  Clinical Indicators: noted to have SARA; Creatinine: () 1.26, () 1.22,   () 0.95, () 0.93  Treatment: labs, IV fluids    Defined by Kidney Disease Improving Global Outcomes (KDIGO) clinical practice   guideline for acute kidney injury:  -Increase in SCr by greater than or equal to 0.3 mg/dl within 48 hours; or  -Increase or decrease in SCr to greater than or equal to 1.5 times baseline,   which is known or presumed to have occurred within the prior 7 days; or  -Urine volume < 0.5ml/kg/h for 6 hours. Options provided:  -- Acute kidney injury evidenced by, Please document evidence as well as a   numerical baseline creatinine, if known. -- Acute kidney injury ruled out after study  -- Other - I will add my own diagnosis  -- Disagree - Not applicable / Not valid  -- Disagree - Clinically unable to determine / Unknown  -- Refer to Clinical Documentation Reviewer    PROVIDER RESPONSE TEXT:    Acute kidney injury was ruled out after study.     Query created by: Keaton Epstein on 2023 10:08 AM      Electronically signed by:  Adi Martinez DO 2023 11:39 AM

## 2023-02-01 ENCOUNTER — OFFICE VISIT (OUTPATIENT)
Dept: FAMILY MEDICINE CLINIC | Age: 73
End: 2023-02-01
Payer: MEDICARE

## 2023-02-01 VITALS
WEIGHT: 293.4 LBS | TEMPERATURE: 96.3 F | OXYGEN SATURATION: 96 % | HEIGHT: 72 IN | BODY MASS INDEX: 39.74 KG/M2 | SYSTOLIC BLOOD PRESSURE: 138 MMHG | HEART RATE: 67 BPM | DIASTOLIC BLOOD PRESSURE: 70 MMHG

## 2023-02-01 DIAGNOSIS — I10 ESSENTIAL HYPERTENSION: ICD-10-CM

## 2023-02-01 DIAGNOSIS — E11.9 TYPE 2 DIABETES MELLITUS WITHOUT COMPLICATION, WITHOUT LONG-TERM CURRENT USE OF INSULIN (HCC): ICD-10-CM

## 2023-02-01 DIAGNOSIS — L03.115 CELLULITIS OF RIGHT LOWER EXTREMITY: Primary | ICD-10-CM

## 2023-02-01 DIAGNOSIS — Z09 HOSPITAL DISCHARGE FOLLOW-UP: ICD-10-CM

## 2023-02-01 LAB
CREATININE URINE POCT: 200
HBA1C MFR BLD: 6.8 %
MICROALBUMIN/CREAT 24H UR: 150 MG/G{CREAT}
MICROALBUMIN/CREAT UR-RTO: NORMAL
MICROORGANISM SPEC CULT: ABNORMAL
MICROORGANISM SPEC CULT: ABNORMAL
MICROORGANISM SPEC CULT: NORMAL
MICROORGANISM SPEC CULT: NORMAL
MICROORGANISM/AGENT SPEC: ABNORMAL
MICROORGANISM/AGENT SPEC: ABNORMAL
SERVICE CMNT-IMP: NORMAL
SERVICE CMNT-IMP: NORMAL
SPECIMEN DESCRIPTION: ABNORMAL
SPECIMEN DESCRIPTION: NORMAL
SPECIMEN DESCRIPTION: NORMAL

## 2023-02-01 PROCEDURE — 3044F HG A1C LEVEL LT 7.0%: CPT | Performed by: NURSE PRACTITIONER

## 2023-02-01 PROCEDURE — 3078F DIAST BP <80 MM HG: CPT | Performed by: NURSE PRACTITIONER

## 2023-02-01 PROCEDURE — 82044 UR ALBUMIN SEMIQUANTITATIVE: CPT | Performed by: NURSE PRACTITIONER

## 2023-02-01 PROCEDURE — 99214 OFFICE O/P EST MOD 30 MIN: CPT | Performed by: NURSE PRACTITIONER

## 2023-02-01 PROCEDURE — 1123F ACP DISCUSS/DSCN MKR DOCD: CPT | Performed by: NURSE PRACTITIONER

## 2023-02-01 PROCEDURE — 83036 HEMOGLOBIN GLYCOSYLATED A1C: CPT | Performed by: NURSE PRACTITIONER

## 2023-02-01 PROCEDURE — 1111F DSCHRG MED/CURRENT MED MERGE: CPT | Performed by: NURSE PRACTITIONER

## 2023-02-01 PROCEDURE — 3075F SYST BP GE 130 - 139MM HG: CPT | Performed by: NURSE PRACTITIONER

## 2023-02-01 RX ORDER — TIRZEPATIDE 2.5 MG/.5ML
2.5 INJECTION, SOLUTION SUBCUTANEOUS WEEKLY
Qty: 2 ML | Refills: 0 | Status: SHIPPED | OUTPATIENT
Start: 2023-02-01

## 2023-02-01 RX ORDER — AMLODIPINE BESYLATE 10 MG/1
10 TABLET ORAL DAILY
Qty: 90 TABLET | Refills: 1 | Status: SHIPPED | OUTPATIENT
Start: 2023-02-01

## 2023-02-01 SDOH — ECONOMIC STABILITY: INCOME INSECURITY: HOW HARD IS IT FOR YOU TO PAY FOR THE VERY BASICS LIKE FOOD, HOUSING, MEDICAL CARE, AND HEATING?: NOT HARD AT ALL

## 2023-02-01 SDOH — ECONOMIC STABILITY: FOOD INSECURITY: WITHIN THE PAST 12 MONTHS, YOU WORRIED THAT YOUR FOOD WOULD RUN OUT BEFORE YOU GOT MONEY TO BUY MORE.: NEVER TRUE

## 2023-02-01 SDOH — ECONOMIC STABILITY: HOUSING INSECURITY
IN THE LAST 12 MONTHS, WAS THERE A TIME WHEN YOU DID NOT HAVE A STEADY PLACE TO SLEEP OR SLEPT IN A SHELTER (INCLUDING NOW)?: NO

## 2023-02-01 SDOH — ECONOMIC STABILITY: FOOD INSECURITY: WITHIN THE PAST 12 MONTHS, THE FOOD YOU BOUGHT JUST DIDN'T LAST AND YOU DIDN'T HAVE MONEY TO GET MORE.: NEVER TRUE

## 2023-02-01 ASSESSMENT — ENCOUNTER SYMPTOMS
NAUSEA: 0
COUGH: 0
SHORTNESS OF BREATH: 0
CHEST TIGHTNESS: 0
ABDOMINAL PAIN: 0
VOMITING: 0
COLOR CHANGE: 1
WHEEZING: 0

## 2023-02-01 ASSESSMENT — PATIENT HEALTH QUESTIONNAIRE - PHQ9
SUM OF ALL RESPONSES TO PHQ QUESTIONS 1-9: 0
SUM OF ALL RESPONSES TO PHQ9 QUESTIONS 1 & 2: 0
1. LITTLE INTEREST OR PLEASURE IN DOING THINGS: 0
SUM OF ALL RESPONSES TO PHQ QUESTIONS 1-9: 0
2. FEELING DOWN, DEPRESSED OR HOPELESS: 0

## 2023-02-01 NOTE — DISCHARGE SUMMARY
[facility]  Office: [address], DO, Дмитрий Cintron, DO, Jacques Dash, DO, Kristin Slade Blood, DO, Grayson Wu MD, Francisco Waterman MD, Sofia Sawant MD, Yared Navarrete MD,  Carmel Dao MD, Nati Wharton MD, Pam Schmid DO, Shu Johnson MD,  Caryl Mcdowell MD, Starla Mondragon MD, Kalee Macario DO, Kathy Cervantes MD, Christopher Cardenas MD, eJssika Hernandez DO, Ashia Teixeira MD, Chay Wilks MD, Margarita Montgomery MD, John Ambrose MD, Belkys Holland DO, Marci Garcia MD, Kinza Hemphill MD, Chris Johns, CNP,  Elle Olmedo, CNP, Severiano Baptist, CNP, Padmini Lindsey, CNP,  Alicia Escamilla, [facility], Salome Junior, CNP, Nataly Wilkerson, CNP, Lula Quintana, CNP, Amada Joseph, CNP, Latonia Madden, CNP, Juliet Teixeira PA-C, Laisha Pérez, CNS, Greg Almonte, CNP, Jered Devlin, [facility]         [facility]    Discharge Summary     Patient ID: Sammi Hines  :  1950   MRN: [id number]     ACCOUNT:  [de-identified]   Patient's PCP: RILEY Swift CNP  Admit Date: 2023   Discharge Date: 23    Length of Stay: 3  Code Status:  Prior  Admitting Physician: Pam Schmid DO  Discharge Physician: Pam Schmid DO     Active Discharge Diagnoses:     Hospital Problem Lists:  Principal Problem:    Cellulitis  Active Problems:    SARA (acute kidney injury) [facility])    Cellulitis of right lower extremity without foot    Mixed hyperlipidemia    Essential hypertension    Type 2 diabetes mellitus without complication, without long-term current use of insulin [facility])  Resolved Problems:    * No resolved hospital problems. *      Admission Condition:  good     Discharged Condition: good    Hospital Stay:     Hospital Course:  75-year-old male presents to the emergency department for right lower extremity redness and swelling.   Patient was previously seen for left lower extremity redness similarly which was treated with antibiotics. Patient states that this time it started with a scab that started on his shin. Patient was started on antibiotics and infectious disease was consulted. Patient also complaining of lower extremity weakness has been ongoing for many months, patient has a history of diabetes and currently does not admit to weakness, back pain, radiating pain and is not worsening quickly. Patient also complains of 2-3 episodes of diarrhea daily that has been ongoing for many weeks. Patient has been taking over-the-counter medications for diarrhea    Subsequent days patient was seen by infectious disease and initially was changed to IV Ancef and right leg slowly improved. Patient also had no further complains of diarrhea subsequent days. Patient eventually was transitioned to oral Keflex by infectious disease and cleared for discharge.   Patient underwent evaluation for kidney stone, initially presented with SARA and patient had mild hydronephrosis, seen by urology not recommending intervention during hospitalization, patient has an appointment on 2/9/2023 to undergo lithotripsy for this kidney stone at [facility]        Significant therapeutic interventions:    none  Significant Diagnostic Studies:   Labs / Micro:  CBC:   Lab Results   Component Value Date/Time    WBC 8.0 01/30/2023 05:28 AM    RBC 3.72 01/30/2023 05:28 AM    HGB 12.0 01/30/2023 05:28 AM    HCT 36.4 01/30/2023 05:28 AM    MCV 97.8 01/30/2023 05:28 AM    MCH 32.3 01/30/2023 05:28 AM    MCHC 33.0 01/30/2023 05:28 AM    RDW 13.2 01/30/2023 05:28 AM     01/30/2023 05:28 AM     BMP:    Lab Results   Component Value Date/Time    GLUCOSE 190 01/30/2023 05:28 AM     01/30/2023 05:28 AM    K 3.8 01/30/2023 05:28 AM    CL 96 01/30/2023 05:28 AM    CO2 26 01/30/2023 05:28 AM    ANIONGAP 9 01/30/2023 05:28 AM    BUN 17 01/30/2023 05:28 AM    CREATININE 0.93 01/30/2023 05:28 AM    BUNCRER 18 01/30/2023 05:28 AM    CALCIUM 8.4 01/30/2023 05:28 AM LABGLOM >60 01/30/2023 05:28 AM    GFRAA >60 03/01/2022 08:35 AM    GFR      03/01/2022 08:35 AM     HFP:    Lab Results   Component Value Date/Time    PROT 7.0 03/01/2022 08:35 AM     CMP:    Lab Results   Component Value Date/Time    GLUCOSE 190 01/30/2023 05:28 AM     01/30/2023 05:28 AM    K 3.8 01/30/2023 05:28 AM    CL 96 01/30/2023 05:28 AM    CO2 26 01/30/2023 05:28 AM    BUN 17 01/30/2023 05:28 AM    CREATININE 0.93 01/30/2023 05:28 AM    ANIONGAP 9 01/30/2023 05:28 AM    ALKPHOS 57 03/01/2022 08:35 AM    ALT 32 03/01/2022 08:35 AM    AST 31 03/01/2022 08:35 AM    BILITOT 0.89 03/01/2022 08:35 AM    LABALBU 3.2 01/30/2023 05:28 AM    ALBUMIN 1.6 03/01/2022 08:35 AM    LABGLOM >60 01/30/2023 05:28 AM    GFRAA >60 03/01/2022 08:35 AM    GFR      03/01/2022 08:35 AM    PROT 7.0 03/01/2022 08:35 AM    CALCIUM 8.4 01/30/2023 05:28 AM     PT/INR:  No results found for: PTINR, PROTIME, INR  PTT: No results found for: APTT  FLP:    Lab Results   Component Value Date/Time    CHOL 120 03/01/2022 08:35 AM    CHOL 168 10/12/2016 12:00 AM    TRIG 154 03/01/2022 08:35 AM    HDL 33 03/01/2022 08:35 AM     U/A:    Lab Results   Component Value Date/Time    COLORU dark yellow/orange 05/01/2021 10:46 AM    COLORU YELLOW 03/16/2019 09:25 PM    TURBIDITY CLEAR 03/16/2019 09:25 PM    SPECGRAV 1.020 05/01/2021 10:46 AM    SPECGRAV 1.015 03/16/2019 09:25 PM    HGBUR NEGATIVE 03/16/2019 09:25 PM    PHUR 6.0 05/01/2021 10:46 AM    PHUR 6.5 03/16/2019 09:25 PM    PROTEINU 30 05/01/2021 10:46 AM    PROTEINU TRACE 03/16/2019 09:25 PM    GLUCOSEU 250 05/01/2021 10:46 AM    GLUCOSEU NEGATIVE 03/16/2019 09:25 PM    KETUA neg 05/01/2021 10:46 AM    KETUA NEGATIVE 03/16/2019 09:25 PM    BILIRUBINUR neg 05/01/2021 10:46 AM    UROBILINOGEN Normal 03/16/2019 09:25 PM    NITRU NEGATIVE 03/16/2019 09:25 PM    LEUKOCYTESUR neg 05/01/2021 10:46 AM    LEUKOCYTESUR NEGATIVE 03/16/2019 09:25 PM     TSH:    Lab Results   Component Value Date/Time    TSH 1.71 03/21/2016 12:00 AM        Radiology:  CT ABDOMEN PELVIS WO CONTRAST Additional Contrast? None    Result Date: 1/28/2023  1. Left hydronephrosis secondary to a 17 mm calculus in the left renal pelvis. Other nonobstructing calculi left kidney. 2.  Dilated fat containing inguinal rings without strangulation. Mild right inguinal adenopathy. 3.  Atherosclerotic disease. Other incidental findings as above. Consultations:    Consults:     Final Specialist Recommendations/Findings:   IP CONSULT TO INTERNAL MEDICINE  IP CONSULT TO INFECTIOUS DISEASES  IP CONSULT TO UROLOGY      The patient was seen and examined on day of discharge and this discharge summary is in conjunction with any daily progress note from day of discharge. Discharge plan:     Disposition: Home    Physician Follow Up:     RILEY Horne - [facility]  [address] [place] [id number] [phone]    Schedule an appointment as soon as possible for a visit in 1 week(s)  hospital followup    Tracy Beck MD  [address] [place] [address]  [phone]    Schedule an appointment as soon as possible for a visit in 1 week(s)  hospital followup    Sabas Oseguera MD  [address]  [phone]    Go on 2/9/2023         Requiring Further Evaluation/Follow Up POST HOSPITALIZATION/Incidental Findings: none    Diet: regular diet    Activity: As tolerated   Instructions to Patient: see urology/pcp outpatient.      Discharge Medications:      Medication List        START taking these medications      cephALEXin 500 MG capsule  Commonly known as: KEFLEX  Take 1 capsule by mouth 4 times daily for 7 days            CHANGE how you take these medications      atenolol 100 MG tablet  Commonly known as: TENORMIN  TAKE 1 TABLET DAILY  What changed:   how much to take  how to take this  when to take this     colesevelam 625 MG tablet  Commonly known as: WELCHOL  TAKE 1 TABLET TWICE A DAY WITH MEALS  What changed: See the new instructions. glimepiride 2 MG tablet  Commonly known as: AMARYL  TAKE 1 TABLET EVERY MORNING BEFORE BREAKFAST  What changed: See the new instructions. simvastatin 20 MG tablet  Commonly known as: ZOCOR  TAKE 1 TABLET EVERY EVENING  What changed: See the new instructions. CONTINUE taking these medications      aspirin 81 MG EC tablet     Lauren Microlet Lancets Misc  1 applicator by Does not apply route 2 times daily DX: DM E11.9     Fish Oil 1200 MG Caps     metFORMIN 1000 MG tablet  Commonly known as: GLUCOPHAGE  Take 1 tab po bid with meals            STOP taking these medications      amLODIPine-benazepril 5-20 MG per capsule  Commonly known as: LOTREL     blood glucose test strips strip  Commonly known as: Lauren Contour Next Test     hydroCHLOROthiazide 25 MG tablet  Commonly known as: HYDRODIURIL     tamsulosin 0.4 MG capsule  Commonly known as: FLOMAX               Where to Get Your Medications        These medications were sent to [address] [place] [address], [facility] [address] [facility] [phone] - F [phone]  [address] [place] [id number]      Phone: [phone]   cephALEXin 500 MG capsule         No discharge procedures on file. Time Spent on discharge is  37 mins in patient examination, evaluation, counseling as well as medication reconciliation, prescriptions for required medications, discharge plan and follow up. Electronically signed by   Jennifer Sheikh DO  1/31/2023  9:19 PM      Thank you Dr. Smiley Johnson, APRN - CNP for the opportunity to be involved in this patient's care.

## 2023-02-01 NOTE — PROGRESS NOTES
Visit Information    Have you changed or started any medications since your last visit including any over-the-counter medicines, vitamins, or herbal medicines? no   Have you stopped taking any of your medications? Is so, why? -  no  Are you having any side effects from any of your medications? - no    Have you seen any other physician or provider since your last visit?  no   Have you had any other diagnostic tests since your last visit?  no   Have you been seen in the emergency room and/or had an admission in a hospital since we last saw you?  no   Have you had your routine dental cleaning in the past 6 months?  no     Do you have an active MyChart account? If no, what is the barrier?   Yes    Patient Care Team:  RILEY Pagan CNP as PCP - General (Family Nurse Practitioner)  RILEY Pagan CNP as PCP - Empaneled Provider    Medical History Review  Past Medical, Family, and Social History reviewed and  contribute to the patient presenting condition    Health Maintenance   Topic Date Due    Shingles vaccine (2 of 3) 12/15/2016    Diabetic foot exam  01/14/2022    Depression Screen  02/28/2023    Diabetic Alb to Cr ratio (uACR) test  03/01/2023    Lipids  03/01/2023    Annual Wellness Visit (AWV)  03/01/2023    DTaP/Tdap/Td vaccine (1 - Tdap) 02/28/2023 (Originally 3/15/1969)    COVID-19 Vaccine (1) 08/11/2025 (Originally 1950)    Diabetic retinal exam  07/29/2023    Colorectal Cancer Screen  10/17/2023    A1C test (Diabetic or Prediabetic)  01/26/2024    GFR test (Diabetes, CKD 3-4, OR last GFR 15-59)  01/30/2024    Flu vaccine  Completed    Pneumococcal 65+ years Vaccine  Completed    AAA screen  Completed    Hepatitis C screen  Completed    Hepatitis A vaccine  Aged Out    Hib vaccine  Aged Out    Meningococcal (ACWY) vaccine  Aged Out

## 2023-02-01 NOTE — PROGRESS NOTES
[facility] [place]  [address]  [place], [address]  [phone]      Terra Leon is a 67 y.o. male who presents today for his  medicalconditions/complaints as noted below. Terra Leon is c/o of Follow-Up from Hospital ([facility] 01/27-01/30) and Cellulitis (Has kidney stone procedure 02/09, has appt with podiatrist, ref for inf disease and ref for neuropathy. Told to hold HCTZ, ASA, flomax and glimiperide, fish oil, seemed concerned with kidneys)  . HPI:    HPI  Pt here today for hospital f/u. Was admitted for cellulitis on right lower leg and SARA> states he also has lithotripsy being done 2=-9=22 with dr Liliana Leija. States in hospital he was told to stop HCTZ, lotrel, ASA, flomax and glimeperide d/t kidney issues. BG now at home for past two days has been around 200. Not taking any DM meds right now. BP at home been around 130-140/80's. Only taking atenolol. States he started on oral keflex QID 2 days ago and leg has improved a lot with redness and warmth. Still has swelling and mild discomfort. Denies fever, or chills.  Unsure what meds he should continue or restart>    Past Medical History:   Diagnosis Date    Atherosclerosis of aortic bifurcation and common iliac arteries ([address] 12/13/2018    H/O cardiac catheterization 2003    Hyperlipidemia     on med    Hypertension     on med    Skin cancer     Type 2 diabetes mellitus ([address]       Past Surgical History:   Procedure Laterality Date    CARDIAC CATHETERIZATION  several    COLONOSCOPY      INGUINAL HERNIA REPAIR Right     PENIS SURGERY      SKIN CANCER EXCISION      TIBIA FRACTURE SURGERY Left     UPPER GASTROINTESTINAL ENDOSCOPY       Family History   Problem Relation Age of Onset    Kidney Cancer Father     COPD Mother     Other Mother         aneurysm    No Known Problems Sister      Social History     Tobacco Use    Smoking status: Former     Packs/day: 1.00     Years: 20.00     Pack years: 20.00     Types: Cigarettes Quit date: 1980     Years since quittin.0    Smokeless tobacco: Never   Substance Use Topics    Alcohol use: No      Current Outpatient Medications   Medication Sig Dispense Refill    Tirzepatide (MOUNJARO) 2.5 MG/0.5ML SOPN SC injection Inject 0.5 mLs into the skin once a week 2 mL 0    amLODIPine (NORVASC) 10 MG tablet Take 1 tablet by mouth daily 90 tablet 1    cephALEXin (KEFLEX) 500 MG capsule Take 1 capsule by mouth 4 times daily for 7 days 28 capsule 0    atenolol (TENORMIN) 100 MG tablet TAKE 1 TABLET DAILY (Patient taking differently: Take 100 mg by mouth daily TAKE 1 TABLET DAILY) 90 tablet 3    metFORMIN (GLUCOPHAGE) 1000 MG tablet Take 1 tab po bid with meals (Patient not taking: Reported on 2023) 180 tablet 3    simvastatin (ZOCOR) 20 MG tablet TAKE 1 TABLET EVERY EVENING (Patient taking differently: Take 20 mg by mouth nightly) 90 tablet 3    colesevelam (WELCHOL) 625 MG tablet TAKE 1 TABLET TWICE A DAY WITH MEALS (Patient taking differently: Take 625 mg by mouth 2 times daily (with meals)) 180 tablet 3    glimepiride (AMARYL) 2 MG tablet TAKE 1 TABLET EVERY MORNING BEFORE BREAKFAST (Patient not taking: Reported on 2023) 90 tablet 3    aspirin 81 MG EC tablet Take 81 mg by mouth daily (Patient not taking: Reported on 2023)      [employer] MICROLET LANCETS MISC 1 applicator by Does not apply route 2 times daily DX: DM E11.9 60 each 5    Omega-3 Fatty Acids (FISH OIL) 1200 MG CAPS Take 1,200 mg by mouth daily. (Patient not taking: Reported on 2023)       No current facility-administered medications for this visit.      No Known Allergies    Health Maintenance   Topic Date Due    Shingles vaccine (2 of 3) 12/15/2016    Diabetic foot exam  2022    Depression Screen  2023    Lipids  2023    Annual Wellness Visit (AWV)  2023    DTaP/Tdap/Td vaccine (1 - Tdap) 2023 (Originally 3/15/1969)    COVID-19 Vaccine (1) 2025 (Originally 1950)    Diabetic retinal exam  07/29/2023    Colorectal Cancer Screen  10/17/2023    GFR test (Diabetes, CKD 3-4, OR last GFR 15-59)  01/30/2024    A1C test (Diabetic or Prediabetic)  02/01/2024    Diabetic Alb to Cr ratio (uACR) test  02/01/2024    Flu vaccine  Completed    Pneumococcal 65+ years Vaccine  Completed    AAA screen  Completed    Hepatitis C screen  Completed    Hepatitis A vaccine  Aged Out    Hib vaccine  Aged Out    Meningococcal (ACWY) vaccine  Aged Out       Subjective:      Review of Systems   Constitutional:  Positive for activity change. Negative for appetite change, chills, diaphoresis, fatigue and fever. Eyes:  Negative for visual disturbance. Respiratory:  Negative for cough, chest tightness, shortness of breath and wheezing. Cardiovascular:  Negative for chest pain, palpitations and leg swelling. Gastrointestinal:  Negative for abdominal pain, nausea and vomiting. Endocrine: Negative for polydipsia, polyphagia and polyuria. Genitourinary:  Negative for decreased urine volume and difficulty urinating. Skin:  Positive for color change and wound. Negative for pallor and rash. Neurological:  Negative for dizziness, weakness, light-headedness, numbness and headaches. Hematological:  Negative for adenopathy. Psychiatric/Behavioral:  Negative for sleep disturbance. Objective:      Physical Exam  Vitals and nursing note reviewed. Exam conducted with a chaperone present. Constitutional:       General: He is not in acute distress. Appearance: Normal appearance. He is well-developed. He is obese. He is not ill-appearing or diaphoretic. HENT:      Head: Normocephalic and atraumatic. Mouth/Throat:      Mouth: Mucous membranes are moist.   Eyes:      Conjunctiva/sclera: Conjunctivae normal.   Cardiovascular:      Rate and Rhythm: Normal rate and regular rhythm. Pulses: Normal pulses. Heart sounds: Normal heart sounds. No murmur heard.      Comments: No LE edema  Pulmonary:      Effort: Pulmonary effort is normal.      Breath sounds: Normal breath sounds. Musculoskeletal:      Cervical back: Neck supple. Skin:     General: Skin is warm and dry. Comments: Mild erythema to RLE with large dressing on lateral aspect of right shin, mod. Swelling still present. Intact pedal pulses and cap refill. Neurological:      General: No focal deficit present. Mental Status: He is alert and oriented to person, place, and time. Mental status is at baseline. Psychiatric:         Mood and Affect: Mood normal.         Behavior: Behavior normal.         Thought Content: Thought content normal.         Judgment: Judgment normal.       Assessment:       Diagnosis Orders   1. Cellulitis of right lower extremity        2. Type 2 diabetes mellitus without complication, without long-term current use of insulin ([facility])  Tirzepatide (MOUNJARO) 2.5 MG/0.5ML SOPN SC injection    POCT glycosylated hemoglobin (Hb A1C)    POCT microalbumin      3. Essential hypertension  amLODIPine (NORVASC) 10 MG tablet      4.  Hospital discharge follow-up  [place] DISCHARGE MEDS RECONCILED W/ CURRENT OUTPATIENT MED LIST        Lab Results   Component Value Date    WBC 8.0 01/30/2023    HGB 12.0 (L) 01/30/2023    HCT 36.4 (L) 01/30/2023    MCV 97.8 01/30/2023     01/30/2023       Chemistry        Component Value Date/Time     (L) 01/30/2023 0528    K 3.8 01/30/2023 0528    CL 96 (L) 01/30/2023 0528    CO2 26 01/30/2023 0528    BUN 17 01/30/2023 0528    CREATININE 0.93 01/30/2023 0528        Component Value Date/Time    CALCIUM 8.4 (L) 01/30/2023 0528    ALKPHOS 57 03/01/2022 0835    AST 31 03/01/2022 0835    ALT 32 03/01/2022 0835    BILITOT 0.89 03/01/2022 0835        Lab Results   Component Value Date/Time    MG 1.7 01/30/2023 05:28 AM     Results for orders placed or performed in visit on 02/01/23   POCT glycosylated hemoglobin (Hb A1C)   Result Value Ref Range    Hemoglobin A1C 6.8 % POCT microalbumin   Result Value Ref Range    Microalb, Ur 150     Creatinine Ur POCT 200     Microalbumin Creatinine Ratio       Wt Readings from Last 3 Encounters:   02/01/23 293 lb 6.4 oz (133.1 kg)   01/29/23 288 lb (130.6 kg)   10/26/22 286 lb (129.7 kg)     BP Readings from Last 3 Encounters:   02/01/23 138/70   01/30/23 123/68   10/26/22 138/86         Plan:      Return in about 1 month (around 3/1/2023) for amv-can be phone vists if desired. Orders Placed This Encounter   Procedures    POCT glycosylated hemoglobin (Hb A1C)    POCT microalbumin    [place] DISCHARGE MEDS RECONCILED W/ CURRENT OUTPATIENT MED LIST     Orders Placed This Encounter   Medications    Tirzepatide (MOUNJARO) 2.5 MG/0.5ML SOPN SC injection     Sig: Inject 0.5 mLs into the skin once a week     Dispense:  2 mL     Refill:  0    amLODIPine (NORVASC) 10 MG tablet     Sig: Take 1 tablet by mouth daily     Dispense:  90 tablet     Refill:  1     Dm:  Strict diabetic diet advised for now  Would like to avoid restarting metformin/glimeperide with Ellis hx ( although normal now) will try for [facility] for now, discussed SE profile  Monitor feet daily and continue with podiatry  Continue to monitor BG closely at home for now also and call within 20 days after starting new med with readings    Leg:  Improving daily  Continue rest of keflex abx  Continue with f/u plan to see ID    Htn:  Goal BP ,130/80  Add back plain amlodipine and hold on ACE for now  May also try for farxiga if needed in future if cost realistic  Continue BB daily  Call with BP readings in 10 days  Reduce salt/caffeine within diet    Patient given educational materials - see patient instructions. Discussed use,benefit, and side effects of prescribed medications. All patient questions answered. Pt voiced understanding. Reviewed health maintenance. Instructed to continue currentmedications, diet and exercise.     Electronically signed by RILEY Bertrand - LUIS,CNP on 2/1/2023 at 11:17 AM

## 2023-02-06 ENCOUNTER — PATIENT MESSAGE (OUTPATIENT)
Dept: FAMILY MEDICINE CLINIC | Age: 73
End: 2023-02-06

## 2023-02-06 DIAGNOSIS — E11.9 TYPE 2 DIABETES MELLITUS WITHOUT COMPLICATION, WITHOUT LONG-TERM CURRENT USE OF INSULIN (HCC): ICD-10-CM

## 2023-02-06 NOTE — TELEPHONE ENCOUNTER
From: Valdemar Lozoya  To: Marlon Jovel  Sent: 2/6/2023 9:31 AM EST  Subject: Is prescription     I looked it up to see what it was for I don't have high blood pressure no kidney infection So I was quite surprised when it showed up Saturday

## 2023-02-10 ENCOUNTER — TELEPHONE (OUTPATIENT)
Dept: FAMILY MEDICINE CLINIC | Age: 73
End: 2023-02-10

## 2023-02-10 ENCOUNTER — NURSE ONLY (OUTPATIENT)
Dept: FAMILY MEDICINE CLINIC | Age: 73
End: 2023-02-10

## 2023-02-10 VITALS — OXYGEN SATURATION: 97 % | DIASTOLIC BLOOD PRESSURE: 84 MMHG | SYSTOLIC BLOOD PRESSURE: 154 MMHG | HEART RATE: 63 BPM

## 2023-02-10 DIAGNOSIS — Z01.30 BLOOD PRESSURE CHECK: Primary | ICD-10-CM

## 2023-02-15 ENCOUNTER — OFFICE VISIT (OUTPATIENT)
Dept: INFECTIOUS DISEASES | Age: 73
End: 2023-02-15
Payer: MEDICARE

## 2023-02-15 VITALS
DIASTOLIC BLOOD PRESSURE: 85 MMHG | TEMPERATURE: 97.2 F | BODY MASS INDEX: 37.51 KG/M2 | HEIGHT: 73 IN | HEART RATE: 70 BPM | WEIGHT: 283 LBS | SYSTOLIC BLOOD PRESSURE: 140 MMHG

## 2023-02-15 DIAGNOSIS — L03.115 CELLULITIS OF RIGHT LOWER EXTREMITY WITHOUT FOOT: Primary | ICD-10-CM

## 2023-02-15 PROCEDURE — 3074F SYST BP LT 130 MM HG: CPT | Performed by: INTERNAL MEDICINE

## 2023-02-15 PROCEDURE — 1123F ACP DISCUSS/DSCN MKR DOCD: CPT | Performed by: INTERNAL MEDICINE

## 2023-02-15 PROCEDURE — 99213 OFFICE O/P EST LOW 20 MIN: CPT | Performed by: INTERNAL MEDICINE

## 2023-02-15 PROCEDURE — 3078F DIAST BP <80 MM HG: CPT | Performed by: INTERNAL MEDICINE

## 2023-02-15 ASSESSMENT — ENCOUNTER SYMPTOMS
GASTROINTESTINAL NEGATIVE: 1
RESPIRATORY NEGATIVE: 1

## 2023-02-15 NOTE — PROGRESS NOTES
history, and I have updated the database accordingly. Past Medical History:     Past Medical History:   Diagnosis Date    Atherosclerosis of aortic bifurcation and common iliac arteries ([facility] 12/13/2018    H/O cardiac catheterization 2003    Hyperlipidemia     on med    Hypertension     on med    Kidney stone     Skin cancer     Type 2 diabetes mellitus ([facility])      Medications:     Current Outpatient Medications   Medication Sig Dispense Refill    amLODIPine (NORVASC) 10 MG tablet Take 1 tablet by mouth daily 90 tablet 1    atenolol (TENORMIN) 100 MG tablet TAKE 1 TABLET DAILY (Patient taking differently: Take 100 mg by mouth daily TAKE 1 TABLET DAILY) 90 tablet 3    metFORMIN (GLUCOPHAGE) 1000 MG tablet Take 1 tab po bid with meals 180 tablet 3    aspirin 81 MG EC tablet Take 81 mg by mouth daily      Omega-3 Fatty Acids (FISH OIL) 1200 MG CAPS Take 1,200 mg by mouth daily      Tirzepatide (MOUNJARO) 2.5 MG/0.5ML SOPN SC injection Inject 0.5 mLs into the skin once a week 6 mL 0    glimepiride (AMARYL) 2 MG tablet TAKE 1 TABLET EVERY MORNING BEFORE BREAKFAST 90 tablet 3    colesevelam (WELCHOL) 625 MG tablet Take 1 tablet by mouth 2 times daily (with meals) 180 tablet 3    simvastatin (ZOCOR) 20 MG tablet Take 1 tablet by mouth nightly 90 tablet 3    GILDARDO MICROLET LANCETS MISC 1 applicator by Does not apply route 2 times daily DX: DM E11.9 60 each 5     No current facility-administered medications for this visit. Allergies:   Patient has no known allergies. Review of Systems:   Review of Systems   Constitutional: Negative. HENT: Negative. Respiratory: Negative. Cardiovascular: Negative. Gastrointestinal: Negative. Genitourinary: Negative. Musculoskeletal: Negative. Neurological: Negative. Psychiatric/Behavioral: Negative.         Physical Examination :   BP (!) 140/85 (Site: Right Upper Arm)   Pulse 70   Temp 97.2 °F (36.2 °C)   Ht 6' 1\" (1.854 m)   Wt 283 lb (128.4 kg)   BMI

## 2023-02-21 RX ORDER — TIRZEPATIDE 2.5 MG/.5ML
2.5 INJECTION, SOLUTION SUBCUTANEOUS WEEKLY
Qty: 6 ML | Refills: 0 | Status: SHIPPED | OUTPATIENT
Start: 2023-02-21

## 2023-02-24 DIAGNOSIS — E11.9 TYPE 2 DIABETES MELLITUS WITHOUT COMPLICATION, WITHOUT LONG-TERM CURRENT USE OF INSULIN (HCC): ICD-10-CM

## 2023-02-24 RX ORDER — TIRZEPATIDE 2.5 MG/.5ML
2.5 INJECTION, SOLUTION SUBCUTANEOUS WEEKLY
Qty: 6 ML | Refills: 0 | OUTPATIENT
Start: 2023-02-24

## 2023-02-27 DIAGNOSIS — E11.9 TYPE 2 DIABETES MELLITUS WITHOUT COMPLICATION, WITHOUT LONG-TERM CURRENT USE OF INSULIN (HCC): ICD-10-CM

## 2023-02-27 RX ORDER — TIRZEPATIDE 2.5 MG/.5ML
2.5 INJECTION, SOLUTION SUBCUTANEOUS WEEKLY
Qty: 6 ML | Refills: 0 | OUTPATIENT
Start: 2023-02-27

## 2023-03-02 ENCOUNTER — OFFICE VISIT (OUTPATIENT)
Dept: FAMILY MEDICINE CLINIC | Age: 73
End: 2023-03-02
Payer: MEDICARE

## 2023-03-02 VITALS
SYSTOLIC BLOOD PRESSURE: 128 MMHG | HEART RATE: 72 BPM | DIASTOLIC BLOOD PRESSURE: 72 MMHG | BODY MASS INDEX: 38.25 KG/M2 | WEIGHT: 288.6 LBS | TEMPERATURE: 97.2 F | OXYGEN SATURATION: 96 % | HEIGHT: 73 IN

## 2023-03-02 DIAGNOSIS — E11.9 TYPE 2 DIABETES MELLITUS WITHOUT COMPLICATION, WITHOUT LONG-TERM CURRENT USE OF INSULIN (HCC): ICD-10-CM

## 2023-03-02 DIAGNOSIS — Z00.00 MEDICARE ANNUAL WELLNESS VISIT, SUBSEQUENT: Primary | ICD-10-CM

## 2023-03-02 PROCEDURE — 3074F SYST BP LT 130 MM HG: CPT | Performed by: NURSE PRACTITIONER

## 2023-03-02 PROCEDURE — 1123F ACP DISCUSS/DSCN MKR DOCD: CPT | Performed by: NURSE PRACTITIONER

## 2023-03-02 PROCEDURE — 3044F HG A1C LEVEL LT 7.0%: CPT | Performed by: NURSE PRACTITIONER

## 2023-03-02 PROCEDURE — G0439 PPPS, SUBSEQ VISIT: HCPCS | Performed by: NURSE PRACTITIONER

## 2023-03-02 PROCEDURE — 3078F DIAST BP <80 MM HG: CPT | Performed by: NURSE PRACTITIONER

## 2023-03-02 RX ORDER — TIRZEPATIDE 2.5 MG/.5ML
2.5 INJECTION, SOLUTION SUBCUTANEOUS WEEKLY
Qty: 6 ML | Refills: 0 | Status: SHIPPED | OUTPATIENT
Start: 2023-03-02

## 2023-03-02 RX ORDER — SIMVASTATIN 20 MG
20 TABLET ORAL NIGHTLY
Qty: 90 TABLET | Refills: 3 | Status: SHIPPED | OUTPATIENT
Start: 2023-03-02

## 2023-03-02 RX ORDER — COLESEVELAM 180 1/1
625 TABLET ORAL 2 TIMES DAILY WITH MEALS
Qty: 180 TABLET | Refills: 3 | Status: SHIPPED | OUTPATIENT
Start: 2023-03-02

## 2023-03-02 RX ORDER — GLIMEPIRIDE 2 MG/1
TABLET ORAL
Qty: 90 TABLET | Refills: 3 | Status: SHIPPED | OUTPATIENT
Start: 2023-03-02

## 2023-03-02 ASSESSMENT — PATIENT HEALTH QUESTIONNAIRE - PHQ9
SUM OF ALL RESPONSES TO PHQ QUESTIONS 1-9: 0
2. FEELING DOWN, DEPRESSED OR HOPELESS: 0
SUM OF ALL RESPONSES TO PHQ QUESTIONS 1-9: 0
SUM OF ALL RESPONSES TO PHQ QUESTIONS 1-9: 0
1. LITTLE INTEREST OR PLEASURE IN DOING THINGS: 0
SUM OF ALL RESPONSES TO PHQ QUESTIONS 1-9: 0
SUM OF ALL RESPONSES TO PHQ9 QUESTIONS 1 & 2: 0

## 2023-03-02 ASSESSMENT — LIFESTYLE VARIABLES
HOW OFTEN DO YOU HAVE A DRINK CONTAINING ALCOHOL: NEVER
HOW MANY STANDARD DRINKS CONTAINING ALCOHOL DO YOU HAVE ON A TYPICAL DAY: PATIENT DOES NOT DRINK

## 2023-03-02 NOTE — PATIENT INSTRUCTIONS
Learning About Being Active as an Older Adult  Why is being active important as you get older? Being active is one of the best things you can do for your health. And it's never too late to start. Being active--or getting active, if you aren't already--has definite benefits. It can:  Give you more energy,  Keep your mind sharp. Improve balance to reduce your risk of falls. Help you manage chronic illness with fewer medicines. No matter how old you are, how fit you are, or what health problems you have, there is a form of activity that will work for you. And the more physical activity you can do, the better your overall health will be. What kinds of activity can help you stay healthy? Being more active will make your daily activities easier. Physical activity includes planned exercise and things you do in daily life. There are four types of activity:  Aerobic. Doing aerobic activity makes your heart and lungs strong. Includes walking, dancing, and gardening. Aim for at least 2½ hours spread throughout the week. It improves your energy and can help you sleep better. Muscle-strengthening. This type of activity can help maintain muscle and strengthen bones. Includes climbing stairs, using resistance bands, and lifting or carrying heavy loads. Aim for at least twice a week. It can help protect the knees and other joints. Stretching. Stretching gives you better range of motion in joints and muscles. Includes upper arm stretches, calf stretches, and gentle yoga. Aim for at least twice a week, preferably after your muscles are warmed up from other activities. It can help you function better in daily life. Balancing. This helps you stay coordinated and have good posture. Includes heel-to-toe walking, azucena chi, and certain types of yoga. Aim for at least 3 days a week. It can reduce your risk of falling.   Even if you have a hard time meeting the recommendations, it's better to be more active than less active. All activity done in each category counts toward your weekly total. You'd be surprised how daily things like carrying groceries, keeping up with grandchildren, and taking the stairs can add up. What keeps you from being active? If you've had a hard time being more active, you're not alone. Maybe you remember being able to do more. Or maybe you've never thought of yourself as being active. It's frustrating when you can't do the things you want. Being more active can help. What's holding you back? Getting started. Have a goal, but break it into easy tasks. Small steps build into big accomplishments. Staying motivated. If you feel like skipping your activity, remember your goal. Maybe you want to move better and stay independent. Every activity gets you one step closer. Not feeling your best.  Start with 5 minutes of an activity you enjoy. Prove to yourself you can do it. As you get comfortable, increase your time. You may not be where you want to be. But you're in the process of getting there. Everyone starts somewhere. How can you find safe ways to stay active? Talk with your doctor about any physical challenges you're facing. Make a plan with your doctor if you have a health problem or aren't sure how to get started with activity. If you're already active, ask your doctor if there is anything you should change to stay safe as your body and health change. If you tend to feel dizzy after you take medicine, avoid activity at that time. Try being active before you take your medicine. This will reduce your risk of falls. If you plan to be active at home, make sure to clear your space before you get started. Remove things like TV cords, coffee tables, and throw rugs. It's safest to have plenty of space to move freely. The key to getting more active is to take it slow and steady. Try to improve only a little bit at a time.  Pick just one area to improve on at first. And if an activity hurts, stop and talk to your doctor. Where can you learn more? Go to http://www.brice.com/ and enter P600 to learn more about \"Learning About Being Active as an Older Adult. \"  Current as of: October 10, 2022               Content Version: 13.5  © 4944-0131 Healthwise, Incorporated. Care instructions adapted under license by [facility] ([facility]). If you have questions about a medical condition or this instruction, always ask your healthcare professional. [place] [address] any warranty or liability for your use of this information. Advance Directives: Care Instructions  Overview  An advance directive is a legal way to state your wishes at the end of your life. It tells your family and your doctor what to do if you can't say what you want. There are two main types of advance directives. You can change them any time your wishes change. Living will. This form tells your family and your doctor your wishes about life support and other treatment. The form is also called a declaration. Medical power of . This form lets you name a person to make treatment decisions for you when you can't speak for yourself. This person is called a health care agent (health care proxy, health care surrogate). The form is also called a durable power of  for health care. If you do not have an advance directive, decisions about your medical care may be made by a family member, or by a doctor or a  who doesn't know you. It may help to think of an advance directive as a gift to the people who care for you. If you have one, they won't have to make tough decisions by themselves. For more information, including forms for your state, see the [address] website (www.caringinfo.org/planning/advance-directives/). Follow-up care is a key part of your treatment and safety. Be sure to make and go to all appointments, and call your doctor if you are having problems.  It's also a good idea to know your test results and keep a list of the medicines you take. What should you include in an advance directive? Many states have a unique advance directive form. (It may ask you to address specific issues.) Or you might use a universal form that's approved by many states. If your form doesn't tell you what to address, it may be hard to know what to include in your advance directive. Use the questions below to help you get started. Who do you want to make decisions about your medical care if you are not able to? What life-support measures do you want if you have a serious illness that gets worse over time or can't be cured? What are you most afraid of that might happen? (Maybe you're afraid of having pain, losing your independence, or being kept alive by machines.)  Where would you prefer to die? (Your home? A hospital? A nursing home?)  Do you want to donate your organs when you die? Do you want certain Tenriism practices performed before you die? When should you call for help? Be sure to contact your doctor if you have any questions. Where can you learn more? Go to http://Cleo.brice.com/ and enter R264 to learn more about \"Advance Directives: Care Instructions. \"  Current as of: June 16, 2022               Content Version: 13.5  © 9625-9324 Healthwise, Incorporated. Care instructions adapted under license by [facility] ([facility]). If you have questions about a medical condition or this instruction, always ask your healthcare professional. [place] [address] any warranty or liability for your use of this information. Starting a Weight Loss Plan: Care Instructions  Overview     If you're thinking about losing weight, it can be hard to know where to start. Your doctor can help you set up a weight loss plan that best meets your needs. You may want to take a class on nutrition or exercise, or you could join a weight loss support group.  If you have questions about how to make changes to your eating or exercise habits, ask your doctor about seeing a registered dietitian or an exercise specialist.  It can be a big challenge to lose weight. But you don't have to make huge changes at once. Make small changes, and stick with them. When those changes become habit, add a few more changes. If you don't think you're ready to make changes right now, try to pick a date in the future. Make an appointment to see your doctor to discuss whether the time is right for you to start a plan. Follow-up care is a key part of your treatment and safety. Be sure to make and go to all appointments, and call your doctor if you are having problems. It's also a good idea to know your test results and keep a list of the medicines you take. How can you care for yourself at home? Set realistic goals. Many people expect to lose much more weight than is likely. A weight loss of 5% to 10% of your body weight may be enough to improve your health. Get family and friends involved to provide support. Talk to them about why you are trying to lose weight, and ask them to help. They can help by participating in exercise and having meals with you, even if they may be eating something different. Find what works best for you. If you do not have time or do not like to cook, a program that offers meal replacement bars or shakes may be better for you. Or if you like to prepare meals, finding a plan that includes daily menus and recipes may be best.  Ask your doctor about other health professionals who can help you achieve your weight loss goals. A dietitian can help you make healthy changes in your diet. An exercise specialist or  can help you develop a safe and effective exercise program.  A counselor or psychiatrist can help you cope with issues such as depression, anxiety, or family problems that can make it hard to focus on weight loss.   Consider joining a support group for people who are trying to lose weight. Your doctor can suggest groups in your area. Where can you learn more? Go to http://www.woods.com/ and enter U357 to learn more about \"Starting a Weight Loss Plan: Care Instructions. \"  Current as of: August 25, 2022               Content Version: 13.5  © 3754-2900 [employer]. Care instructions adapted under license by [facility] ([facility]). If you have questions about a medical condition or this instruction, always ask your healthcare professional. [place] [address] any warranty or liability for your use of this information. A Healthy Heart: Care Instructions  Your Care Instructions     Coronary artery disease, also called heart disease, occurs when a substance called plaque builds up in the vessels that supply oxygen-rich blood to your heart muscle. This can narrow the blood vessels and reduce blood flow. A heart attack happens when blood flow is completely blocked. A high-fat diet, smoking, and other factors increase the risk of heart disease. Your doctor has found that you have a chance of having heart disease. You can do lots of things to keep your heart healthy. It may not be easy, but you can change your diet, exercise more, and quit smoking. These steps really work to lower your chance of heart disease. Follow-up care is a key part of your treatment and safety. Be sure to make and go to all appointments, and call your doctor if you are having problems. It's also a good idea to know your test results and keep a list of the medicines you take. How can you care for yourself at home? Diet    Use less salt when you cook and eat. This helps lower your blood pressure. Taste food before salting. Add only a little salt when you think you need it.  With time, your taste buds will adjust to less salt.     Eat fewer snack items, fast foods, canned soups, and other high-salt, high-fat, processed foods.     Read food labels and try to avoid saturated and trans fats. They increase your risk of heart disease by raising cholesterol levels.     Limit the amount of solid fat-butter, margarine, and shortening-you eat. Use olive, peanut, or canola oil when you cook. Bake, broil, and steam foods instead of frying them.     Eat a variety of fruit and vegetables every day. Dark green, deep orange, red, or yellow fruits and vegetables are especially good for you. Examples include spinach, carrots, peaches, and berries.     Foods high in fiber can reduce your cholesterol and provide important vitamins and minerals. High-fiber foods include whole-grain cereals and breads, oatmeal, beans, brown rice, citrus fruits, and apples.     Eat lean proteins. Heart-healthy proteins include seafood, lean meats and poultry, eggs, beans, peas, nuts, seeds, and soy products.     Limit drinks and foods with added sugar. These include candy, desserts, and soda pop. Lifestyle changes    If your doctor recommends it, get more exercise. Walking is a good choice. Bit by bit, increase the amount you walk every day. Try for at least 30 minutes on most days of the week. You also may want to swim, bike, or do other activities.     Do not smoke. If you need help quitting, talk to your doctor about stop-smoking programs and medicines. These can increase your chances of quitting for good. Quitting smoking may be the most important step you can take to protect your heart. It is never too late to quit.     Limit alcohol to 2 drinks a day for men and 1 drink a day for women. Too much alcohol can cause health problems.     Manage other health problems such as diabetes, high blood pressure, and high cholesterol. If you think you may have a problem with alcohol or drug use, talk to your doctor. Medicines    Take your medicines exactly as prescribed. Call your doctor if you think you are having a problem with your medicine.     If your doctor recommends aspirin, take the amount directed each day.  Make sure you take aspirin and not another kind of pain reliever, such as acetaminophen (Tylenol). When should you call for help? Call 911 if you have symptoms of a heart attack. These may include:    Chest pain or pressure, or a strange feeling in the chest.     Sweating.     Shortness of breath.     Pain, pressure, or a strange feeling in the back, neck, jaw, or upper belly or in one or both shoulders or arms.     Lightheadedness or sudden weakness.     A fast or irregular heartbeat. After you call 911, the  may tell you to chew 1 adult-strength or 2 to 4 low-dose aspirin. Wait for an ambulance. Do not try to drive yourself. Watch closely for changes in your health, and be sure to contact your doctor if you have any problems. Where can you learn more? Go to http://Deep Domain.brice.com/ and enter F075 to learn more about \"A Healthy Heart: Care Instructions. \"  Current as of: September 7, 2022               Content Version: 13.5  © 2006-2022 Healthwise, [employer]. Care instructions adapted under license by [facility] ([facility]). If you have questions about a medical condition or this instruction, always ask your healthcare professional. [place] [address] any warranty or liability for your use of this information. Personalized Preventive Plan for Christopher Tadeo - 3/2/2023  Medicare offers a range of preventive health benefits. Some of the tests and screenings are paid in full while other may be subject to a deductible, co-insurance, and/or copay. Some of these benefits include a comprehensive review of your medical history including lifestyle, illnesses that may run in your family, and various assessments and screenings as appropriate. After reviewing your medical record and screening and assessments performed today your provider may have ordered immunizations, labs, imaging, and/or referrals for you.   A list of these orders (if applicable) as well as your Preventive Care list are included within your After Visit Summary for your review. Other Preventive Recommendations:    A preventive eye exam performed by an eye specialist is recommended every 1-2 years to screen for glaucoma; cataracts, macular degeneration, and other eye disorders. A preventive dental visit is recommended every 6 months. Try to get at least 150 minutes of exercise per week or 10,000 steps per day on a pedometer . Order or download the FREE \"Exercise & Physical Activity: Your Everyday Guide\" from [employer]. Call 2-712.634.4818 or search [employer] online. You need 8082-1020 mg of calcium and 3817-7540 IU of vitamin D per day. It is possible to meet your calcium requirement with diet alone, but a vitamin D supplement is usually necessary to meet this goal.  When exposed to the sun, use a sunscreen that protects against both UVA and UVB radiation with an SPF of 30 or greater. Reapply every 2 to 3 hours or after sweating, drying off with a towel, or swimming. Always wear a seat belt when traveling in a car. Always wear a helmet when riding a bicycle or motorcycle.

## 2023-03-02 NOTE — PROGRESS NOTES
Medicare Annual Wellness Visit    Loyda Perez is here for Medicare AWV ( ) and Diabetes (Last shot of mounjaro was yesterday. Has to pay same price for 1 month or 3 month supply. Hasnt seen a huge difference. Pt brought BS log)    Assessment & Plan   Medicare annual wellness visit, subsequent  Ok for shingles vaccine at pharmacy  Patient advised to follow a low carb, low sugar, healthy fat ( avocados, nuts, olive oil etc.) diet and 150 minutes of cardiovascular exercise per week   Continue other same meds  Type 2 diabetes mellitus without complication, without long-term current use of insulin (HCC)  Stable  Home BG readings improved and lost 5 lbs, has had some slight nausea and wants to continue at lowest dose as he often has Bg <100  Continue to  monitor closely  Recheck a1c 3 months    -     Tirzepatide (MOUNJARO) 2.5 MG/0.5ML SOPN SC injection; Inject 0.5 mLs into the skin once a week, Disp-6 mL, R-0Discussed dose with pt and he wants to remain on this dose for 3 months as BG < 120Normal  -     glimepiride (AMARYL) 2 MG tablet; TAKE 1 TABLET EVERY MORNING BEFORE BREAKFAST, Disp-90 tablet, R-3Normal      Recommendations for Preventive Services Due: see orders and patient instructions/AVS.  Recommended screening schedule for the next 5-10 years is provided to the patient in written form: see Patient Instructions/AVS.     Return in 3 months (on 6/2/2023) for Medicare Annual Wellness Visit in 1 year, return for diabetes management. Subjective   Doing well on 2.5 mg mounjaro weekly. BG running < 140 consistently since mid February. Checking daily along with BP. No symptoms with hypoglycemia. Had lithotripsy recently and kidney stent removed. Patient's complete Health Risk Assessment and screening values have been reviewed and are found in Flowsheets. The following problems were reviewed today and where indicated follow up appointments were made and/or referrals ordered.     Positive Risk Factor Screenings with Interventions:                 Weight and Activity:  Physical Activity: Inactive    Days of Exercise per Week: 0 days    Minutes of Exercise per Session: 0 min     On average, how many days per week do you engage in moderate to strenuous exercise (like a brisk walk)?: 0 days  Have you lost any weight without trying in the past 3 months?: No  Body mass index: (!) 38.07      Inactivity Interventions:  Patient comments: recently had sciatic problem and trying to get back into exercise  Obesity Interventions:  Patient declines any further evaluation or treatment             Safety:  Do you have working smoke detectors?: (!) No  Interventions:  Patient declined any further interventions or treatment     Advanced Directives:  Do you have a Living Will?: (!) No    Intervention:  has NO advanced directive - not interested in additional information                       Objective   Vitals:    03/02/23 0857   BP: 128/72   Site: Right Upper Arm   Position: Sitting   Cuff Size: Large Adult   Pulse: 72   Temp: 97.2 °F (36.2 °C)   TempSrc: Tympanic   SpO2: 96%   Weight: 288 lb 9.6 oz (130.9 kg)   Height: 6' 1\" (1.854 m)      Body mass index is 38.08 kg/m². No Known Allergies  Prior to Visit Medications    Medication Sig Taking?  Authorizing Provider   Tirzepatide [facility]) 2.5 MG/0.5ML SOPN SC injection Inject 0.5 mLs into the skin once a week Yes RILEY Bass CNP   glimepiride (AMARYL) 2 MG tablet TAKE 1 TABLET EVERY MORNING BEFORE BREAKFAST Yes RILEY Bass CNP   colesevelam (WELCHOL) 625 MG tablet Take 1 tablet by mouth 2 times daily (with meals) Yes RILEY Bass CNP   simvastatin (ZOCOR) 20 MG tablet Take 1 tablet by mouth nightly Yes RILEY Bass CNP   amLODIPine (NORVASC) 10 MG tablet Take 1 tablet by mouth daily Yes RILEY Bass CNP   atenolol (TENORMIN) 100 MG tablet TAKE 1 TABLET DAILY  Patient taking differently: Take 100 mg by mouth daily TAKE 1 TABLET DAILY Yes RILEY Lamar CNP   metFORMIN (GLUCOPHAGE) 1000 MG tablet Take 1 tab po bid with meals Yes RILEY Lamar CNP   aspirin 81 MG EC tablet Take 81 mg by mouth daily Yes Historical Provider, MD   Omega-3 Fatty Acids (FISH OIL) 1200 MG CAPS Take 1,200 mg by mouth daily Yes Historical Provider, MD   simvastatin (ZOCOR) 20 MG tablet TAKE 1 TABLET EVERY EVENING  RILEY Lamar CNP   GILDARDO MICROLET LANCETS MISC 1 applicator by Does not apply route 2 times daily DX: DM E11.9  Abdulkadir Jennings MD       [facility] (Including outside providers/suppliers regularly involved in providing care):   Patient Care Team:  RILEY Lamar CNP as PCP - General (Family Nurse Practitioner)  RILEY Lamar CNP as PCP - Empaneled Provider     Reviewed and updated this visit:  Tobacco  Allergies  Meds  Problems  Med Hx  Surg Hx  Soc Hx  Fam Hx               RILEY Lamar CNP

## 2023-04-24 DIAGNOSIS — I10 ESSENTIAL HYPERTENSION: ICD-10-CM

## 2023-04-24 RX ORDER — AMLODIPINE BESYLATE 10 MG/1
10 TABLET ORAL DAILY
Qty: 90 TABLET | Refills: 1 | Status: SHIPPED | OUTPATIENT
Start: 2023-04-24

## 2023-04-27 DIAGNOSIS — I10 ESSENTIAL HYPERTENSION: ICD-10-CM

## 2023-04-27 RX ORDER — HYDROCHLOROTHIAZIDE 25 MG/1
25 TABLET ORAL EVERY MORNING
Qty: 90 TABLET | Refills: 1 | Status: SHIPPED | OUTPATIENT
Start: 2023-04-27

## 2023-05-26 ENCOUNTER — OFFICE VISIT (OUTPATIENT)
Dept: PRIMARY CARE CLINIC | Age: 73
End: 2023-05-26
Payer: MEDICARE

## 2023-05-26 VITALS
TEMPERATURE: 97.3 F | SYSTOLIC BLOOD PRESSURE: 131 MMHG | DIASTOLIC BLOOD PRESSURE: 59 MMHG | HEART RATE: 42 BPM | OXYGEN SATURATION: 100 %

## 2023-05-26 DIAGNOSIS — L03.115 CELLULITIS OF RIGHT LEG: Primary | ICD-10-CM

## 2023-05-26 DIAGNOSIS — R00.1 BRADYCARDIA: ICD-10-CM

## 2023-05-26 PROCEDURE — 99213 OFFICE O/P EST LOW 20 MIN: CPT

## 2023-05-26 PROCEDURE — 3078F DIAST BP <80 MM HG: CPT

## 2023-05-26 PROCEDURE — 3075F SYST BP GE 130 - 139MM HG: CPT

## 2023-05-26 PROCEDURE — 1123F ACP DISCUSS/DSCN MKR DOCD: CPT

## 2023-05-26 RX ORDER — DOXYCYCLINE HYCLATE 100 MG
100 TABLET ORAL 2 TIMES DAILY
Qty: 14 TABLET | Refills: 0 | Status: SHIPPED | OUTPATIENT
Start: 2023-05-26 | End: 2023-06-02

## 2023-05-26 ASSESSMENT — ENCOUNTER SYMPTOMS
EYE REDNESS: 0
SHORTNESS OF BREATH: 0
STRIDOR: 0
EYE DISCHARGE: 0
WHEEZING: 0
PHOTOPHOBIA: 0
APNEA: 0
DIARRHEA: 0
EYE ITCHING: 0
SORE THROAT: 0
VOMITING: 0
COUGH: 0
CHEST TIGHTNESS: 0
RHINORRHEA: 0
COLOR CHANGE: 0
EYE PAIN: 0
NAIL CHANGES: 0
CHOKING: 0

## 2023-05-26 NOTE — PROGRESS NOTES
BEFORE BREAKFAST 90 tablet 3    colesevelam (WELCHOL) 625 MG tablet Take 1 tablet by mouth 2 times daily (with meals) 180 tablet 3    simvastatin (ZOCOR) 20 MG tablet Take 1 tablet by mouth nightly 90 tablet 3    atenolol (TENORMIN) 100 MG tablet TAKE 1 TABLET DAILY (Patient taking differently: Take 1 tablet by mouth daily TAKE 1 TABLET DAILY) 90 tablet 3    metFORMIN (GLUCOPHAGE) 1000 MG tablet Take 1 tab po bid with meals 180 tablet 3    aspirin 81 MG EC tablet Take 81 mg by mouth daily (Patient not taking: Reported on 5/26/2023)      Cell[employer] MICROLET LANCETS MISC 1 applicator by Does not apply route 2 times daily DX: DM E11.9 60 each 5    Omega-3 Fatty Acids (FISH OIL) 1200 MG CAPS Take 1,200 mg by mouth daily (Patient not taking: Reported on 5/26/2023)       No current facility-administered medications for this visit. No Known Allergies    Review of Systems:     Review of Systems   Constitutional:  Negative for fatigue and fever. HENT:  Negative for congestion, rhinorrhea and sore throat. Eyes:  Negative for photophobia, pain, discharge, redness, itching and visual disturbance. Respiratory:  Negative for apnea, cough, choking, chest tightness, shortness of breath, wheezing and stridor. Cardiovascular:  Positive for leg swelling. Negative for chest pain and palpitations. Gastrointestinal:  Negative for anorexia, diarrhea and vomiting. Musculoskeletal:  Negative for joint pain. Skin:  Negative for color change, nail changes, pallor, rash and wound. Right low leg scabbing       Physical Exam:      BP (!) 131/59   Temp 97.3 °F (36.3 °C) (Oral)   SpO2 100%     Physical Exam  Constitutional:       Appearance: Normal appearance. HENT:      Head: Normocephalic and atraumatic. Cardiovascular:      Rate and Rhythm: Regular rhythm. Bradycardia present. Pulses: Normal pulses. Heart sounds: Normal heart sounds.    Pulmonary:      Effort: Pulmonary effort is normal.      Breath

## 2023-06-06 ENCOUNTER — OFFICE VISIT (OUTPATIENT)
Dept: FAMILY MEDICINE CLINIC | Age: 73
End: 2023-06-06
Payer: MEDICARE

## 2023-06-06 VITALS
SYSTOLIC BLOOD PRESSURE: 126 MMHG | DIASTOLIC BLOOD PRESSURE: 78 MMHG | HEIGHT: 73 IN | OXYGEN SATURATION: 97 % | HEART RATE: 36 BPM | TEMPERATURE: 96.8 F | BODY MASS INDEX: 37.69 KG/M2 | WEIGHT: 284.4 LBS

## 2023-06-06 DIAGNOSIS — R29.898 HEAVY SENSATION OF LOWER EXTREMITY: ICD-10-CM

## 2023-06-06 DIAGNOSIS — E78.2 MIXED HYPERLIPIDEMIA: ICD-10-CM

## 2023-06-06 DIAGNOSIS — I87.2 VENOUS INSUFFICIENCY: ICD-10-CM

## 2023-06-06 DIAGNOSIS — E11.9 TYPE 2 DIABETES MELLITUS WITHOUT COMPLICATION, WITHOUT LONG-TERM CURRENT USE OF INSULIN (HCC): Primary | ICD-10-CM

## 2023-06-06 DIAGNOSIS — M79.89 LEG SWELLING: ICD-10-CM

## 2023-06-06 DIAGNOSIS — I10 ESSENTIAL HYPERTENSION: ICD-10-CM

## 2023-06-06 DIAGNOSIS — I87.2 VENOUS REFLUX: ICD-10-CM

## 2023-06-06 DIAGNOSIS — R00.1 BRADYCARDIA: ICD-10-CM

## 2023-06-06 DIAGNOSIS — R06.02 SOB (SHORTNESS OF BREATH) ON EXERTION: ICD-10-CM

## 2023-06-06 DIAGNOSIS — Z01.818 PRE-OP TESTING: Primary | ICD-10-CM

## 2023-06-06 LAB — HBA1C MFR BLD: 5.7 %

## 2023-06-06 PROCEDURE — 93000 ELECTROCARDIOGRAM COMPLETE: CPT | Performed by: NURSE PRACTITIONER

## 2023-06-06 PROCEDURE — 3074F SYST BP LT 130 MM HG: CPT | Performed by: NURSE PRACTITIONER

## 2023-06-06 PROCEDURE — 3044F HG A1C LEVEL LT 7.0%: CPT | Performed by: NURSE PRACTITIONER

## 2023-06-06 PROCEDURE — 99215 OFFICE O/P EST HI 40 MIN: CPT | Performed by: NURSE PRACTITIONER

## 2023-06-06 PROCEDURE — 83036 HEMOGLOBIN GLYCOSYLATED A1C: CPT | Performed by: NURSE PRACTITIONER

## 2023-06-06 PROCEDURE — 1123F ACP DISCUSS/DSCN MKR DOCD: CPT | Performed by: NURSE PRACTITIONER

## 2023-06-06 PROCEDURE — 3078F DIAST BP <80 MM HG: CPT | Performed by: NURSE PRACTITIONER

## 2023-06-06 ASSESSMENT — ENCOUNTER SYMPTOMS
SHORTNESS OF BREATH: 1
COLOR CHANGE: 1
CHEST TIGHTNESS: 0
NAUSEA: 0
COUGH: 0
WHEEZING: 0
TROUBLE SWALLOWING: 0
VOMITING: 0
BACK PAIN: 0
ABDOMINAL PAIN: 0

## 2023-06-06 NOTE — PROGRESS NOTES
Visit Information    Have you changed or started any medications since your last visit including any over-the-counter medicines, vitamins, or herbal medicines? no   Have you stopped taking any of your medications? Is so, why? -  no  Are you having any side effects from any of your medications? - no    Have you seen any other physician or provider since your last visit?  no   Have you had any other diagnostic tests since your last visit?  no   Have you been seen in the emergency room and/or had an admission in a hospital since we last saw you?  no   Have you had your routine dental cleaning in the past 6 months?  no     Do you have an active MyChart account? If no, what is the barrier?   Yes    Patient Care Team:  RILEY Mcgee CNP as PCP - General (Family Nurse Practitioner)  RILEY Mcgee CNP as PCP - Empaneled Provider    Medical History Review  Past Medical, Family, and Social History reviewed and  contribute to the patient presenting condition    Health Maintenance   Topic Date Due    Shingles vaccine (2 of 3) 12/15/2016    Diabetic foot exam  01/14/2022    Lipids  03/01/2023    DTaP/Tdap/Td vaccine (1 - Tdap) 06/26/2023 (Originally 3/15/1969)    COVID-19 Vaccine (1) 08/11/2025 (Originally 1950)    Diabetic retinal exam  07/29/2023    Colorectal Cancer Screen  10/17/2023    GFR test (Diabetes, CKD 3-4, OR last GFR 15-59)  01/30/2024    A1C test (Diabetic or Prediabetic)  02/01/2024    Diabetic Alb to Cr ratio (uACR) test  02/01/2024    Depression Screen  03/02/2024    Annual Wellness Visit (AWV)  03/02/2024    Flu vaccine  Completed    Pneumococcal 65+ years Vaccine  Completed    AAA screen  Completed    Hepatitis C screen  Completed    Hepatitis A vaccine  Aged Out    Hib vaccine  Aged Out    Meningococcal (ACWY) vaccine  Aged Out
Stress-induced perfusion abnormalities encumbering greater than or equal  to 10% myocardium or stress segmental scores indicating multiple vascular  territories with abnormalities 4. Stress-induced LV dilatation (TID ratio  greater than 1.19 for exercise and greater than 1.39 for regadenoson)     Intermediate risk (1% to 3% annual death or MI)     1. Mild/moderate resting LV dysfunction (LVEF 35% to 49%) not readily  explained by non coronary causes. 2. Resting perfusion abnormalities in 5%-9.9% of the myocardium in patients  without a history or prior evidence of MI     3. Stress-induced perfusion abnormality encumbering 5%-9.9% of the myocardium  or stress segmental scores indicating 1 vascular territory with abnormalities  but without LV dilation 4. Small wall motion abnormality involving 1-2  segments and only 1 coronary bed. Low Risk (Less than 1% annual death or MI)     1. Normal or small myocardial perfusion defect at rest or with stress  encumbering less than 5% of the myocardium. IMPRESSION:     Fixed moderate severity basal inferior wall with decreased myocardial  thickening consistent with infarct. Small mild reversible mid inferior wall perfusion defect consistent with  ischemia. LVEF 40%     Risk stratification: Intermediate risk. The findings were sent to the Radiology Results [address] at 3:52  pm on 10/19/2022 to be communicated to a licensed caregiver. Plan:      Return in about 6 months (around 12/6/2023) for return for diabetes management. Orders Placed This Encounter   Procedures    Lipid Panel     Standing Status:   Future     Standing Expiration Date:   6/6/2024     Order Specific Question:   Is Patient Fasting?/# of Hours     Answer:   8     Order Specific Question:   Has the patient fasted?      Answer:   Yes    CBC with Auto Differential     Standing Status:   Future     Standing Expiration Date:   6/6/2024    Comprehensive Metabolic Panel     Standing

## 2023-06-07 ENCOUNTER — TELEPHONE (OUTPATIENT)
Dept: FAMILY MEDICINE CLINIC | Age: 73
End: 2023-06-07

## 2023-06-07 ENCOUNTER — HOSPITAL ENCOUNTER (OUTPATIENT)
Age: 73
Setting detail: SPECIMEN
Discharge: HOME | End: 2023-06-07

## 2023-06-07 ENCOUNTER — HOSPITAL ENCOUNTER (OUTPATIENT)
Dept: VASCULAR LAB | Age: 73
Discharge: HOME | End: 2023-06-07
Payer: MEDICARE

## 2023-06-07 DIAGNOSIS — E78.2 MIXED HYPERLIPIDEMIA: ICD-10-CM

## 2023-06-07 DIAGNOSIS — R00.1 BRADYCARDIA: ICD-10-CM

## 2023-06-07 DIAGNOSIS — R29.898 HEAVY SENSATION OF LOWER EXTREMITY: ICD-10-CM

## 2023-06-07 DIAGNOSIS — M79.89 LEG SWELLING: ICD-10-CM

## 2023-06-07 DIAGNOSIS — R06.02 SOB (SHORTNESS OF BREATH) ON EXERTION: ICD-10-CM

## 2023-06-07 DIAGNOSIS — E11.9 TYPE 2 DIABETES MELLITUS WITHOUT COMPLICATION, WITHOUT LONG-TERM CURRENT USE OF INSULIN (HCC): ICD-10-CM

## 2023-06-07 DIAGNOSIS — I10 ESSENTIAL HYPERTENSION: ICD-10-CM

## 2023-06-07 LAB
ALBUMIN SERPL-MCNC: 4 G/DL (ref 3.5–5.2)
ALBUMIN/GLOB SERPL: 1.4 {RATIO} (ref 1–2.5)
ALP SERPL-CCNC: 58 U/L (ref 40–129)
ALT SERPL-CCNC: 17 U/L (ref 5–41)
ANION GAP SERPL CALCULATED.3IONS-SCNC: 13 MMOL/L (ref 9–17)
AST SERPL-CCNC: 20 U/L
BASOPHILS # BLD: 0.08 K/UL (ref 0–0.2)
BASOPHILS NFR BLD: 1 % (ref 0–2)
BILIRUB SERPL-MCNC: 1 MG/DL (ref 0.3–1.2)
BNP SERPL-MCNC: 792 PG/ML
BUN SERPL-MCNC: 19 MG/DL (ref 8–23)
CALCIUM SERPL-MCNC: 9.3 MG/DL (ref 8.6–10.4)
CHLORIDE SERPL-SCNC: 103 MMOL/L (ref 98–107)
CHOLEST SERPL-MCNC: 106 MG/DL
CHOLESTEROL/HDL RATIO: 3.9
CO2 SERPL-SCNC: 23 MMOL/L (ref 20–31)
CREAT SERPL-MCNC: 1.15 MG/DL (ref 0.7–1.2)
EOSINOPHIL # BLD: 0.09 K/UL (ref 0–0.44)
EOSINOPHILS RELATIVE PERCENT: 1 % (ref 1–4)
ERYTHROCYTE [DISTWIDTH] IN BLOOD BY AUTOMATED COUNT: 13.6 % (ref 11.8–14.4)
GFR SERPL CREATININE-BSD FRML MDRD: >60 ML/MIN/1.73M2
GLUCOSE SERPL-MCNC: 133 MG/DL (ref 70–99)
HCT VFR BLD AUTO: 38.6 % (ref 40.7–50.3)
HDLC SERPL-MCNC: 27 MG/DL
HGB BLD-MCNC: 12.9 G/DL (ref 13–17)
IMM GRANULOCYTES # BLD AUTO: 0.05 K/UL (ref 0–0.3)
IMM GRANULOCYTES NFR BLD: 1 %
LDLC SERPL CALC-MCNC: 51 MG/DL (ref 0–130)
LYMPHOCYTES # BLD: 24 % (ref 24–43)
LYMPHOCYTES NFR BLD: 1.82 K/UL (ref 1.1–3.7)
MCH RBC QN AUTO: 31.9 PG (ref 25.2–33.5)
MCHC RBC AUTO-ENTMCNC: 33.4 G/DL (ref 28.4–34.8)
MCV RBC AUTO: 95.5 FL (ref 82.6–102.9)
MONOCYTES NFR BLD: 0.51 K/UL (ref 0.1–1.2)
MONOCYTES NFR BLD: 7 % (ref 3–12)
NEUTROPHILS NFR BLD: 66 % (ref 36–65)
NEUTS SEG NFR BLD: 5.17 K/UL (ref 1.5–8.1)
NRBC AUTOMATED: 0 PER 100 WBC
PLATELET # BLD AUTO: 167 K/UL (ref 138–453)
PMV BLD AUTO: 13 FL (ref 8.1–13.5)
POTASSIUM SERPL-SCNC: 4.2 MMOL/L (ref 3.7–5.3)
PROT SERPL-MCNC: 6.9 G/DL (ref 6.4–8.3)
RBC # BLD AUTO: 4.04 M/UL (ref 4.21–5.77)
SODIUM SERPL-SCNC: 139 MMOL/L (ref 135–144)
TRIGL SERPL-MCNC: 142 MG/DL
TSH SERPL-MCNC: 1.84 UIU/ML (ref 0.3–5)
WBC OTHER # BLD: 7.7 K/UL (ref 3.5–11.3)

## 2023-06-07 PROCEDURE — 93970 EXTREMITY STUDY: CPT

## 2023-06-08 ENCOUNTER — TELEPHONE (OUTPATIENT)
Dept: FAMILY MEDICINE CLINIC | Age: 73
End: 2023-06-08

## 2023-06-08 RX ORDER — ATENOLOL 50 MG/1
50 TABLET ORAL DAILY
Qty: 30 TABLET | Refills: 3 | Status: SHIPPED | OUTPATIENT
Start: 2023-06-08

## 2023-06-08 NOTE — TELEPHONE ENCOUNTER
Patient contacted the office and is asking if provider can send over 50 mg of atenolol to [facility] in [place].  Please advise

## 2023-06-23 ENCOUNTER — INITIAL CONSULT (OUTPATIENT)
Dept: VASCULAR SURGERY | Age: 73
End: 2023-06-23
Payer: MEDICARE

## 2023-06-23 VITALS
OXYGEN SATURATION: 97 % | DIASTOLIC BLOOD PRESSURE: 58 MMHG | WEIGHT: 283 LBS | HEIGHT: 72 IN | BODY MASS INDEX: 38.33 KG/M2 | HEART RATE: 36 BPM | TEMPERATURE: 97.7 F | RESPIRATION RATE: 18 BRPM | SYSTOLIC BLOOD PRESSURE: 126 MMHG

## 2023-06-23 DIAGNOSIS — I87.393 VENOUS HYPERTENSION, CHRONIC, WITH COMPLICATION, BILATERAL: Primary | ICD-10-CM

## 2023-06-23 DIAGNOSIS — I89.0 LYMPHEDEMA: ICD-10-CM

## 2023-06-23 PROCEDURE — 1123F ACP DISCUSS/DSCN MKR DOCD: CPT | Performed by: SURGERY

## 2023-06-23 PROCEDURE — 3078F DIAST BP <80 MM HG: CPT | Performed by: SURGERY

## 2023-06-23 PROCEDURE — 3074F SYST BP LT 130 MM HG: CPT | Performed by: SURGERY

## 2023-06-23 PROCEDURE — 99203 OFFICE O/P NEW LOW 30 MIN: CPT | Performed by: SURGERY

## 2023-06-23 ASSESSMENT — ENCOUNTER SYMPTOMS
SHORTNESS OF BREATH: 1
VOICE CHANGE: 0
ABDOMINAL DISTENTION: 0
VOMITING: 0
ABDOMINAL PAIN: 0
TROUBLE SWALLOWING: 0
EYE PAIN: 0
COLOR CHANGE: 0
COUGH: 0
CHEST TIGHTNESS: 0

## 2023-06-23 NOTE — PROGRESS NOTES
[facility]  [address] [facility] [address] [place] [address]  Dept: [phone]     Patient: Parisa Ortega  : 1950  MRN: [id number]  DOS: 2023    Referring provider:  RILEY Jones*         HPI:  Parisa Ortega is a 68 y.o. male who comes to the office for the first time regarding his lower extremity edema. Recall that he has right greater than left lower extremity edema. The right lower extremity has been involved with an episode of cellulitis for which he was hospitalized and placed on IV antibiotics. He was hospitalized for approximate 2 to 3 days and then discharged. Cellulitis has resolved the swelling remains. He has some areas of abrasion on the lateral aspect from which serous drainage has been exuding. He does have hypertension and high cholesterol. He is not diabetic. He has had cardiac catheterization in the recent past showing no significant stenoses at this time. I do not know what his ejection fraction is but he does have episodes of significant bradycardia and he does have episodes of significant hypotension. I think he has either drug-induced congestive heart failure or congestive heart failure proper. He is on a host of antihypertensive medications including hydrochlorothiazide, atenolol, amlodipine as well as valsartan. Amlodipine and valsartan can cause lower extremity edema and of themselves. His atenolol dose is 50 mg daily. He does explain to me that at times his heart rate can get as low as in the 30s.   Past Medical History:   Diagnosis Date    Atherosclerosis of aortic bifurcation and common iliac arteries ([facility] [address] 2018    H/O cardiac catheterization     Hyperlipidemia     on med    Hypertension     on med    Kidney stone     Skin cancer     Type 2 diabetes mellitus ([facility] [address]      Family History   Problem Relation Age of Onset    Kidney Cancer Father

## 2023-07-05 ENCOUNTER — HOSPITAL ENCOUNTER (OUTPATIENT)
Dept: NON INVASIVE DIAGNOSTICS | Age: 73
Discharge: HOME | End: 2023-07-05
Payer: MEDICARE

## 2023-07-05 DIAGNOSIS — M79.89 LEG SWELLING: ICD-10-CM

## 2023-07-05 DIAGNOSIS — R00.1 BRADYCARDIA: ICD-10-CM

## 2023-07-05 DIAGNOSIS — R06.02 SOB (SHORTNESS OF BREATH) ON EXERTION: ICD-10-CM

## 2023-07-05 LAB
LV EF: 48 %
LVEF MODALITY: NORMAL

## 2023-07-05 PROCEDURE — 93306 TTE W/DOPPLER COMPLETE: CPT

## 2023-07-05 PROCEDURE — 93225 XTRNL ECG REC<48 HRS REC: CPT

## 2023-07-05 PROCEDURE — 93226 XTRNL ECG REC<48 HR SCAN A/R: CPT

## 2023-07-11 ENCOUNTER — TELEPHONE (OUTPATIENT)
Dept: FAMILY MEDICINE CLINIC | Age: 73
End: 2023-07-11

## 2023-07-11 NOTE — TELEPHONE ENCOUNTER
Patient had it done at [facility] AND [facility].

## 2023-07-11 NOTE — TELEPHONE ENCOUNTER
Patient called and was inquiring about his Holter monitor results. I was unable to see if anything was resulted. Please advise.

## 2023-07-11 NOTE — TELEPHONE ENCOUNTER
Report says complete, but I do not see impression. Was this done at [facility]? If not please locate facility.

## 2023-07-12 ENCOUNTER — PATIENT MESSAGE (OUTPATIENT)
Dept: FAMILY MEDICINE CLINIC | Age: 73
End: 2023-07-12

## 2023-07-12 RX ORDER — FUROSEMIDE 20 MG/1
20 TABLET ORAL DAILY
COMMUNITY
Start: 2023-06-16

## 2023-07-12 RX ORDER — VALSARTAN 320 MG/1
320 TABLET ORAL DAILY
COMMUNITY
Start: 2023-06-06

## 2023-07-12 RX ORDER — POTASSIUM CHLORIDE 750 MG/1
10 TABLET, FILM COATED, EXTENDED RELEASE ORAL DAILY
COMMUNITY
Start: 2023-06-16

## 2023-07-12 RX ORDER — ATENOLOL 25 MG/1
25 TABLET ORAL EVERY MORNING
COMMUNITY
Start: 2023-06-13

## 2023-07-12 NOTE — TELEPHONE ENCOUNTER
Medication list updated - Homero Cejajamie will take out the medications that were discontinued at his next office visit

## 2023-07-12 NOTE — TELEPHONE ENCOUNTER
From: Marisol Andrade  To: Linde Merlin  Sent: 7/12/2023 11:05 AM EDT  Subject: Heart doctor has changed my medicine for heart    valsatan 320mg  atenolol 25mg  potassium cl er 10 meq  furosemide 20mg  I am doing better now

## 2023-07-27 ENCOUNTER — TELEPHONE (OUTPATIENT)
Dept: INFECTIOUS DISEASES | Age: 73
End: 2023-07-27

## 2023-07-27 NOTE — TELEPHONE ENCOUNTER
Patient called today requesting appointment with complaint of another sore on calf that is 'blood red like time you saw him before\". Next available appointment is 8/16/23. Do you want him to start any antibiotics? He is also going to call his PCP to see if he can get in sooner with them. Pharmacy [facility] in [place], [place]. Please advise, thanks.

## 2023-07-27 NOTE — TELEPHONE ENCOUNTER
I saw this nena in February and he did not have an infection at all at that time  So no new antibiotic orders from me   He can see PCP or go to ED

## 2023-07-28 NOTE — TELEPHONE ENCOUNTER
Called patient. Left voicemail for patient to contact PCP or ED if area has worsened. Asked him to call us if he wanted to cancel appt on 8/16/23.   RLS

## 2023-08-01 ENCOUNTER — OFFICE VISIT (OUTPATIENT)
Dept: FAMILY MEDICINE CLINIC | Age: 73
End: 2023-08-01
Payer: MEDICARE

## 2023-08-01 VITALS
OXYGEN SATURATION: 98 % | HEIGHT: 72 IN | SYSTOLIC BLOOD PRESSURE: 114 MMHG | TEMPERATURE: 96.6 F | BODY MASS INDEX: 37.76 KG/M2 | WEIGHT: 278.8 LBS | DIASTOLIC BLOOD PRESSURE: 68 MMHG | HEART RATE: 59 BPM

## 2023-08-01 DIAGNOSIS — L60.9 NAIL PROBLEM: ICD-10-CM

## 2023-08-01 DIAGNOSIS — I83.019 VENOUS ULCER OF RIGHT LEG (HCC): Primary | ICD-10-CM

## 2023-08-01 DIAGNOSIS — L03.115 CELLULITIS OF RIGHT LOWER EXTREMITY: ICD-10-CM

## 2023-08-01 DIAGNOSIS — L97.919 VENOUS ULCER OF RIGHT LEG (HCC): Primary | ICD-10-CM

## 2023-08-01 DIAGNOSIS — E11.9 TYPE 2 DIABETES MELLITUS WITHOUT COMPLICATION, WITHOUT LONG-TERM CURRENT USE OF INSULIN (HCC): ICD-10-CM

## 2023-08-01 DIAGNOSIS — I87.303 VENOUS HYPERTENSION OF BOTH LOWER EXTREMITIES: ICD-10-CM

## 2023-08-01 PROCEDURE — 99214 OFFICE O/P EST MOD 30 MIN: CPT | Performed by: NURSE PRACTITIONER

## 2023-08-01 PROCEDURE — 1123F ACP DISCUSS/DSCN MKR DOCD: CPT | Performed by: NURSE PRACTITIONER

## 2023-08-01 PROCEDURE — 3074F SYST BP LT 130 MM HG: CPT | Performed by: NURSE PRACTITIONER

## 2023-08-01 PROCEDURE — 3044F HG A1C LEVEL LT 7.0%: CPT | Performed by: NURSE PRACTITIONER

## 2023-08-01 PROCEDURE — 3078F DIAST BP <80 MM HG: CPT | Performed by: NURSE PRACTITIONER

## 2023-08-01 RX ORDER — CEPHALEXIN 500 MG/1
500 CAPSULE ORAL 4 TIMES DAILY
Qty: 28 CAPSULE | Refills: 0 | Status: SHIPPED | OUTPATIENT
Start: 2023-08-01 | End: 2023-08-08

## 2023-08-01 ASSESSMENT — ENCOUNTER SYMPTOMS
ABDOMINAL PAIN: 0
COLOR CHANGE: 1
CONSTIPATION: 0
VOMITING: 0
DIARRHEA: 0
NAUSEA: 0
SHORTNESS OF BREATH: 0
CHEST TIGHTNESS: 0
COUGH: 0

## 2023-08-03 DIAGNOSIS — I10 ESSENTIAL HYPERTENSION: ICD-10-CM

## 2023-08-03 DIAGNOSIS — E11.9 TYPE 2 DIABETES MELLITUS WITHOUT COMPLICATION, WITHOUT LONG-TERM CURRENT USE OF INSULIN (HCC): ICD-10-CM

## 2023-08-04 RX ORDER — TIRZEPATIDE 5 MG/.5ML
INJECTION, SOLUTION SUBCUTANEOUS
Qty: 6 ML | Refills: 3 | OUTPATIENT
Start: 2023-08-04

## 2023-08-04 RX ORDER — AMLODIPINE BESYLATE 10 MG/1
10 TABLET ORAL DAILY
Qty: 90 TABLET | Refills: 3 | OUTPATIENT
Start: 2023-08-04

## 2023-08-04 RX ORDER — HYDROCHLOROTHIAZIDE 25 MG/1
TABLET ORAL
Qty: 90 TABLET | Refills: 3 | Status: SHIPPED | OUTPATIENT
Start: 2023-08-04

## 2023-08-16 ENCOUNTER — OFFICE VISIT (OUTPATIENT)
Dept: INFECTIOUS DISEASES | Age: 73
End: 2023-08-16

## 2023-08-16 VITALS
TEMPERATURE: 97.4 F | DIASTOLIC BLOOD PRESSURE: 82 MMHG | BODY MASS INDEX: 37.52 KG/M2 | HEIGHT: 72 IN | WEIGHT: 277 LBS | HEART RATE: 37 BPM | SYSTOLIC BLOOD PRESSURE: 126 MMHG

## 2023-08-16 DIAGNOSIS — L03.119 RECURRENT CELLULITIS OF LOWER EXTREMITY: Primary | ICD-10-CM

## 2023-08-16 ASSESSMENT — ENCOUNTER SYMPTOMS
GASTROINTESTINAL NEGATIVE: 1
RESPIRATORY NEGATIVE: 1

## 2023-08-16 NOTE — PROGRESS NOTES
[facility]  Office Progress Note  Today's Date and Time: 8/16/2023, 10:55 AM    Impression:     1. Recurrent cellulitis of lower extremity         Recommendations   The cellulitis has resolved after receiving antibiotic therapy prescribed at the urgent care  I did discuss with the patient that the lower extremity edema is the risk factor for him getting cellulitis and controlling this would go a long way in decreasing the frequency of his cellulitis infections  The patient expressed understanding and reported that he does have compression stockings which he did not wear today because he knew he was coming in for this appointment  The patient also verbalized that he has anthem [employer] shield and he was disappointed that at this point in time he may not be able to follow-up in our office due to his insurance possibly becoming out of network starting in October    I have ordered the following medications/ labs:  No orders of the defined types were placed in this encounter. No orders of the defined types were placed in this encounter. Chief complaint/reason for consultation:     Chief Complaint   Patient presents with    Cellulitis     Follow up         History of Present Illness:   Aj Santoro is a 68y.o.-year-old male who has a history of multiple medical problems including diabetes mellitus with associated neuropathy, peripheral arterial disease, hypertension, hyperlipidemia, venous stasis dermatitis and was hospitalized with right lower extremity cellulitis and the patient did have a right lateral leg wound which was felt to be the nidus of the infection. He has a history of Streptococcus pyogenes wound infection/cellulitis which was treated with intravenous antimicrobial therapy and subsequently oral cephalexin.      He tells me that he has had lower extremity edema and did subsequently get cellulitis which was treated and then more recently another bout of cellulitis

## 2023-09-21 DIAGNOSIS — E11.9 TYPE 2 DIABETES MELLITUS WITHOUT COMPLICATION, WITHOUT LONG-TERM CURRENT USE OF INSULIN (HCC): ICD-10-CM

## 2023-09-21 RX ORDER — TIRZEPATIDE 5 MG/.5ML
INJECTION, SOLUTION SUBCUTANEOUS
Qty: 6 ML | Refills: 3 | Status: SHIPPED | OUTPATIENT
Start: 2023-09-21

## 2023-09-29 ENCOUNTER — OFFICE VISIT (OUTPATIENT)
Dept: VASCULAR SURGERY | Age: 73
End: 2023-09-29
Payer: MEDICARE

## 2023-09-29 VITALS
OXYGEN SATURATION: 98 % | SYSTOLIC BLOOD PRESSURE: 138 MMHG | HEIGHT: 72 IN | WEIGHT: 275 LBS | HEART RATE: 72 BPM | TEMPERATURE: 97.2 F | RESPIRATION RATE: 18 BRPM | DIASTOLIC BLOOD PRESSURE: 82 MMHG | BODY MASS INDEX: 37.25 KG/M2

## 2023-09-29 DIAGNOSIS — I89.0 LYMPHEDEMA: Primary | ICD-10-CM

## 2023-09-29 PROCEDURE — 99213 OFFICE O/P EST LOW 20 MIN: CPT | Performed by: SURGERY

## 2023-09-29 PROCEDURE — 3079F DIAST BP 80-89 MM HG: CPT | Performed by: SURGERY

## 2023-09-29 PROCEDURE — 3075F SYST BP GE 130 - 139MM HG: CPT | Performed by: SURGERY

## 2023-09-29 PROCEDURE — 1123F ACP DISCUSS/DSCN MKR DOCD: CPT | Performed by: SURGERY

## 2023-11-13 DIAGNOSIS — Z12.11 SCREEN FOR COLON CANCER: ICD-10-CM

## 2023-11-13 DIAGNOSIS — E11.9 TYPE 2 DIABETES MELLITUS WITHOUT COMPLICATION, WITHOUT LONG-TERM CURRENT USE OF INSULIN (HCC): Primary | ICD-10-CM

## 2023-11-13 RX ORDER — TIRZEPATIDE 2.5 MG/.5ML
2.5 INJECTION, SOLUTION SUBCUTANEOUS WEEKLY
Qty: 6 ML | Refills: 0 | Status: SHIPPED | OUTPATIENT
Start: 2023-11-13

## 2023-12-01 LAB — NONINV COLON CA DNA+OCC BLD SCRN STL QL: NEGATIVE

## 2024-01-12 ENCOUNTER — TELEPHONE (OUTPATIENT)
Dept: FAMILY MEDICINE CLINIC | Age: 74
End: 2024-01-12

## 2024-01-12 NOTE — TELEPHONE ENCOUNTER
Pt contact office needing a new script for handcap placard. His recently . He is in the [facility].     Please advise.

## 2024-01-14 DIAGNOSIS — E11.9 TYPE 2 DIABETES MELLITUS WITHOUT COMPLICATION, WITHOUT LONG-TERM CURRENT USE OF INSULIN (HCC): ICD-10-CM

## 2024-01-14 RX ORDER — TIRZEPATIDE 2.5 MG/.5ML
INJECTION, SOLUTION SUBCUTANEOUS
Qty: 6 ML | Refills: 3 | Status: SHIPPED | OUTPATIENT
Start: 2024-01-14

## 2024-03-22 RX ORDER — SIMVASTATIN 20 MG
20 TABLET ORAL NIGHTLY
Qty: 90 TABLET | Refills: 3 | Status: SHIPPED | OUTPATIENT
Start: 2024-03-22

## 2024-06-04 ENCOUNTER — OFFICE VISIT (OUTPATIENT)
Dept: FAMILY MEDICINE CLINIC | Age: 74
End: 2024-06-04
Payer: MEDICARE

## 2024-06-04 VITALS
SYSTOLIC BLOOD PRESSURE: 124 MMHG | HEIGHT: 72 IN | BODY MASS INDEX: 36.84 KG/M2 | OXYGEN SATURATION: 96 % | WEIGHT: 272 LBS | HEART RATE: 72 BPM | DIASTOLIC BLOOD PRESSURE: 86 MMHG | TEMPERATURE: 97 F

## 2024-06-04 DIAGNOSIS — E11.9 TYPE 2 DIABETES MELLITUS WITHOUT COMPLICATION, WITHOUT LONG-TERM CURRENT USE OF INSULIN (HCC): ICD-10-CM

## 2024-06-04 DIAGNOSIS — L03.115 CELLULITIS OF RIGHT LOWER EXTREMITY: ICD-10-CM

## 2024-06-04 DIAGNOSIS — Z00.00 MEDICARE ANNUAL WELLNESS VISIT, SUBSEQUENT: Primary | ICD-10-CM

## 2024-06-04 LAB — HBA1C MFR BLD: 5.6 %

## 2024-06-04 PROCEDURE — 99213 OFFICE O/P EST LOW 20 MIN: CPT | Performed by: NURSE PRACTITIONER

## 2024-06-04 PROCEDURE — 3074F SYST BP LT 130 MM HG: CPT | Performed by: NURSE PRACTITIONER

## 2024-06-04 PROCEDURE — 3079F DIAST BP 80-89 MM HG: CPT | Performed by: NURSE PRACTITIONER

## 2024-06-04 PROCEDURE — 3044F HG A1C LEVEL LT 7.0%: CPT | Performed by: NURSE PRACTITIONER

## 2024-06-04 PROCEDURE — 83036 HEMOGLOBIN GLYCOSYLATED A1C: CPT | Performed by: NURSE PRACTITIONER

## 2024-06-04 PROCEDURE — G0439 PPPS, SUBSEQ VISIT: HCPCS | Performed by: NURSE PRACTITIONER

## 2024-06-04 PROCEDURE — 1123F ACP DISCUSS/DSCN MKR DOCD: CPT | Performed by: NURSE PRACTITIONER

## 2024-06-04 RX ORDER — CEPHALEXIN 500 MG/1
500 CAPSULE ORAL 4 TIMES DAILY
Qty: 28 CAPSULE | Refills: 0 | Status: SHIPPED | OUTPATIENT
Start: 2024-06-04 | End: 2024-06-11

## 2024-06-04 SDOH — ECONOMIC STABILITY: FOOD INSECURITY: WITHIN THE PAST 12 MONTHS, YOU WORRIED THAT YOUR FOOD WOULD RUN OUT BEFORE YOU GOT MONEY TO BUY MORE.: NEVER TRUE

## 2024-06-04 SDOH — ECONOMIC STABILITY: FOOD INSECURITY: WITHIN THE PAST 12 MONTHS, THE FOOD YOU BOUGHT JUST DIDN'T LAST AND YOU DIDN'T HAVE MONEY TO GET MORE.: NEVER TRUE

## 2024-06-04 SDOH — ECONOMIC STABILITY: INCOME INSECURITY: HOW HARD IS IT FOR YOU TO PAY FOR THE VERY BASICS LIKE FOOD, HOUSING, MEDICAL CARE, AND HEATING?: NOT HARD AT ALL

## 2024-06-04 ASSESSMENT — PATIENT HEALTH QUESTIONNAIRE - PHQ9
SUM OF ALL RESPONSES TO PHQ QUESTIONS 1-9: 0
SUM OF ALL RESPONSES TO PHQ QUESTIONS 1-9: 0
SUM OF ALL RESPONSES TO PHQ9 QUESTIONS 1 & 2: 0
1. LITTLE INTEREST OR PLEASURE IN DOING THINGS: NOT AT ALL
SUM OF ALL RESPONSES TO PHQ QUESTIONS 1-9: 0
SUM OF ALL RESPONSES TO PHQ QUESTIONS 1-9: 0
2. FEELING DOWN, DEPRESSED OR HOPELESS: NOT AT ALL

## 2024-06-04 NOTE — PROGRESS NOTES
Visit Information    Have you changed or started any medications since your last visit including any over-the-counter medicines, vitamins, or herbal medicines? no   Have you stopped taking any of your medications? Is so, why? -  no  Are you having any side effects from any of your medications? - no    Have you seen any other physician or provider since your last visit?  no   Have you had any other diagnostic tests since your last visit?  no   Have you been seen in the emergency room and/or had an admission in a hospital since we last saw you?  no   Have you had your routine dental cleaning in the past 6 months?  no     Do you have an active MyChart account? If no, what is the barrier?  Yes    Patient Care Team:  Vira Means APRN - CNP as PCP - General (Family Nurse Practitioner)  Vira Means APRN - CNP as PCP - Empaneled Provider    Medical History Review  Past Medical, Family, and Social History reviewed and  contribute to the patient presenting condition    Health Maintenance   Topic Date Due    DTaP/Tdap/Td vaccine (1 - Tdap) Never done    Respiratory Syncytial Virus (RSV) Pregnant or age 60 yrs+ (1 - 1-dose 60+ series) Never done    Shingles vaccine (2 of 3) 12/15/2016    Diabetic foot exam  01/14/2022    Annual Wellness Visit (Medicare Advantage)  01/01/2024    Diabetic Alb to Cr ratio (uACR) test  02/01/2024    Depression Screen  03/02/2024    Lipids  06/07/2024    GFR test (Diabetes, CKD 3-4, OR last GFR 15-59)  06/07/2024    COVID-19 Vaccine (1) 08/11/2025 (Originally 1950)    Flu vaccine (Season Ended) 08/01/2024    Diabetic retinal exam  11/02/2024    A1C test (Diabetic or Prediabetic)  11/23/2024    Colorectal Cancer Screen  11/21/2026    Pneumococcal 65+ years Vaccine  Completed    AAA screen  Completed    Hepatitis C screen  Completed    Hepatitis A vaccine  Aged Out    Hepatitis B vaccine  Aged Out    Hib vaccine  Aged Out    Polio vaccine  Aged Out    Meningococcal (ACWY) vaccine  
values have been reviewed and are found in Flowsheets. The following problems were reviewed today and where indicated follow up appointments were made and/or referrals ordered.    Positive Risk Factor Screenings with Interventions:                Activity, Diet, and Weight:  On average, how many days per week do you engage in moderate to strenuous exercise (like a brisk walk)?: 0 days  On average, how many minutes do you engage in exercise at this level?: 0 min    Do you eat balanced/healthy meals regularly?: (!) No    Body mass index is 36.89 kg/m². (!) Abnormal      Inactivity Interventions:  Patient declined any further interventions or treatment  Obesity Interventions:  Patient declines any further evaluation or treatment            Dentist Screen:  Have you seen the dentist within the past year?: (!) No  Hearing Screen:  Do you or your family notice any trouble with your hearing that hasn't been managed with hearing aids?: (!) Yes  Vision Screen:  Do you have difficulty driving, watching TV, or doing any of your daily activities because of your eyesight?: (!) Yes  Have you had an eye exam within the past year?: Yes  No results found.  Safety:  Do you have non-slip mats or non-slip surfaces or shower bars or grab bars in your shower or bathtub?: (!) No                   Objective   Vitals:    06/04/24 1020   BP: 124/86   Site: Right Upper Arm   Position: Sitting   Cuff Size: Large Adult   Pulse: 72   Temp: 97 °F (36.1 °C)   TempSrc: Tympanic   SpO2: 96%   Weight: 123.4 kg (272 lb)   Height: 1.829 m (6')      Body mass index is 36.89 kg/m².        General Appearance: alert and oriented to person, place and time, well-developed and well-nourished, in no acute distress  Pulmonary/Chest: clear to auscultation bilaterally- no wheezes, rales or rhonchi, normal air movement, no respiratory distress  Cardiovascular: normal rate, normal S1 and S2, no gallops, intact distal pulses, and no carotid bruits  Extremities: no

## 2024-06-07 ENCOUNTER — TELEPHONE (OUTPATIENT)
Dept: FAMILY MEDICINE CLINIC | Age: 74
End: 2024-06-07

## 2024-06-07 NOTE — TELEPHONE ENCOUNTER
Vira has patient on an antibiotic presently. Encourage him to continue that until completed. If the rash is getting larger, weeping, worsening in size or appearance I recommend he get it seen. Let him know the UC is open over the weekend if needed

## 2024-06-07 NOTE — TELEPHONE ENCOUNTER
Pt contacted office stating after appt on 6/4 the following day another spot of cellulitis popped up, states its 2x a big as the part seen on 6/4, asking if he needs to do anything different then the treatment he is already doing from 6/4 or if he's ok to continue with that plan.

## 2024-07-15 DIAGNOSIS — I10 ESSENTIAL HYPERTENSION: ICD-10-CM

## 2024-07-16 RX ORDER — HYDROCHLOROTHIAZIDE 25 MG/1
TABLET ORAL
Qty: 90 TABLET | Refills: 3 | Status: SHIPPED | OUTPATIENT
Start: 2024-07-16

## 2024-07-31 ENCOUNTER — OFFICE VISIT (OUTPATIENT)
Dept: FAMILY MEDICINE CLINIC | Age: 74
End: 2024-07-31
Payer: MEDICARE

## 2024-07-31 VITALS
HEART RATE: 60 BPM | BODY MASS INDEX: 37.19 KG/M2 | WEIGHT: 274.6 LBS | HEIGHT: 72 IN | OXYGEN SATURATION: 97 % | SYSTOLIC BLOOD PRESSURE: 122 MMHG | TEMPERATURE: 97 F | DIASTOLIC BLOOD PRESSURE: 76 MMHG

## 2024-07-31 DIAGNOSIS — E78.2 MIXED HYPERLIPIDEMIA: ICD-10-CM

## 2024-07-31 DIAGNOSIS — I10 ESSENTIAL HYPERTENSION: ICD-10-CM

## 2024-07-31 DIAGNOSIS — Z12.5 SPECIAL SCREENING FOR MALIGNANT NEOPLASM OF PROSTATE: ICD-10-CM

## 2024-07-31 DIAGNOSIS — E11.9 TYPE 2 DIABETES MELLITUS WITHOUT COMPLICATION, WITHOUT LONG-TERM CURRENT USE OF INSULIN (HCC): Primary | ICD-10-CM

## 2024-07-31 PROCEDURE — 3078F DIAST BP <80 MM HG: CPT | Performed by: NURSE PRACTITIONER

## 2024-07-31 PROCEDURE — 1123F ACP DISCUSS/DSCN MKR DOCD: CPT | Performed by: NURSE PRACTITIONER

## 2024-07-31 PROCEDURE — 3074F SYST BP LT 130 MM HG: CPT | Performed by: NURSE PRACTITIONER

## 2024-07-31 PROCEDURE — 3044F HG A1C LEVEL LT 7.0%: CPT | Performed by: NURSE PRACTITIONER

## 2024-07-31 PROCEDURE — 99213 OFFICE O/P EST LOW 20 MIN: CPT | Performed by: NURSE PRACTITIONER

## 2024-07-31 ASSESSMENT — ENCOUNTER SYMPTOMS
VOMITING: 0
COUGH: 0
SHORTNESS OF BREATH: 0
CHEST TIGHTNESS: 0
NAUSEA: 0
ABDOMINAL PAIN: 0

## 2024-07-31 NOTE — PROGRESS NOTES
Visit Information    Have you changed or started any medications since your last visit including any over-the-counter medicines, vitamins, or herbal medicines? no   Have you stopped taking any of your medications? Is so, why? -  no  Are you having any side effects from any of your medications? - no    Have you seen any other physician or provider since your last visit?  no   Have you had any other diagnostic tests since your last visit?  no   Have you been seen in the emergency room and/or had an admission in a hospital since we last saw you?  no   Have you had your routine dental cleaning in the past 6 months?  no     Do you have an active MyChart account? If no, what is the barrier?  Yes    Patient Care Team:  Vira Means APRN - CNP as PCP - General (Family Nurse Practitioner)  Viar Means APRN - CNP as PCP - Empaneled Provider    Medical History Review  Past Medical, Family, and Social History reviewed and  contribute to the patient presenting condition    Health Maintenance   Topic Date Due    DTaP/Tdap/Td vaccine (1 - Tdap) Never done    Respiratory Syncytial Virus (RSV) Pregnant or age 60 yrs+ (1 - 1-dose 60+ series) Never done    Shingles vaccine (2 of 3) 12/15/2016    Diabetic foot exam  01/14/2022    Diabetic Alb to Cr ratio (uACR) test  02/01/2024    Lipids  06/07/2024    GFR test (Diabetes, CKD 3-4, OR last GFR 15-59)  06/07/2024    COVID-19 Vaccine (1) 08/11/2025 (Originally 1950)    Flu vaccine (1) 08/01/2024    Diabetic retinal exam  11/02/2024    A1C test (Diabetic or Prediabetic)  06/04/2025    Depression Screen  06/04/2025    Colorectal Cancer Screen  11/21/2026    Annual Wellness Visit (Medicare Advantage)  Completed    Pneumococcal 65+ years Vaccine  Completed    AAA screen  Completed    Hepatitis C screen  Completed    Hepatitis A vaccine  Aged Out    Hepatitis B vaccine  Aged Out    Hib vaccine  Aged Out    Polio vaccine  Aged Out    Meningococcal (ACWY) vaccine  Aged Out

## 2024-07-31 NOTE — PROGRESS NOTES
[facility]  [address] [place] [address]  [phone]      John Gunderson is a 74 y.o. male who presents today for his  medicalconditions/complaints as noted below.  John Gunderson is c/o of Discuss Medications (Glucocil, ordered from [facility] after watching dr on [place], supposed to help pancrease, notices BS will drop after taking.   Has been taking for a couple weeks)  .    HPI:    HPI  Patient here today to discuss new vitamin supplement he started taking for diabetic control.  He found this specialist on "[employer]ok that was recommending this supplement to help improve pancreatic function.  States he has been taking it for a few weeks at this time and does notice occasionally he will have hypoglycemia with blood sugars in the 60s.  States he is monitoring his blood sugars closely but wonders if he needs to stop anything else.      Please note that this chart was generated using voice recognition Dragon dictation software.  Although every effort was made to ensure the accuracy of this automated transcription, some errors in transcription may have occurred.    Past Medical History:   Diagnosis Date    Atherosclerosis of aortic bifurcation and common iliac arteries (HCC) 12/13/2018    H/O cardiac catheterization 2003    Hyperlipidemia     on med    Hypertension     on med    Kidney stone     Skin cancer     Type 2 diabetes mellitus (HCC)       Past Surgical History:   Procedure Laterality Date    CARDIAC CATHETERIZATION  several    COLONOSCOPY      INGUINAL HERNIA REPAIR Right     LITHOTRIPSY  2023    PENIS SURGERY      SKIN CANCER EXCISION      TIBIA FRACTURE SURGERY Left     UPPER GASTROINTESTINAL ENDOSCOPY       Family History   Problem Relation Age of Onset    Kidney Cancer Father     COPD Mother     Other Mother         aneurysm    No Known Problems Sister      Social History     Tobacco Use    Smoking status: Former     Current packs/day: 0.00     Average packs/day: 1

## 2024-08-02 ENCOUNTER — HOSPITAL ENCOUNTER (OUTPATIENT)
Age: 74
Setting detail: SPECIMEN
Discharge: HOME | End: 2024-08-02

## 2024-08-02 DIAGNOSIS — E11.9 TYPE 2 DIABETES MELLITUS WITHOUT COMPLICATION, WITHOUT LONG-TERM CURRENT USE OF INSULIN (HCC): ICD-10-CM

## 2024-08-02 DIAGNOSIS — I10 ESSENTIAL HYPERTENSION: ICD-10-CM

## 2024-08-02 DIAGNOSIS — Z12.5 SPECIAL SCREENING FOR MALIGNANT NEOPLASM OF PROSTATE: ICD-10-CM

## 2024-08-02 DIAGNOSIS — E78.2 MIXED HYPERLIPIDEMIA: ICD-10-CM

## 2024-08-02 LAB
ALBUMIN SERPL-MCNC: 4.4 G/DL (ref 3.5–5.2)
ALBUMIN/GLOB SERPL: 2 {RATIO} (ref 1–2.5)
ALP SERPL-CCNC: 51 U/L (ref 40–129)
ALT SERPL-CCNC: 21 U/L (ref 10–50)
ANION GAP SERPL CALCULATED.3IONS-SCNC: 12 MMOL/L (ref 9–16)
AST SERPL-CCNC: 28 U/L (ref 10–50)
BASOPHILS # BLD: 0.06 K/UL (ref 0–0.2)
BASOPHILS NFR BLD: 1 % (ref 0–2)
BILIRUB SERPL-MCNC: 1.3 MG/DL (ref 0–1.2)
BUN SERPL-MCNC: 23 MG/DL (ref 8–23)
CALCIUM SERPL-MCNC: 9.5 MG/DL (ref 8.6–10.4)
CHLORIDE SERPL-SCNC: 106 MMOL/L (ref 98–107)
CHOLEST SERPL-MCNC: 112 MG/DL (ref 0–199)
CHOLESTEROL/HDL RATIO: 4
CO2 SERPL-SCNC: 24 MMOL/L (ref 20–31)
CREAT SERPL-MCNC: 1.4 MG/DL (ref 0.7–1.2)
CREAT UR-MCNC: 133 MG/DL (ref 39–259)
EOSINOPHIL # BLD: 0.12 K/UL (ref 0–0.44)
EOSINOPHILS RELATIVE PERCENT: 2 % (ref 1–4)
ERYTHROCYTE [DISTWIDTH] IN BLOOD BY AUTOMATED COUNT: 13.8 % (ref 11.8–14.4)
GFR, ESTIMATED: 55 ML/MIN/1.73M2
GLUCOSE SERPL-MCNC: 120 MG/DL (ref 74–99)
HCT VFR BLD AUTO: 44.6 % (ref 40.7–50.3)
HDLC SERPL-MCNC: 27 MG/DL
HGB BLD-MCNC: 14.8 G/DL (ref 13–17)
IMM GRANULOCYTES # BLD AUTO: 0.03 K/UL (ref 0–0.3)
IMM GRANULOCYTES NFR BLD: 0 %
LDLC SERPL CALC-MCNC: 44 MG/DL (ref 0–100)
LYMPHOCYTES NFR BLD: 1.7 K/UL (ref 1.1–3.7)
LYMPHOCYTES RELATIVE PERCENT: 22 % (ref 24–43)
MCH RBC QN AUTO: 32.7 PG (ref 25.2–33.5)
MCHC RBC AUTO-ENTMCNC: 33.2 G/DL (ref 28.4–34.8)
MCV RBC AUTO: 98.5 FL (ref 82.6–102.9)
MICROALBUMIN UR-MCNC: 25 MG/L (ref 0–20)
MICROALBUMIN/CREAT UR-RTO: 19 MCG/MG CREAT (ref 0–17)
MONOCYTES NFR BLD: 0.46 K/UL (ref 0.1–1.2)
MONOCYTES NFR BLD: 6 % (ref 3–12)
NEUTROPHILS NFR BLD: 69 % (ref 36–65)
NEUTS SEG NFR BLD: 5.25 K/UL (ref 1.5–8.1)
NRBC BLD-RTO: 0 PER 100 WBC
PLATELET # BLD AUTO: 180 K/UL (ref 138–453)
PMV BLD AUTO: 11.8 FL (ref 8.1–13.5)
POTASSIUM SERPL-SCNC: 4.6 MMOL/L (ref 3.7–5.3)
PROT SERPL-MCNC: 7.2 G/DL (ref 6.6–8.7)
PSA SERPL-MCNC: 1.8 NG/ML (ref 0–4)
RBC # BLD AUTO: 4.53 M/UL (ref 4.21–5.77)
SODIUM SERPL-SCNC: 142 MMOL/L (ref 136–145)
TRIGL SERPL-MCNC: 208 MG/DL
VLDLC SERPL CALC-MCNC: 42 MG/DL
WBC OTHER # BLD: 7.6 K/UL (ref 3.5–11.3)

## 2024-08-05 DIAGNOSIS — R17 ELEVATED BILIRUBIN: ICD-10-CM

## 2024-08-05 DIAGNOSIS — E11.9 TYPE 2 DIABETES MELLITUS WITHOUT COMPLICATION, WITHOUT LONG-TERM CURRENT USE OF INSULIN (HCC): Primary | ICD-10-CM

## 2024-08-08 ENCOUNTER — HOSPITAL ENCOUNTER (OUTPATIENT)
Age: 74
Setting detail: SPECIMEN
Discharge: HOME | End: 2024-08-08

## 2024-08-08 DIAGNOSIS — R17 ELEVATED BILIRUBIN: ICD-10-CM

## 2024-08-08 DIAGNOSIS — E11.9 TYPE 2 DIABETES MELLITUS WITHOUT COMPLICATION, WITHOUT LONG-TERM CURRENT USE OF INSULIN (HCC): ICD-10-CM

## 2024-08-08 LAB
ANION GAP SERPL CALCULATED.3IONS-SCNC: 10 MMOL/L (ref 9–16)
BILIRUB DIRECT SERPL-MCNC: 0.5 MG/DL (ref 0–0.2)
BUN SERPL-MCNC: 29 MG/DL (ref 8–23)
CALCIUM SERPL-MCNC: 9.3 MG/DL (ref 8.6–10.4)
CHLORIDE SERPL-SCNC: 103 MMOL/L (ref 98–107)
CO2 SERPL-SCNC: 25 MMOL/L (ref 20–31)
CREAT SERPL-MCNC: 1.4 MG/DL (ref 0.7–1.2)
GFR, ESTIMATED: 51 ML/MIN/1.73M2
GLUCOSE SERPL-MCNC: 137 MG/DL (ref 74–99)
POTASSIUM SERPL-SCNC: 4.3 MMOL/L (ref 3.7–5.3)
SODIUM SERPL-SCNC: 138 MMOL/L (ref 136–145)

## 2024-08-09 DIAGNOSIS — E11.9 TYPE 2 DIABETES MELLITUS WITHOUT COMPLICATION, WITHOUT LONG-TERM CURRENT USE OF INSULIN (HCC): Primary | ICD-10-CM

## 2024-08-21 ENCOUNTER — OFFICE VISIT (OUTPATIENT)
Dept: FAMILY MEDICINE CLINIC | Age: 74
End: 2024-08-21
Payer: MEDICARE

## 2024-08-21 VITALS
HEART RATE: 71 BPM | TEMPERATURE: 96.9 F | WEIGHT: 268.2 LBS | OXYGEN SATURATION: 98 % | SYSTOLIC BLOOD PRESSURE: 132 MMHG | DIASTOLIC BLOOD PRESSURE: 64 MMHG | HEIGHT: 72 IN | BODY MASS INDEX: 36.33 KG/M2

## 2024-08-21 DIAGNOSIS — I83.019 VENOUS ULCER OF RIGHT LEG (HCC): Primary | ICD-10-CM

## 2024-08-21 DIAGNOSIS — L97.919 VENOUS ULCER OF RIGHT LEG (HCC): Primary | ICD-10-CM

## 2024-08-21 DIAGNOSIS — L60.9 NAIL PROBLEM: ICD-10-CM

## 2024-08-21 DIAGNOSIS — L03.115 CELLULITIS OF RIGHT LOWER EXTREMITY: ICD-10-CM

## 2024-08-21 DIAGNOSIS — E11.42 TYPE 2 DIABETES MELLITUS WITH DIABETIC POLYNEUROPATHY, WITHOUT LONG-TERM CURRENT USE OF INSULIN (HCC): ICD-10-CM

## 2024-08-21 PROCEDURE — 1123F ACP DISCUSS/DSCN MKR DOCD: CPT | Performed by: NURSE PRACTITIONER

## 2024-08-21 PROCEDURE — 3078F DIAST BP <80 MM HG: CPT | Performed by: NURSE PRACTITIONER

## 2024-08-21 PROCEDURE — 3044F HG A1C LEVEL LT 7.0%: CPT | Performed by: NURSE PRACTITIONER

## 2024-08-21 PROCEDURE — 99214 OFFICE O/P EST MOD 30 MIN: CPT | Performed by: NURSE PRACTITIONER

## 2024-08-21 PROCEDURE — 3075F SYST BP GE 130 - 139MM HG: CPT | Performed by: NURSE PRACTITIONER

## 2024-08-21 RX ORDER — DOXYCYCLINE HYCLATE 100 MG
100 TABLET ORAL 2 TIMES DAILY
Qty: 20 TABLET | Refills: 0 | Status: SHIPPED | OUTPATIENT
Start: 2024-08-21 | End: 2024-08-31

## 2024-08-21 ASSESSMENT — ENCOUNTER SYMPTOMS
COUGH: 0
SHORTNESS OF BREATH: 0
CHEST TIGHTNESS: 0
VOMITING: 0
ABDOMINAL PAIN: 0
NAUSEA: 0
COLOR CHANGE: 1

## 2024-08-21 NOTE — PROGRESS NOTES
[facility]  [address] [place] [address]  [phone]      John Gunderson is a 74 y.o. male who presents today for his  medicalconditions/complaints as noted below.  John Gunderson is c/o of Lesion(s) (Left lower leg, scratched leg a few days ago, now having redness,swelling, using cream from last ov, skin looks shiny)  .    HPI:    HPI  Patient here today for evaluation of recurrent cellulitis to right lower leg.  States he has scratched his leg and developed a blister which then popped and now has redness swelling and warmth to his right lower leg.  Denies fever, chills.  States he has had this several times in the past.    Past Medical History:   Diagnosis Date    Atherosclerosis of aortic bifurcation and common iliac arteries (HCC) 2018    H/O cardiac catheterization 2003    Hyperlipidemia     on med    Hypertension     on med    Kidney stone     Skin cancer     Type 2 diabetes mellitus (HCC)       Past Surgical History:   Procedure Laterality Date    CARDIAC CATHETERIZATION  several    COLONOSCOPY      INGUINAL HERNIA REPAIR Right     LITHOTRIPSY      PENIS SURGERY      SKIN CANCER EXCISION      TIBIA FRACTURE SURGERY Left     UPPER GASTROINTESTINAL ENDOSCOPY       Family History   Problem Relation Age of Onset    Kidney Cancer Father     COPD Mother     Other Mother         aneurysm    No Known Problems Sister      Social History     Tobacco Use    Smoking status: Former     Current packs/day: 0.00     Average packs/day: 1 pack/day for 20.0 years (20.0 ttl pk-yrs)     Types: Cigarettes     Start date: 1960     Quit date: 1980     Years since quittin.6    Smokeless tobacco: Never   Substance Use Topics    Alcohol use: No      Current Outpatient Medications   Medication Sig Dispense Refill    doxycycline hyclate (VIBRA-TABS) 100 MG tablet Take 1 tablet by mouth 2 times daily for 10 days 20 tablet 0    mupirocin (BACTROBAN) 2 % ointment Apply

## 2024-08-21 NOTE — PROGRESS NOTES
Visit Information    Have you changed or started any medications since your last visit including any over-the-counter medicines, vitamins, or herbal medicines? no   Have you stopped taking any of your medications? Is so, why? -  no  Are you having any side effects from any of your medications? - no    Have you seen any other physician or provider since your last visit?  no   Have you had any other diagnostic tests since your last visit?  no   Have you been seen in the emergency room and/or had an admission in a hospital since we last saw you?  no   Have you had your routine dental cleaning in the past 6 months?  no     Do you have an active MyChart account? If no, what is the barrier?  Yes    Patient Care Team:  Vira Means APRN - CNP as PCP - General (Family Nurse Practitioner)  Vira Means APRN - CNP as PCP - Empaneled Provider    Medical History Review  Past Medical, Family, and Social History reviewed and  contribute to the patient presenting condition    Health Maintenance   Topic Date Due    DTaP/Tdap/Td vaccine (1 - Tdap) Never done    Respiratory Syncytial Virus (RSV) Pregnant or age 60 yrs+ (1 - 1-dose 60+ series) Never done    Shingles vaccine (2 of 3) 12/15/2016    Diabetic foot exam  01/14/2022    COVID-19 Vaccine (1 - 2023-24 season) Never done    Flu vaccine (1) 08/01/2024    Diabetic retinal exam  11/02/2024    A1C test (Diabetic or Prediabetic)  06/04/2025    Depression Screen  06/04/2025    Diabetic Alb to Cr ratio (uACR) test  08/02/2025    Lipids  08/02/2025    GFR test (Diabetes, CKD 3-4, OR last GFR 15-59)  08/08/2025    Colorectal Cancer Screen  11/21/2026    Annual Wellness Visit (Medicare Advantage)  Completed    Pneumococcal 65+ years Vaccine  Completed    AAA screen  Completed    Hepatitis C screen  Completed    Hepatitis A vaccine  Aged Out    Hepatitis B vaccine  Aged Out    Hib vaccine  Aged Out    Polio vaccine  Aged Out    Meningococcal (ACWY) vaccine  Aged Out

## 2024-09-03 ENCOUNTER — HOSPITAL ENCOUNTER (OUTPATIENT)
Age: 74
Setting detail: SPECIMEN
Discharge: HOME | End: 2024-09-03

## 2024-09-03 DIAGNOSIS — E11.9 TYPE 2 DIABETES MELLITUS WITHOUT COMPLICATION, WITHOUT LONG-TERM CURRENT USE OF INSULIN (HCC): ICD-10-CM

## 2024-09-03 LAB
BUN SERPL-MCNC: 33 MG/DL (ref 8–23)
CREAT SERPL-MCNC: 1.4 MG/DL (ref 0.7–1.2)
GFR, ESTIMATED: 51 ML/MIN/1.73M2

## 2024-09-04 ENCOUNTER — TELEPHONE (OUTPATIENT)
Dept: FAMILY MEDICINE CLINIC | Age: 74
End: 2024-09-04

## 2024-09-04 DIAGNOSIS — E11.42 TYPE 2 DIABETES MELLITUS WITH DIABETIC POLYNEUROPATHY, WITHOUT LONG-TERM CURRENT USE OF INSULIN (HCC): Primary | ICD-10-CM

## 2024-09-04 NOTE — TELEPHONE ENCOUNTER
Patient called in questioning if he needs to stop pop and coffee due to kidney #'s on labs or any certain restrictions ?    Please advise.

## 2024-09-05 NOTE — TELEPHONE ENCOUNTER
Lean cuts of meat, such as loin or round, Poultry without the skin,Fish,Beans,Vegetables,Fruits,Low-fat or fat-free milk, yogurt, and cheese are all fine. 1-2 cups coffee daily is fine and reduce salt and avoid soda

## 2024-09-11 DIAGNOSIS — L97.919 VENOUS ULCER OF RIGHT LEG (HCC): ICD-10-CM

## 2024-09-11 DIAGNOSIS — I83.019 VENOUS ULCER OF RIGHT LEG (HCC): ICD-10-CM

## 2024-09-11 DIAGNOSIS — L03.115 CELLULITIS OF RIGHT LOWER EXTREMITY: ICD-10-CM

## 2024-09-11 RX ORDER — DOXYCYCLINE HYCLATE 100 MG
100 TABLET ORAL 2 TIMES DAILY
Qty: 20 TABLET | Refills: 0 | Status: SHIPPED | OUTPATIENT
Start: 2024-09-11 | End: 2024-09-21

## 2024-09-11 RX ORDER — MUPIROCIN 20 MG/G
OINTMENT TOPICAL
Qty: 30 G | Refills: 0 | Status: SHIPPED | OUTPATIENT
Start: 2024-09-11 | End: 2024-09-18

## 2024-11-06 ENCOUNTER — OFFICE VISIT (OUTPATIENT)
Dept: FAMILY MEDICINE CLINIC | Age: 74
End: 2024-11-06
Payer: MEDICARE

## 2024-11-06 VITALS
DIASTOLIC BLOOD PRESSURE: 88 MMHG | BODY MASS INDEX: 37.03 KG/M2 | TEMPERATURE: 96.9 F | OXYGEN SATURATION: 98 % | HEART RATE: 70 BPM | HEIGHT: 72 IN | SYSTOLIC BLOOD PRESSURE: 134 MMHG | WEIGHT: 273.4 LBS

## 2024-11-06 DIAGNOSIS — E11.9 TYPE 2 DIABETES MELLITUS WITHOUT COMPLICATION, WITHOUT LONG-TERM CURRENT USE OF INSULIN (HCC): Primary | ICD-10-CM

## 2024-11-06 PROCEDURE — 3044F HG A1C LEVEL LT 7.0%: CPT | Performed by: NURSE PRACTITIONER

## 2024-11-06 PROCEDURE — 3075F SYST BP GE 130 - 139MM HG: CPT | Performed by: NURSE PRACTITIONER

## 2024-11-06 PROCEDURE — 99213 OFFICE O/P EST LOW 20 MIN: CPT | Performed by: NURSE PRACTITIONER

## 2024-11-06 PROCEDURE — 3079F DIAST BP 80-89 MM HG: CPT | Performed by: NURSE PRACTITIONER

## 2024-11-06 PROCEDURE — 1123F ACP DISCUSS/DSCN MKR DOCD: CPT | Performed by: NURSE PRACTITIONER

## 2024-11-06 RX ORDER — HYDROCHLOROTHIAZIDE 12.5 MG/1
12.5 CAPSULE ORAL EVERY MORNING
Qty: 90 CAPSULE | Refills: 1 | Status: SHIPPED | OUTPATIENT
Start: 2024-11-06

## 2024-11-06 ASSESSMENT — ENCOUNTER SYMPTOMS
SHORTNESS OF BREATH: 0
NAUSEA: 0
ABDOMINAL PAIN: 0
CHEST TIGHTNESS: 0
COUGH: 0
VOMITING: 0

## 2024-11-06 NOTE — PROGRESS NOTES
Visit Information    Have you changed or started any medications since your last visit including any over-the-counter medicines, vitamins, or herbal medicines? no   Have you stopped taking any of your medications? Is so, why? -  no  Are you having any side effects from any of your medications? - no    Have you seen any other physician or provider since your last visit?  no   Have you had any other diagnostic tests since your last visit?  no   Have you been seen in the emergency room and/or had an admission in a hospital since we last saw you?  no   Have you had your routine dental cleaning in the past 6 months?  no     Do you have an active MyChart account? If no, what is the barrier?  Yes    Patient Care Team:  Vira Means APRN - CNP as PCP - General (Family Nurse Practitioner)  Vira Means APRN - CNP as PCP - Empaneled Provider    Medical History Review  Past Medical, Family, and Social History reviewed and  contribute to the patient presenting condition    Health Maintenance   Topic Date Due    DTaP/Tdap/Td vaccine (1 - Tdap) Never done    Respiratory Syncytial Virus (RSV) Pregnant or age 60 yrs+ (1 - 1-dose 60+ series) Never done    Shingles vaccine (2 of 3) 12/15/2016    Flu vaccine (1) 08/01/2024    COVID-19 Vaccine (1 - 2023-24 season) Never done    Diabetic retinal exam  11/02/2024    A1C test (Diabetic or Prediabetic)  06/04/2025    Depression Screen  06/04/2025    Lipids  08/02/2025    Diabetic foot exam  08/21/2025    GFR test (Diabetes, CKD 3-4, OR last GFR 15-59)  09/03/2025    Diabetic Alb to Cr ratio (uACR) test  10/19/2025    Colorectal Cancer Screen  11/21/2026    Annual Wellness Visit (Medicare Advantage)  Completed    Pneumococcal 65+ years Vaccine  Completed    AAA screen  Completed    Hepatitis C screen  Completed    Hepatitis A vaccine  Aged Out    Hepatitis B vaccine  Aged Out    Hib vaccine  Aged Out    Polio vaccine  Aged Out    Meningococcal (ACWY) vaccine  Aged Out 
with new readings    Patient given educational materials - see patient instructions.  Discussed use,benefit, and side effects of prescribed medications.  All patient questions answered.Pt voiced understanding. Reviewed health maintenance.  Instructed to continue currentmedications, diet and exercise.    Electronically signed by RILEY Lucia CNP, CNP on 11/6/2024 at 3:33 PM

## 2024-11-14 ENCOUNTER — TELEPHONE (OUTPATIENT)
Dept: FAMILY MEDICINE CLINIC | Age: 74
End: 2024-11-14

## 2024-11-14 RX ORDER — LIDOCAINE 50 MG/G
1 PATCH TOPICAL DAILY
Qty: 10 PATCH | Refills: 0 | Status: SHIPPED | OUTPATIENT
Start: 2024-11-14 | End: 2024-11-24

## 2024-11-14 NOTE — TELEPHONE ENCOUNTER
Patients wife was in the office and requesting lidocaine patch 5% for the patient to be sent to the pharmacy

## 2024-12-02 RX ORDER — COLESEVELAM 180 1/1
625 TABLET ORAL 2 TIMES DAILY WITH MEALS
Qty: 180 TABLET | Refills: 3 | OUTPATIENT
Start: 2024-12-02

## 2024-12-02 RX ORDER — COLESEVELAM 180 1/1
625 TABLET ORAL 2 TIMES DAILY WITH MEALS
Qty: 180 TABLET | Refills: 3 | Status: SHIPPED | OUTPATIENT
Start: 2024-12-02

## 2024-12-16 ENCOUNTER — OFFICE VISIT (OUTPATIENT)
Dept: PRIMARY CARE CLINIC | Age: 74
End: 2024-12-16
Payer: MEDICARE

## 2024-12-16 VITALS
HEART RATE: 66 BPM | TEMPERATURE: 97.8 F | SYSTOLIC BLOOD PRESSURE: 111 MMHG | DIASTOLIC BLOOD PRESSURE: 60 MMHG | OXYGEN SATURATION: 97 %

## 2024-12-16 DIAGNOSIS — L03.115 CELLULITIS OF RIGHT LEG: Primary | ICD-10-CM

## 2024-12-16 PROCEDURE — 99213 OFFICE O/P EST LOW 20 MIN: CPT

## 2024-12-16 PROCEDURE — 3074F SYST BP LT 130 MM HG: CPT

## 2024-12-16 PROCEDURE — 1123F ACP DISCUSS/DSCN MKR DOCD: CPT

## 2024-12-16 PROCEDURE — 3078F DIAST BP <80 MM HG: CPT

## 2024-12-16 RX ORDER — FUROSEMIDE 20 MG/1
TABLET ORAL
COMMUNITY
Start: 2024-11-13

## 2024-12-16 RX ORDER — CARVEDILOL 12.5 MG/1
TABLET ORAL
COMMUNITY
Start: 2024-11-12

## 2024-12-16 RX ORDER — MUPIROCIN 20 MG/G
OINTMENT TOPICAL
Qty: 30 G | Refills: 0 | Status: SHIPPED | OUTPATIENT
Start: 2024-12-16 | End: 2024-12-23

## 2024-12-16 RX ORDER — DOXYCYCLINE HYCLATE 100 MG
100 TABLET ORAL 2 TIMES DAILY
Qty: 14 TABLET | Refills: 0 | Status: SHIPPED | OUTPATIENT
Start: 2024-12-16 | End: 2024-12-23

## 2024-12-16 RX ORDER — SPIRONOLACTONE 25 MG/1
25 TABLET ORAL DAILY
COMMUNITY
Start: 2024-11-12

## 2024-12-16 ASSESSMENT — ENCOUNTER SYMPTOMS
EYES NEGATIVE: 1
COUGH: 0
STRIDOR: 0
NAIL CHANGES: 0
SINUS PRESSURE: 0
BLOOD IN STOOL: 0
FACIAL SWELLING: 0
APNEA: 0
TROUBLE SWALLOWING: 0
EYE REDNESS: 0
VOMITING: 0
ABDOMINAL DISTENTION: 0
VOICE CHANGE: 0
CHEST TIGHTNESS: 0
RHINORRHEA: 0
EYE DISCHARGE: 0
COLOR CHANGE: 0
GASTROINTESTINAL NEGATIVE: 1
SORE THROAT: 0
SINUS PAIN: 0
DIARRHEA: 0
CONSTIPATION: 0
CHOKING: 0
EYE PAIN: 0
ANAL BLEEDING: 0
ABDOMINAL PAIN: 0
RECTAL PAIN: 0
RESPIRATORY NEGATIVE: 1
SHORTNESS OF BREATH: 0
NAUSEA: 0
BACK PAIN: 0
WHEEZING: 0
PHOTOPHOBIA: 0
EYE ITCHING: 0

## 2024-12-16 NOTE — PROGRESS NOTES
[facility]  [address]  [place] [address]    [facility]  [address]  [facility] [address]  Dept: [phone]     John Gunderson is a 74 y.o. male Established patient, who presents to the walk-in clinic today with conditions/complaints as noted below:    Chief Complaint   Patient presents with    Cellulitis     Right lower leg          HPI:     Skin Problem  This is a recurrent problem. Episode onset: couple of days ago. The problem has been gradually worsening since onset. Location: right low leg. The rash is characterized by redness and pain. It is unknown if there was an exposure to a precipitant. Pertinent negatives include no anorexia, congestion, cough, diarrhea, eye pain, facial edema, fatigue, fever, joint pain, nail changes, rhinorrhea, shortness of breath, sore throat or vomiting. Past treatments include nothing.     Pt has had this recurrent skin infection since 2023. He was seen by infectious disease and believes this is likely recurrent due to lower leg edema. He was advised to use compression stockings regularly. He reports he only uses it sometimes.     Past Medical History:   Diagnosis Date    Atherosclerosis of aortic bifurcation and common iliac arteries (HCC) 12/13/2018    H/O cardiac catheterization 2003    Hyperlipidemia     on med    Hypertension     on med    Kidney stone     Skin cancer     Type 2 diabetes mellitus (HCC)        Current Outpatient Medications   Medication Sig Dispense Refill    carvedilol (COREG) 12.5 MG tablet       furosemide (LASIX) 20 MG tablet       spironolactone (ALDACTONE) 25 MG tablet Take 1 tablet by mouth daily      doxycycline hyclate (VIBRA-TABS) 100 MG tablet Take 1 tablet by mouth 2 times daily for 7 days 14 tablet 0    mupirocin (BACTROBAN) 2 % ointment Apply topically 3 times daily.

## 2024-12-28 ENCOUNTER — TELEPHONE (OUTPATIENT)
Dept: PRIMARY CARE CLINIC | Age: 74
End: 2024-12-28

## 2024-12-28 DIAGNOSIS — L03.115 CELLULITIS OF RIGHT LEG: Primary | ICD-10-CM

## 2024-12-28 RX ORDER — DOXYCYCLINE HYCLATE 100 MG
100 TABLET ORAL 2 TIMES DAILY
Qty: 14 TABLET | Refills: 0 | Status: SHIPPED | OUTPATIENT
Start: 2024-12-28 | End: 2025-01-04

## 2024-12-28 NOTE — TELEPHONE ENCOUNTER
Patient stopped into office stating that the cellulitis is not completely resolved, but he has been finished with the antibiotic for 4 days. Patient would like to know if you can send more of the antibiotic in, or what you recommend?

## 2024-12-28 NOTE — TELEPHONE ENCOUNTER
Patient does not have an appointment with infectious disease, but has been wearing the compression stockings. Patient is not sure if he needs a referral to infectious disease since it has been over a year since he saw them last. Can you please place a referral for Infectious Disease at . Salvador Reid MD.

## 2025-01-09 ENCOUNTER — OFFICE VISIT (OUTPATIENT)
Dept: INFECTIOUS DISEASES | Age: 75
End: 2025-01-09
Payer: MEDICARE

## 2025-01-09 VITALS
DIASTOLIC BLOOD PRESSURE: 63 MMHG | SYSTOLIC BLOOD PRESSURE: 114 MMHG | BODY MASS INDEX: 37.11 KG/M2 | HEART RATE: 64 BPM | HEIGHT: 72 IN | WEIGHT: 274 LBS | RESPIRATION RATE: 18 BRPM | OXYGEN SATURATION: 99 %

## 2025-01-09 DIAGNOSIS — L03.119 RECURRENT CELLULITIS OF LOWER EXTREMITY: Primary | ICD-10-CM

## 2025-01-09 DIAGNOSIS — I87.2 VENOUS STASIS DERMATITIS OF RIGHT LOWER EXTREMITY: ICD-10-CM

## 2025-01-09 PROCEDURE — 1123F ACP DISCUSS/DSCN MKR DOCD: CPT | Performed by: INTERNAL MEDICINE

## 2025-01-09 PROCEDURE — 3078F DIAST BP <80 MM HG: CPT | Performed by: INTERNAL MEDICINE

## 2025-01-09 PROCEDURE — 3074F SYST BP LT 130 MM HG: CPT | Performed by: INTERNAL MEDICINE

## 2025-01-09 PROCEDURE — 1159F MED LIST DOCD IN RCRD: CPT | Performed by: INTERNAL MEDICINE

## 2025-01-09 PROCEDURE — 99213 OFFICE O/P EST LOW 20 MIN: CPT | Performed by: INTERNAL MEDICINE

## 2025-01-09 PROCEDURE — 1160F RVW MEDS BY RX/DR IN RCRD: CPT | Performed by: INTERNAL MEDICINE

## 2025-01-09 RX ORDER — MUPIROCIN 20 MG/G
OINTMENT TOPICAL
COMMUNITY
Start: 2024-12-16

## 2025-01-09 RX ORDER — GLIMEPIRIDE 2 MG/1
2 TABLET ORAL
COMMUNITY

## 2025-01-09 ASSESSMENT — ENCOUNTER SYMPTOMS
RESPIRATORY NEGATIVE: 1
GASTROINTESTINAL NEGATIVE: 1

## 2025-01-09 NOTE — PROGRESS NOTES
06:20 AM    EOSPCT 2 08/02/2024 08:14 AM    EOSPCT 1 06/07/2023 07:54 AM    EOSPCT 1 01/28/2023 06:20 AM       BMP:  Lab Results   Component Value Date/Time     08/08/2024 08:49 AM     08/02/2024 08:14 AM    K 4.3 08/08/2024 08:49 AM    K 4.6 08/02/2024 08:14 AM     08/08/2024 08:49 AM     08/02/2024 08:14 AM    CO2 25 08/08/2024 08:49 AM    CO2 24 08/02/2024 08:14 AM    BUN 33 09/03/2024 08:10 AM    BUN 29 08/08/2024 08:49 AM    CREATININE 1.4 09/03/2024 08:10 AM    CREATININE 1.4 08/08/2024 08:49 AM    MG 1.7 01/30/2023 05:28 AM    MG 1.8 01/29/2023 05:36 AM       Hepatic Function Panel:   Lab Results   Component Value Date/Time    BILIDIR 0.5 08/08/2024 08:49 AM    BILIDIR 0.26 03/16/2019 08:20 PM    IBILI 0.79 03/16/2019 08:20 PM    IBILI 0.90 02/03/2015 10:51 AM    BILITOT 1.3 08/02/2024 08:14 AM    BILITOT 1.0 06/07/2023 07:54 AM    ALKPHOS 51 08/02/2024 08:14 AM    ALKPHOS 58 06/07/2023 07:54 AM    ALT 21 08/02/2024 08:14 AM    ALT 17 06/07/2023 07:54 AM    AST 28 08/02/2024 08:14 AM    AST 20 06/07/2023 07:54 AM       Lab Results   Component Value Date/Time    .3 01/27/2023 01:56 PM    CRP 1.7 12/07/2018 12:00 PM     Lab Results   Component Value Date/Time    SEDRATE 22 01/27/2023 01:56 PM    SEDRATE 4 12/07/2018 12:00 PM         Imaging Studies:   No new imaging    Cultures:   Culture and Sensitivities:  No results for input(s): \"SPECDESC\", \"SPECIAL\", \"CULTURE\", \"STATUS\", \"ORG\", \"CDIFFTOXPCR\", \"CAMPYLOBPCR\", \"SALMONELLAPC\", \"SHIGAPCR\", \"SHIGELLAPCR\" in the last 72 hours.      Thank you for allowing us to participate in the care of this patient. Please call with questions.    Electronically signed by Salvador Jacobson MD on 1/9/2025 at 9:18 AM      Salvador Jacobson MD  [employer] messaging: [phone]    The patient (or guardian, if applicable) and other individuals in attendance with the patient were advised that Artificial Intelligence will be utilized during

## 2025-01-28 RX ORDER — SIMVASTATIN 20 MG
20 TABLET ORAL NIGHTLY
Qty: 90 TABLET | Refills: 3 | Status: SHIPPED | OUTPATIENT
Start: 2025-01-28

## 2025-01-28 NOTE — TELEPHONE ENCOUNTER
John Gunderson is calling to request a refill on the following medication(s):    Last Visit Date (If Applicable):  11/6/2024    Next Visit Date:    1/29/2025    Medication Request:  Requested Prescriptions     Pending Prescriptions Disp Refills    simvastatin (ZOCOR) 20 MG tablet [Pharmacy Med Name: Simvastatin 20 MG Oral Tablet] 90 tablet 3     Sig: TAKE 1 TABLET BY MOUTH NIGHTLY

## 2025-01-29 ENCOUNTER — OFFICE VISIT (OUTPATIENT)
Dept: FAMILY MEDICINE CLINIC | Age: 75
End: 2025-01-29
Payer: MEDICARE

## 2025-01-29 VITALS
SYSTOLIC BLOOD PRESSURE: 126 MMHG | TEMPERATURE: 96.9 F | WEIGHT: 277 LBS | BODY MASS INDEX: 37.52 KG/M2 | HEIGHT: 72 IN | DIASTOLIC BLOOD PRESSURE: 66 MMHG | OXYGEN SATURATION: 96 % | HEART RATE: 72 BPM

## 2025-01-29 DIAGNOSIS — E11.9 TYPE 2 DIABETES MELLITUS WITHOUT COMPLICATION, WITHOUT LONG-TERM CURRENT USE OF INSULIN (HCC): Primary | ICD-10-CM

## 2025-01-29 DIAGNOSIS — J34.89 RHINORRHEA: ICD-10-CM

## 2025-01-29 LAB — HBA1C MFR BLD: 5.6 %

## 2025-01-29 PROCEDURE — 1123F ACP DISCUSS/DSCN MKR DOCD: CPT | Performed by: NURSE PRACTITIONER

## 2025-01-29 PROCEDURE — 1160F RVW MEDS BY RX/DR IN RCRD: CPT | Performed by: NURSE PRACTITIONER

## 2025-01-29 PROCEDURE — 3044F HG A1C LEVEL LT 7.0%: CPT | Performed by: NURSE PRACTITIONER

## 2025-01-29 PROCEDURE — 83036 HEMOGLOBIN GLYCOSYLATED A1C: CPT | Performed by: NURSE PRACTITIONER

## 2025-01-29 PROCEDURE — 1159F MED LIST DOCD IN RCRD: CPT | Performed by: NURSE PRACTITIONER

## 2025-01-29 PROCEDURE — 99213 OFFICE O/P EST LOW 20 MIN: CPT | Performed by: NURSE PRACTITIONER

## 2025-01-29 PROCEDURE — 3078F DIAST BP <80 MM HG: CPT | Performed by: NURSE PRACTITIONER

## 2025-01-29 PROCEDURE — 3074F SYST BP LT 130 MM HG: CPT | Performed by: NURSE PRACTITIONER

## 2025-01-29 RX ORDER — HYDROCHLOROTHIAZIDE 12.5 MG/1
12.5 CAPSULE ORAL EVERY MORNING
Qty: 90 CAPSULE | Refills: 1 | Status: SHIPPED | OUTPATIENT
Start: 2025-01-29

## 2025-01-29 SDOH — ECONOMIC STABILITY: FOOD INSECURITY: WITHIN THE PAST 12 MONTHS, THE FOOD YOU BOUGHT JUST DIDN'T LAST AND YOU DIDN'T HAVE MONEY TO GET MORE.: NEVER TRUE

## 2025-01-29 SDOH — ECONOMIC STABILITY: FOOD INSECURITY: WITHIN THE PAST 12 MONTHS, YOU WORRIED THAT YOUR FOOD WOULD RUN OUT BEFORE YOU GOT MONEY TO BUY MORE.: NEVER TRUE

## 2025-01-29 ASSESSMENT — ENCOUNTER SYMPTOMS
SINUS PAIN: 0
ABDOMINAL PAIN: 0
VOMITING: 0
NAUSEA: 0
COUGH: 0
RHINORRHEA: 1
CHEST TIGHTNESS: 0
SHORTNESS OF BREATH: 0
SINUS PRESSURE: 0

## 2025-01-29 ASSESSMENT — PATIENT HEALTH QUESTIONNAIRE - PHQ9
2. FEELING DOWN, DEPRESSED OR HOPELESS: NOT AT ALL
SUM OF ALL RESPONSES TO PHQ QUESTIONS 1-9: 0
SUM OF ALL RESPONSES TO PHQ9 QUESTIONS 1 & 2: 0
1. LITTLE INTEREST OR PLEASURE IN DOING THINGS: NOT AT ALL
SUM OF ALL RESPONSES TO PHQ QUESTIONS 1-9: 0

## 2025-01-29 NOTE — PROGRESS NOTES
Visit Information    Have you changed or started any medications since your last visit including any over-the-counter medicines, vitamins, or herbal medicines? no   Have you stopped taking any of your medications? Is so, why? -  no  Are you having any side effects from any of your medications? - no    Have you seen any other physician or provider since your last visit?  no   Have you had any other diagnostic tests since your last visit?  no   Have you been seen in the emergency room and/or had an admission in a hospital since we last saw you?  no   Have you had your routine dental cleaning in the past 6 months?  no     Do you have an active MyChart account? If no, what is the barrier?  Yes    Patient Care Team:  Vira Means APRN - CNP as PCP - General (Family Nurse Practitioner)  Vira Means APRN - CNP as PCP - Empaneled Provider    Medical History Review  Past Medical, Family, and Social History reviewed and  contribute to the patient presenting condition    Health Maintenance   Topic Date Due    DTaP/Tdap/Td vaccine (1 - Tdap) Never done    Shingles vaccine (2 of 3) 12/15/2016    COVID-19 Vaccine (1 - 2023-24 season) Never done    Diabetic retinal exam  11/02/2024    Annual Wellness Visit (Medicare Advantage)  01/01/2025    Flu vaccine (1) 11/06/2025 (Originally 8/1/2024)    Respiratory Syncytial Virus (RSV) Pregnant or age 60 yrs+ (1 - 1-dose 75+ series) 03/15/2025    A1C test (Diabetic or Prediabetic)  06/04/2025    Depression Screen  06/04/2025    Lipids  08/02/2025    Diabetic foot exam  08/21/2025    GFR test (Diabetes, CKD 3-4, OR last GFR 15-59)  09/03/2025    Diabetic Alb to Cr ratio (uACR) test  10/19/2025    Colorectal Cancer Screen  11/21/2026    Pneumococcal 65+ years Vaccine  Completed    AAA screen  Completed    Hepatitis C screen  Completed    Hepatitis A vaccine  Aged Out    Hepatitis B vaccine  Aged Out    Hib vaccine  Aged Out    Polio vaccine  Aged Out    Meningococcal (ACWY)

## 2025-01-29 NOTE — PROGRESS NOTES
[facility]  [address] [place] [address]  [phone]      John Gunderson is a 74 y.o. male who presents today for his  medicalconditions/complaints as noted below.  John Gunderson is c/o of nasal drainage (Has been constant when he is active, going on since start of weather), Diabetes, and Skin Problem (Has seen inf disease regarding cellulitis)  .    HPI:    74-year-old male presents to with a chief complaints of running nose. He states the nasal drainage started since the wether has changed. He states his nose drains while eating and with increase activity. Drainage is clear. He denies fever and recent illness or hx of environmental allergies. . He denies cough and SOB. He has a history of diabetes, he states he checks his blood sugar at home and they run in the 70's. Denies any readings lower than 70's since last visit. He states he eats a balance meal and consume protein. He is following up with infective disease for history of cellulitis. He states he understands that he's suppose to keep his legs moisturized and wear to compression stockings. He states he does not wear them every day due to them being hard to take on and off. But he is trying hard with this.     Diabetes  Pertinent negatives for hypoglycemia include no dizziness or headaches. Pertinent negatives for diabetes include no chest pain, no fatigue, no polydipsia, no polyphagia, no polyuria and no weakness.   Skin Problem  Associated symptoms include rhinorrhea (clear). Pertinent negatives include no cough, fatigue, fever, shortness of breath or vomiting.       Past Medical History:   Diagnosis Date    Atherosclerosis of aortic bifurcation and common iliac arteries (HCC) 12/13/2018    Diastolic dysfunction     H/O cardiac catheterization 2003    Hyperlipidemia     on med    Hypertension     on med    Kidney stone     Skin cancer     Type 2 diabetes mellitus (HCC)       Past Surgical History:   Procedure

## 2025-02-06 ENCOUNTER — TELEPHONE (OUTPATIENT)
Dept: FAMILY MEDICINE CLINIC | Age: 75
End: 2025-02-06

## 2025-02-06 RX ORDER — LIDOCAINE 50 MG/G
1 PATCH TOPICAL DAILY
Qty: 30 PATCH | Refills: 0 | Status: SHIPPED | OUTPATIENT
Start: 2025-02-06 | End: 2025-03-08

## 2025-02-06 NOTE — TELEPHONE ENCOUNTER
Lv 1/29/2025      Pt [facility]       Pt requesting 4-5% 4x5 lidocaine patches for mid back pain and spasms.       States he has been buying OTC but insurance will cover if provider calls them in.       Please advise

## 2025-03-14 DIAGNOSIS — E11.9 TYPE 2 DIABETES MELLITUS WITHOUT COMPLICATION, WITHOUT LONG-TERM CURRENT USE OF INSULIN (HCC): ICD-10-CM

## 2025-03-14 RX ORDER — TIRZEPATIDE 2.5 MG/.5ML
INJECTION, SOLUTION SUBCUTANEOUS
Qty: 6 ML | Refills: 3 | Status: SHIPPED | OUTPATIENT
Start: 2025-03-14

## 2025-03-18 RX ORDER — GLIMEPIRIDE 2 MG/1
2 TABLET ORAL
Qty: 90 TABLET | Refills: 1 | Status: SHIPPED | OUTPATIENT
Start: 2025-03-18

## 2025-03-18 NOTE — TELEPHONE ENCOUNTER
John Gunderson is calling to request a refill on the following medication(s):    Medication Request:  Requested Prescriptions     Pending Prescriptions Disp Refills    glimepiride (AMARYL) 2 MG tablet 30 tablet      Sig: Take 1 tablet by mouth every morning (before breakfast)       Last Visit Date (If Applicable):  1/29/2025    Next Visit Date:    Visit date not found

## 2025-06-03 ENCOUNTER — OFFICE VISIT (OUTPATIENT)
Dept: FAMILY MEDICINE CLINIC | Age: 75
End: 2025-06-03
Payer: MEDICARE

## 2025-06-03 VITALS
HEART RATE: 65 BPM | WEIGHT: 274.6 LBS | OXYGEN SATURATION: 96 % | TEMPERATURE: 97 F | HEIGHT: 72 IN | SYSTOLIC BLOOD PRESSURE: 124 MMHG | DIASTOLIC BLOOD PRESSURE: 62 MMHG | BODY MASS INDEX: 37.19 KG/M2

## 2025-06-03 DIAGNOSIS — Z00.00 MEDICARE ANNUAL WELLNESS VISIT, SUBSEQUENT: Primary | ICD-10-CM

## 2025-06-03 DIAGNOSIS — E11.9 TYPE 2 DIABETES MELLITUS WITHOUT COMPLICATION, WITHOUT LONG-TERM CURRENT USE OF INSULIN (HCC): ICD-10-CM

## 2025-06-03 DIAGNOSIS — G62.9 NEUROPATHY: ICD-10-CM

## 2025-06-03 LAB — HBA1C MFR BLD: 5.3 %

## 2025-06-03 PROCEDURE — 3044F HG A1C LEVEL LT 7.0%: CPT | Performed by: NURSE PRACTITIONER

## 2025-06-03 PROCEDURE — 1123F ACP DISCUSS/DSCN MKR DOCD: CPT | Performed by: NURSE PRACTITIONER

## 2025-06-03 PROCEDURE — 99212 OFFICE O/P EST SF 10 MIN: CPT | Performed by: NURSE PRACTITIONER

## 2025-06-03 PROCEDURE — 1159F MED LIST DOCD IN RCRD: CPT | Performed by: NURSE PRACTITIONER

## 2025-06-03 PROCEDURE — 3078F DIAST BP <80 MM HG: CPT | Performed by: NURSE PRACTITIONER

## 2025-06-03 PROCEDURE — G0439 PPPS, SUBSEQ VISIT: HCPCS | Performed by: NURSE PRACTITIONER

## 2025-06-03 PROCEDURE — 1160F RVW MEDS BY RX/DR IN RCRD: CPT | Performed by: NURSE PRACTITIONER

## 2025-06-03 PROCEDURE — 3074F SYST BP LT 130 MM HG: CPT | Performed by: NURSE PRACTITIONER

## 2025-06-03 PROCEDURE — 83036 HEMOGLOBIN GLYCOSYLATED A1C: CPT | Performed by: NURSE PRACTITIONER

## 2025-06-03 ASSESSMENT — VISUAL ACUITY
OD_CC: 20/30
OS_CC: 20/30

## 2025-06-03 ASSESSMENT — PATIENT HEALTH QUESTIONNAIRE - PHQ9
1. LITTLE INTEREST OR PLEASURE IN DOING THINGS: NOT AT ALL
SUM OF ALL RESPONSES TO PHQ QUESTIONS 1-9: 0
2. FEELING DOWN, DEPRESSED OR HOPELESS: NOT AT ALL

## 2025-06-03 ASSESSMENT — LIFESTYLE VARIABLES
HOW MANY STANDARD DRINKS CONTAINING ALCOHOL DO YOU HAVE ON A TYPICAL DAY: PATIENT DOES NOT DRINK
HOW OFTEN DO YOU HAVE A DRINK CONTAINING ALCOHOL: NEVER

## 2025-06-03 NOTE — PATIENT INSTRUCTIONS
doctor if:    You think your hearing is getting worse.     You have new symptoms, such as dizziness or nausea.   Where can you learn more?  Go to https://www.[employer].net/patientEd and enter R798 to learn more about \"Hearing Loss: Care Instructions.\"  Current as of: October 27, 2024  Content Version: 14.4  © 5044-5135 [employer].   Care instructions adapted under license by [employer]. If you have questions about a medical condition or this instruction, always ask your healthcare professional. [employer] disclaims any warranty or liability for your use of this information.         Starting a Weight-Loss Plan: Care Instructions  Overview    It can be a challenge to lose weight. But your doctor can help you make a weight-loss plan that meets your needs.  You don't have to make a lot of big changes at once. A better idea might be to focus on small changes and stick with them. When those changes become habit, you can add a few more changes.  Some people find it helpful to take an exercise or nutrition class. If you have questions, ask your doctor about seeing a registered dietitian or an exercise specialist. You might also think about joining a weight-loss support group.  If you're not ready to make changes right now, try to pick a date in the future. Then make an appointment with your doctor to talk about when and how you'll get started with a plan.  Follow-up care is a key part of your treatment and safety. Be sure to make and go to all appointments, and call your doctor if you are having problems. It's also a good idea to know your test results and keep a list of the medicines you take.  How can you care for yourself as you start a weight-loss plan?  Set realistic goals. Many people expect to lose much more weight than is likely. A weight loss of 5% to 10% of your body weight may be enough to improve your health.  Get family and friends involved to provide support. Talk to them about why

## 2025-06-03 NOTE — PROGRESS NOTES
Medicare Annual Wellness Visit    John Gunderson is here for Medicare AWV and Diabetes    Assessment & Plan   Medicare annual wellness visit, subsequent  Overall he is doing/feeling good  Patient advised to follow a low refined carb, low sugar diet with healthy fats such as avocados, variety of nuts,extra virgin olive oil and omega 3's.  Recommend 150 minutes of cardiovascular exercise per week as able and tolerated.   Cologuard due 2026  Declines vaccines due  Continues with cardio, urology, dermatology, ID closely  Yearly labs due at f/u appt    Type 2 diabetes mellitus without complication, without long-term current use of insulin (HCC)  Results for orders placed or performed in visit on 06/03/25   POCT glycosylated hemoglobin (Hb A1C)   Result Value Ref Range    Hemoglobin A1C 5.3 %     Well controlled on amaryl 2 mg daily, metformin 1000 mg daily and mounjaro 2.5 mg weekly without hypoglycemia  Monitor feet daily  Will request diabetic eye exam  Diabetic diet encouraged, he declines to stop any meds at this time    -     POCT glycosylated hemoglobin (Hb A1C)  Neuropathy  Suspect this is coming from his lumbar spine with known xray findings, he wants to work on HEP for now and will start otc nerve supplement such as neurvive or alpha lipoic acid/b complex  Call INB or worsening     Details    Reading Physician Reading Date Result Priority   Bahvik Adamson MD  [phone]     12/11/2018      Narrative & Impression  EXAMINATION:  3 XRAY VIEWS OF THE LUMBAR SPINE     12/11/2018 10:23 am     COMPARISON:  None.     HISTORY:  ORDERING SYSTEM PROVIDED HISTORY: Lumbar pain with radiation down left leg  TECHNOLOGIST PROVIDED HISTORY:  pain     FINDINGS:  Frontal, lateral, and lumbosacral cone-down views of the lumbar spine are  submitted for review.  Advanced multilevel degenerative disease and facet  arthropathy throughout the lumbar spine.  No convincing evidence for acute  fracture or malalignment.  Dense

## 2025-06-03 NOTE — PROGRESS NOTES
Visit Information    Have you changed or started any medications since your last visit including any over-the-counter medicines, vitamins, or herbal medicines? no   Have you stopped taking any of your medications? Is so, why? -  no  Are you having any side effects from any of your medications? - no    Have you seen any other physician or provider since your last visit?  no   Have you had any other diagnostic tests since your last visit?  no   Have you been seen in the emergency room and/or had an admission in a hospital since we last saw you?  no   Have you had your routine dental cleaning in the past 6 months?  no     Do you have an active MyChart account? If no, what is the barrier?  Yes    Patient Care Team:  Vira Means APRN - CNP as PCP - General (Family Nurse Practitioner)  Vira Means APRN - CNP as PCP - Empaneled Provider    Medical History Review  Past Medical, Family, and Social History reviewed and  contribute to the patient presenting condition    Health Maintenance   Topic Date Due    DTaP/Tdap/Td vaccine (1 - Tdap) Never done    Shingles vaccine (2 of 3) 12/15/2016    COVID-19 Vaccine (1 - 2024-25 season) Never done    Diabetic retinal exam  11/02/2024    Respiratory Syncytial Virus (RSV) Pregnant or age 60 yrs+ (1 - 1-dose 75+ series) Never done    Flu vaccine (Season Ended) 11/06/2025 (Originally 8/1/2025)    Annual Wellness Visit (Medicare)  06/05/2025    Lipids  08/02/2025    Diabetic foot exam  08/21/2025    GFR test (Diabetes, CKD 3-4, OR last GFR 15-59)  09/03/2025    Diabetic Alb to Cr ratio (uACR) test  10/19/2025    A1C test (Diabetic or Prediabetic)  01/29/2026    Depression Screen  01/29/2026    Colorectal Cancer Screen  11/21/2026    Pneumococcal 50+ years Vaccine  Completed    AAA screen  Completed    Hepatitis C screen  Completed    Hepatitis A vaccine  Aged Out    Hepatitis B vaccine  Aged Out    Hib vaccine  Aged Out    Polio vaccine  Aged Out    Meningococcal (ACWY)

## 2025-06-16 ENCOUNTER — TELEPHONE (OUTPATIENT)
Dept: FAMILY MEDICINE CLINIC | Age: 75
End: 2025-06-16

## 2025-06-16 DIAGNOSIS — G57.93 NEUROPATHIC PAIN, LEG, BILATERAL: ICD-10-CM

## 2025-06-16 DIAGNOSIS — E78.2 MIXED HYPERLIPIDEMIA: ICD-10-CM

## 2025-06-16 DIAGNOSIS — I10 ESSENTIAL HYPERTENSION: Primary | ICD-10-CM

## 2025-06-16 NOTE — TELEPHONE ENCOUNTER
Pts wife contacted office requesting 2 referrals       Dr.Anthony Cristina  [facility]  PN: [phone]  DX: I70.0, I70.8         [facility]   PN: [phone]  DX: G57.93

## 2025-06-17 NOTE — TELEPHONE ENCOUNTER
Neuro for leg weakness- hardly no feeling in lower legs. Provider- will be given after all info has been faxed-  (fax) [phone]

## 2025-07-21 RX ORDER — GLIMEPIRIDE 2 MG/1
2 TABLET ORAL
Qty: 90 TABLET | Refills: 3 | Status: SHIPPED | OUTPATIENT
Start: 2025-07-21

## 2025-07-31 RX ORDER — HYDROCHLOROTHIAZIDE 12.5 MG/1
12.5 CAPSULE ORAL EVERY MORNING
Qty: 90 CAPSULE | Refills: 3 | Status: SHIPPED | OUTPATIENT
Start: 2025-07-31

## 2025-08-08 RX ORDER — FUROSEMIDE 20 MG/1
TABLET ORAL
Qty: 60 TABLET | OUTPATIENT
Start: 2025-08-08